# Patient Record
Sex: MALE | Race: WHITE | Employment: PART TIME | URBAN - METROPOLITAN AREA
[De-identification: names, ages, dates, MRNs, and addresses within clinical notes are randomized per-mention and may not be internally consistent; named-entity substitution may affect disease eponyms.]

---

## 2018-05-31 ENCOUNTER — HOSPITAL ENCOUNTER (OUTPATIENT)
Dept: ULTRASOUND IMAGING | Facility: HOSPITAL | Age: 63
Discharge: HOME | End: 2018-05-31
Attending: INTERNAL MEDICINE
Payer: COMMERCIAL

## 2018-05-31 DIAGNOSIS — L97.522 CHRONIC ULCER OF LEFT FOOT WITH FAT LAYER EXPOSED (HCC): ICD-10-CM

## 2018-05-31 DIAGNOSIS — E13.51 PERIPHERAL VASCULAR DISEASE DUE TO SECONDARY DIABETES (HCC): ICD-10-CM

## 2018-05-31 PROCEDURE — 93923 UPR/LXTR ART STDY 3+ LVLS: CPT | Performed by: INTERNAL MEDICINE

## 2018-11-02 ENCOUNTER — OFFICE VISIT (OUTPATIENT)
Dept: INTERNAL MEDICINE CLINIC | Facility: CLINIC | Age: 63
End: 2018-11-02
Payer: COMMERCIAL

## 2018-11-02 VITALS
WEIGHT: 217 LBS | BODY MASS INDEX: 28.76 KG/M2 | DIASTOLIC BLOOD PRESSURE: 72 MMHG | HEIGHT: 73 IN | HEART RATE: 69 BPM | OXYGEN SATURATION: 98 % | SYSTOLIC BLOOD PRESSURE: 120 MMHG | TEMPERATURE: 99 F

## 2018-11-02 DIAGNOSIS — E11.42 UNCONTROLLED TYPE 2 DIABETES MELLITUS WITH PERIPHERAL NEUROPATHY (HCC): ICD-10-CM

## 2018-11-02 DIAGNOSIS — Z00.00 WELLNESS EXAMINATION: Primary | ICD-10-CM

## 2018-11-02 DIAGNOSIS — E11.65 UNCONTROLLED TYPE 2 DIABETES MELLITUS WITH PERIPHERAL NEUROPATHY (HCC): ICD-10-CM

## 2018-11-02 PROCEDURE — 99386 PREV VISIT NEW AGE 40-64: CPT | Performed by: INTERNAL MEDICINE

## 2018-11-02 NOTE — PROGRESS NOTES
HPI:    Patient ID: Anthony Vergara is a 63year old male. Pt here for getting established as a new patient - has uncontrolled DM - most recent A1c was 11 does not take statin due to side effects. Labs were done 3 weeks .  Sees endocrinology for DM m round, and reactive to light. No scleral icterus. Neck: Neck supple. No JVD present. Cardiovascular: Normal rate, regular rhythm and normal heart sounds. Exam reveals no gallop. No murmur heard.   Pulmonary/Chest: Effort normal and breath sounds nor

## 2018-12-26 ENCOUNTER — LAB ENCOUNTER (OUTPATIENT)
Dept: LAB | Facility: HOSPITAL | Age: 63
End: 2018-12-26
Attending: INTERNAL MEDICINE
Payer: COMMERCIAL

## 2018-12-26 DIAGNOSIS — E11.39 DIABETIC OCULOPATHY (HCC): Primary | ICD-10-CM

## 2018-12-26 DIAGNOSIS — Z00.00 WELLNESS EXAMINATION: ICD-10-CM

## 2018-12-26 PROCEDURE — 36415 COLL VENOUS BLD VENIPUNCTURE: CPT

## 2018-12-26 PROCEDURE — 83036 HEMOGLOBIN GLYCOSYLATED A1C: CPT

## 2018-12-26 PROCEDURE — 80053 COMPREHEN METABOLIC PANEL: CPT

## 2019-01-02 ENCOUNTER — OFFICE VISIT (OUTPATIENT)
Dept: INTERNAL MEDICINE CLINIC | Facility: CLINIC | Age: 64
End: 2019-01-02
Payer: COMMERCIAL

## 2019-01-02 VITALS
HEART RATE: 64 BPM | SYSTOLIC BLOOD PRESSURE: 120 MMHG | WEIGHT: 220 LBS | BODY MASS INDEX: 29.16 KG/M2 | OXYGEN SATURATION: 98 % | DIASTOLIC BLOOD PRESSURE: 70 MMHG | HEIGHT: 73 IN

## 2019-01-02 DIAGNOSIS — E11.65 UNCONTROLLED TYPE 2 DIABETES MELLITUS WITH NEUROPATHY CAUSING ERECTILE DYSFUNCTION (HCC): Primary | ICD-10-CM

## 2019-01-02 DIAGNOSIS — Z12.11 COLON CANCER SCREENING: ICD-10-CM

## 2019-01-02 DIAGNOSIS — N52.1 UNCONTROLLED TYPE 2 DIABETES MELLITUS WITH NEUROPATHY CAUSING ERECTILE DYSFUNCTION (HCC): Primary | ICD-10-CM

## 2019-01-02 DIAGNOSIS — E11.40 UNCONTROLLED TYPE 2 DIABETES MELLITUS WITH NEUROPATHY CAUSING ERECTILE DYSFUNCTION (HCC): Primary | ICD-10-CM

## 2019-01-02 PROCEDURE — 99214 OFFICE O/P EST MOD 30 MIN: CPT | Performed by: INTERNAL MEDICINE

## 2019-01-02 NOTE — PROGRESS NOTES
HPI:    Patient ID: Ryan Camara is a 66year old male. Pt here for fu on recent labs for diabetes - had  A1c of 10.7 and fasting sugar of 376 currently - will increase his Levemir from 36 units to 50 units  And 10-14 humalog at meals.   Admits to exhibits no distension. There is no hepatosplenomegaly. Musculoskeletal: He exhibits no tenderness. Neurological: He is alert and oriented to person, place, and time.    mirofilamnet sensation slightly impaired in both feet normal pedal pulses normal na

## 2019-01-07 ENCOUNTER — OFFICE VISIT (OUTPATIENT)
Dept: OPHTHALMOLOGY | Facility: CLINIC | Age: 64
End: 2019-01-07
Payer: COMMERCIAL

## 2019-01-07 DIAGNOSIS — H01.00B BLEPHARITIS OF UPPER AND LOWER EYELIDS OF BOTH EYES, UNSPECIFIED TYPE: ICD-10-CM

## 2019-01-07 DIAGNOSIS — H52.13 MYOPIA OF BOTH EYES WITH ASTIGMATISM AND PRESBYOPIA: ICD-10-CM

## 2019-01-07 DIAGNOSIS — H01.00A BLEPHARITIS OF UPPER AND LOWER EYELIDS OF BOTH EYES, UNSPECIFIED TYPE: ICD-10-CM

## 2019-01-07 DIAGNOSIS — H25.13 AGE-RELATED NUCLEAR CATARACT OF BOTH EYES: ICD-10-CM

## 2019-01-07 DIAGNOSIS — E11.3299 BDR (BACKGROUND DIABETIC RETINOPATHY) (HCC): ICD-10-CM

## 2019-01-07 DIAGNOSIS — H52.4 MYOPIA OF BOTH EYES WITH ASTIGMATISM AND PRESBYOPIA: ICD-10-CM

## 2019-01-07 DIAGNOSIS — H52.203 MYOPIA OF BOTH EYES WITH ASTIGMATISM AND PRESBYOPIA: ICD-10-CM

## 2019-01-07 DIAGNOSIS — H50.05 ALTERNATING ESOTROPIA: Primary | ICD-10-CM

## 2019-01-07 DIAGNOSIS — H40.003 GLAUCOMA SUSPECT OF BOTH EYES: ICD-10-CM

## 2019-01-07 PROCEDURE — 92015 DETERMINE REFRACTIVE STATE: CPT | Performed by: OPHTHALMOLOGY

## 2019-01-07 PROCEDURE — 92004 COMPRE OPH EXAM NEW PT 1/>: CPT | Performed by: OPHTHALMOLOGY

## 2019-01-07 NOTE — ASSESSMENT & PLAN NOTE
Continue to follow up with Dr. Endy Bennett as directed. Patient declines dilated exam today due to he has to drive his son to school today. Advised keeping blood sugar under control.

## 2019-01-07 NOTE — PROGRESS NOTES
Nataly Ross is a 66year old male. HPI:     HPI     EP/ 66 yr old here for a complete exam. LDE was in 2009 with Dr. Samira Bocanegra.  Pt states that he has been seeing Dr. Kit Beraux since 2009 and recently has been seeing him every 2 months for injections O Disp:  Rfl:    Accu-Chek Soft Touch Lancets Does not apply Misc  Disp:  Rfl:    LEVEMIR FLEXPEN 100 UNIT/ML Subcutaneous Solution Pen-injector  Disp:  Rfl:    Glucose Blood (ACCU-CHEK ADVANTAGE TEST) In Vitro Strip  Disp:  Rfl:    Insulin Pen Needle (EXEL Sphere Cylinder [place] Dist [place] Add [place]    Right -6.50 +1.00 075 20/30 +2.75 20/20    Left -6.00 +1.00 075 20/30 +2.75 20/20          Manifest Refraction #2       Sphere Cylinder [place] Dist [place] Add [place]    Right -6.50 +1.50 065 20/30- +2.50 20/30-

## 2019-01-07 NOTE — ASSESSMENT & PLAN NOTE
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    Will have patient back in 1 month for diabet

## 2019-01-07 NOTE — PATIENT INSTRUCTIONS
Blepharitis of upper and lower eyelids of both eyes  Patient instructed to use lid hygiene twice daily. Apply baby shampoo to warm washcloth and scrub eyelids gently with eyes closed, then rinse thoroughly.         Alternating esotropia  Longstanding; no t

## 2019-02-04 ENCOUNTER — OFFICE VISIT (OUTPATIENT)
Dept: OPHTHALMOLOGY | Facility: CLINIC | Age: 64
End: 2019-02-04
Payer: COMMERCIAL

## 2019-02-04 DIAGNOSIS — H52.203 MYOPIA OF BOTH EYES WITH ASTIGMATISM AND PRESBYOPIA: ICD-10-CM

## 2019-02-04 DIAGNOSIS — H01.00B BLEPHARITIS OF UPPER AND LOWER EYELIDS OF BOTH EYES, UNSPECIFIED TYPE: ICD-10-CM

## 2019-02-04 DIAGNOSIS — E11.3299 BDR (BACKGROUND DIABETIC RETINOPATHY) (HCC): ICD-10-CM

## 2019-02-04 DIAGNOSIS — H52.13 MYOPIA OF BOTH EYES WITH ASTIGMATISM AND PRESBYOPIA: ICD-10-CM

## 2019-02-04 DIAGNOSIS — H01.00A BLEPHARITIS OF UPPER AND LOWER EYELIDS OF BOTH EYES, UNSPECIFIED TYPE: ICD-10-CM

## 2019-02-04 DIAGNOSIS — H40.003 GLAUCOMA SUSPECT OF BOTH EYES: ICD-10-CM

## 2019-02-04 DIAGNOSIS — H40.1121 PRIMARY OPEN ANGLE GLAUCOMA (POAG) OF LEFT EYE, MILD STAGE: Primary | ICD-10-CM

## 2019-02-04 DIAGNOSIS — H50.05 ALTERNATING ESOTROPIA: ICD-10-CM

## 2019-02-04 DIAGNOSIS — H25.13 AGE-RELATED NUCLEAR CATARACT OF BOTH EYES: ICD-10-CM

## 2019-02-04 DIAGNOSIS — H52.4 MYOPIA OF BOTH EYES WITH ASTIGMATISM AND PRESBYOPIA: ICD-10-CM

## 2019-02-04 PROCEDURE — 92014 COMPRE OPH EXAM EST PT 1/>: CPT | Performed by: OPHTHALMOLOGY

## 2019-02-04 PROCEDURE — 92133 CPTRZD OPH DX IMG PST SGM ON: CPT | Performed by: OPHTHALMOLOGY

## 2019-02-04 PROCEDURE — 76514 ECHO EXAM OF EYE THICKNESS: CPT | Performed by: OPHTHALMOLOGY

## 2019-02-04 RX ORDER — LATANOPROST 50 UG/ML
1 SOLUTION/ DROPS OPHTHALMIC NIGHTLY
Qty: 3 BOTTLE | Refills: 3 | Status: SHIPPED | OUTPATIENT
Start: 2019-02-04 | End: 2020-02-07

## 2019-02-04 NOTE — ASSESSMENT & PLAN NOTE
Discussed diagnosis of glaucoma in the left eye with patient. START Latanoprost in the left eye only at bedtime. Will have patient back in 2 months for a visual field and repeat IOP.     Discussed potential side effects of Latanoprost drops such as colo

## 2019-02-04 NOTE — PATIENT INSTRUCTIONS
Primary open angle glaucoma (POAG) of left eye, mild stage  Discussed diagnosis of glaucoma in the left eye with patient. START Latanoprost in the left eye only at bedtime. Will have patient back in 2 months for a visual field and repeat IOP.     Discus They should continue to be monitored every trimester and for 1 year after delivery, depending on the severity of the retinopathy.   Your medical history  Your eye healthcare provider may ask about the following:  · Your diabetes type, history, treatments (s photographs. You may feel brief nausea during the procedure. For a few hours after the test, your skin, eyes, and urine may appear yellow.  Talk with your healthcare provider for more information about this test.   Date Last Reviewed: 6/1/2016 © 2000-2018 (at least every 1 to 2 years).     To learn more  The resources below can help you learn more:  · American Diabetes Association   [phone]  www. diabetes. org  · Chip Mcdonough  164.588.8636  www. lighthouse. org  · [address]  [address]- and how often you need these exams. You can also help control your diabetes through exercise, diet, and medicine, as instructed by your healthcare provider. These same steps may also help control diabetic retinopathy.   Date Last Reviewed: 6/1/2016 © 2000- retina (the inside lining of the back of the eye). · Grid treatment treats swelling in different areas of the macula (a spot in the middle of the retina responsible for clear vision). · Focal treatment seals up small areas of leakage in the retina.   Prep include the following:   · Eye irritation  · Bleeding in the retina  · Retinal detachment  · Corneal abrasion  · Watery eyes  · Dilated pupils  · Mild headache  · Double or blurry vision  · Decreased vision  · Seeing spots  · Problems with glare  · Loss of suddenly becomes completely blocked. Eye pressure builds rapidly. You may notice blurred vision and rainbow halos around lights. You may also have headaches, nausea, vomiting, and severe pain. If not treated right away, blindness can happen quickly.   Date

## 2019-02-05 NOTE — PROGRESS NOTES
Radha Han is a 66year old male. HPI:     HPI     EP/ 67 yo M here for DM EE, OCT and pachymetry. He was never dilated here before. Patient has Type 2 DM for 12 years; Last A1C was 10.7 in December of 2018.   Patient got new glasses made from [facility]      Medications:    Current Outpatient Medications:  latanoprost 0.005 % Ophthalmic Solution Place 1 drop into the left eye nightly.  Instill 1 drop by ophthalmic route every night into left eye Disp: 3 Bottle Rfl: 3   Neomycin-Polymyxin-HC (CORTISPORIN Cylinder Axis    Right -7.00 +2.00 062    Left -6.00 +0.00 000    Type:  Distance only          Wearing Rx #2       Sphere Cylinder Axis    Right -5.50 +1.00 083    Left -6.00 +1.25 079    Type:  Reading only                 ASSESSMENT/PLAN:     Diagnoses instructions:  Return in about 2 months (around 4/4/2019) for Visual field, IOP check. 2/4/2019  Scribed by: Jo Ann Lyman.  Paty Robison MD

## 2019-04-01 ENCOUNTER — OFFICE VISIT (OUTPATIENT)
Dept: OPHTHALMOLOGY | Facility: CLINIC | Age: 64
End: 2019-04-01
Payer: COMMERCIAL

## 2019-04-01 DIAGNOSIS — H52.13 MYOPIA OF BOTH EYES WITH ASTIGMATISM AND PRESBYOPIA: ICD-10-CM

## 2019-04-01 DIAGNOSIS — E11.3299 BDR (BACKGROUND DIABETIC RETINOPATHY) (HCC): ICD-10-CM

## 2019-04-01 DIAGNOSIS — H01.00A BLEPHARITIS OF UPPER AND LOWER EYELIDS OF BOTH EYES, UNSPECIFIED TYPE: ICD-10-CM

## 2019-04-01 DIAGNOSIS — H52.203 MYOPIA OF BOTH EYES WITH ASTIGMATISM AND PRESBYOPIA: ICD-10-CM

## 2019-04-01 DIAGNOSIS — H40.1121 PRIMARY OPEN ANGLE GLAUCOMA (POAG) OF LEFT EYE, MILD STAGE: Primary | ICD-10-CM

## 2019-04-01 DIAGNOSIS — H52.4 MYOPIA OF BOTH EYES WITH ASTIGMATISM AND PRESBYOPIA: ICD-10-CM

## 2019-04-01 DIAGNOSIS — H01.00B BLEPHARITIS OF UPPER AND LOWER EYELIDS OF BOTH EYES, UNSPECIFIED TYPE: ICD-10-CM

## 2019-04-01 DIAGNOSIS — H50.05 ALTERNATING ESOTROPIA: ICD-10-CM

## 2019-04-01 DIAGNOSIS — H25.13 AGE-RELATED NUCLEAR CATARACT OF BOTH EYES: ICD-10-CM

## 2019-04-01 PROBLEM — E11.311 DIABETIC MACULAR EDEMA OF LEFT EYE: Status: ACTIVE | Noted: 2018-01-01

## 2019-04-01 PROBLEM — E11.311 DIABETIC MACULAR EDEMA OF LEFT EYE (HCC): Status: ACTIVE | Noted: 2018-01-01

## 2019-04-01 PROBLEM — H35.81 DIABETIC MACULAR EDEMA OF LEFT EYE: Status: ACTIVE | Noted: 2018-01-01

## 2019-04-01 PROBLEM — H35.81: Status: ACTIVE | Noted: 2018-01-01

## 2019-04-01 PROBLEM — H35.81 DIABETIC MACULAR EDEMA OF LEFT EYE (HCC): Status: ACTIVE | Noted: 2018-01-01

## 2019-04-01 PROBLEM — E11.311: Status: ACTIVE | Noted: 2018-01-01

## 2019-04-01 PROCEDURE — 92012 INTRM OPH EXAM EST PATIENT: CPT | Performed by: OPHTHALMOLOGY

## 2019-04-01 PROCEDURE — 92083 EXTENDED VISUAL FIELD XM: CPT | Performed by: OPHTHALMOLOGY

## 2019-04-01 NOTE — PATIENT INSTRUCTIONS
Blepharitis of upper and lower eyelids of both eyes  Patient instructed to use lid hygiene twice daily. Apply baby shampoo to warm washcloth and scrub eyelids gently with eyes closed, then rinse thoroughly.         Primary open angle glaucoma (POAG) of lef

## 2019-04-01 NOTE — PROGRESS NOTES
Natasha Garcia is a 66year old male. HPI:     HPI     EP/ 66year old here for a VF and IOP check. LDE was 2/04/19 with a hx of POAG OS Dr. Rafaela King  started Latanoprost OS QHS on 2/04/19.   He also has a hx of Glaucoma suspect OD, myopia with astigm latanoprost 0.005 % Ophthalmic Solution Place 1 drop into the left eye nightly.  Instill 1 drop by ophthalmic route every night into left eye Disp: 3 Bottle Rfl: 3   Insulin Lispro, Human, (HUMALOG) 100 UNIT/ML Subcutaneous Solution Inject  into the skin 3 Vessels few dot heme, no vladislav few dot heme, no vladislav    Periphery Normal Normal            Refraction     Wearing Rx       Sphere Cylinder Axis    Right -7.00 +2.00 062    Left -6.00 +0.00 000    Type:  Distance only          Wearing Rx #2       Sphere Cylin

## 2019-04-01 NOTE — ASSESSMENT & PLAN NOTE
Follow with Dr. Trent Albert. Last seen 3/18/19, no injection that visit for macular edema. Diabetic macular edema OU resolved following treatment in both eyes which involved focal laser and Ey injections.

## 2019-04-01 NOTE — ASSESSMENT & PLAN NOTE
IOP good 13/13 adjusted 11/9. Continue Latanoprost in  Left eye at night. No glaucoma in right eye. Visual field shows changes from focal laser- constricted field. Recheck OCT next time.

## 2019-04-03 ENCOUNTER — OFFICE VISIT (OUTPATIENT)
Dept: INTERNAL MEDICINE CLINIC | Facility: CLINIC | Age: 64
End: 2019-04-03
Payer: COMMERCIAL

## 2019-04-03 VITALS
SYSTOLIC BLOOD PRESSURE: 144 MMHG | HEART RATE: 67 BPM | HEIGHT: 73 IN | WEIGHT: 221 LBS | DIASTOLIC BLOOD PRESSURE: 72 MMHG | OXYGEN SATURATION: 99 % | BODY MASS INDEX: 29.29 KG/M2

## 2019-04-03 DIAGNOSIS — Z12.11 COLON CANCER SCREENING: ICD-10-CM

## 2019-04-03 DIAGNOSIS — E78.2 MIXED HYPERLIPIDEMIA: ICD-10-CM

## 2019-04-03 DIAGNOSIS — E11.65 TYPE 2 DIABETES MELLITUS, UNCONTROLLED, WITH NEUROPATHY (HCC): Primary | ICD-10-CM

## 2019-04-03 DIAGNOSIS — E11.40 TYPE 2 DIABETES MELLITUS, UNCONTROLLED, WITH NEUROPATHY (HCC): Primary | ICD-10-CM

## 2019-04-03 PROCEDURE — 83036 HEMOGLOBIN GLYCOSYLATED A1C: CPT | Performed by: INTERNAL MEDICINE

## 2019-04-03 PROCEDURE — 99214 OFFICE O/P EST MOD 30 MIN: CPT | Performed by: INTERNAL MEDICINE

## 2019-04-03 PROCEDURE — 80053 COMPREHEN METABOLIC PANEL: CPT | Performed by: INTERNAL MEDICINE

## 2019-04-03 PROCEDURE — 85025 COMPLETE CBC W/AUTO DIFF WBC: CPT | Performed by: INTERNAL MEDICINE

## 2019-04-03 PROCEDURE — 80061 LIPID PANEL: CPT | Performed by: INTERNAL MEDICINE

## 2019-04-03 NOTE — PROGRESS NOTES
Pt presented to clinic today for blood draw. Per physician able to draw orders. Orders  documented within chart. Pt tolerated lab draw well.  verified.   Orders drawn include: A1c, CBC, CMP, Lipid  Site of draw: left arm

## 2019-04-03 NOTE — PROGRESS NOTES
HPI:    Patient ID: Florin Solis is a 66year old male. Pt here for fu on Dm hyperlipidemia and diabetic retininopathy. Has had better control of glycemia recently. Has been a bit constipated. Is overdue for colon cancer screening.   Average ris Soft. He exhibits no distension. There is no tenderness. Musculoskeletal: He exhibits no tenderness. Neurological: He is alert and oriented to person, place, and time.    Diminished microfilament sensation in the soles of both feet with some diminished

## 2019-07-01 ENCOUNTER — OFFICE VISIT (OUTPATIENT)
Dept: OPHTHALMOLOGY | Facility: CLINIC | Age: 64
End: 2019-07-01
Payer: COMMERCIAL

## 2019-07-01 DIAGNOSIS — H52.203 MYOPIA OF BOTH EYES WITH ASTIGMATISM AND PRESBYOPIA: ICD-10-CM

## 2019-07-01 DIAGNOSIS — H35.81: ICD-10-CM

## 2019-07-01 DIAGNOSIS — E11.311: ICD-10-CM

## 2019-07-01 DIAGNOSIS — H52.4 MYOPIA OF BOTH EYES WITH ASTIGMATISM AND PRESBYOPIA: ICD-10-CM

## 2019-07-01 DIAGNOSIS — H52.13 MYOPIA OF BOTH EYES WITH ASTIGMATISM AND PRESBYOPIA: ICD-10-CM

## 2019-07-01 DIAGNOSIS — H40.1121 PRIMARY OPEN ANGLE GLAUCOMA (POAG) OF LEFT EYE, MILD STAGE: Primary | ICD-10-CM

## 2019-07-01 DIAGNOSIS — H01.00A BLEPHARITIS OF UPPER AND LOWER EYELIDS OF BOTH EYES, UNSPECIFIED TYPE: ICD-10-CM

## 2019-07-01 DIAGNOSIS — H01.00B BLEPHARITIS OF UPPER AND LOWER EYELIDS OF BOTH EYES, UNSPECIFIED TYPE: ICD-10-CM

## 2019-07-01 PROCEDURE — 92133 CPTRZD OPH DX IMG PST SGM ON: CPT | Performed by: OPHTHALMOLOGY

## 2019-07-01 PROCEDURE — 92012 INTRM OPH EXAM EST PATIENT: CPT | Performed by: OPHTHALMOLOGY

## 2019-07-01 NOTE — PROGRESS NOTES
Catherine Cisneros is a 66year old male. HPI:     HPI     EP/ 67 yo M here for OCT and IOP. LDE: 2/4/19   Last OCT: 2/4/19  Last VF: 4/1/19   Patient has COAG OS and is on Latanoprost qhs OS.   He has BDR OU, cataract OU, alternating ET, and blephariti No      Medications:    Current Outpatient Medications:  latanoprost 0.005 % Ophthalmic Solution Place 1 drop into the left eye nightly.  Instill 1 drop by ophthalmic route every night into left eye Disp: 3 Bottle Rfl: 3   Insulin Lispro, Human, (HUMALOG) 1 check.    7/1/2019  Scribed by: Jose Berry MD

## 2019-07-01 NOTE — PATIENT INSTRUCTIONS
Primary open angle glaucoma (POAG) of left eye, mild stage  IOP 15/15 adjusted 13/11  Continue Latanoprost at night left eye    Myopia of both eyes with astigmatism and presbyopia  Same glasses    Blepharitis of upper and lower eyelids of both eyes  Patien

## 2019-11-01 ENCOUNTER — OFFICE VISIT (OUTPATIENT)
Dept: OPHTHALMOLOGY | Facility: CLINIC | Age: 64
End: 2019-11-01
Payer: COMMERCIAL

## 2019-11-01 DIAGNOSIS — H40.1121 PRIMARY OPEN ANGLE GLAUCOMA (POAG) OF LEFT EYE, MILD STAGE: Primary | ICD-10-CM

## 2019-11-01 DIAGNOSIS — H52.13 MYOPIA OF BOTH EYES WITH ASTIGMATISM AND PRESBYOPIA: ICD-10-CM

## 2019-11-01 DIAGNOSIS — H50.05 ALTERNATING ESOTROPIA: ICD-10-CM

## 2019-11-01 DIAGNOSIS — H52.203 MYOPIA OF BOTH EYES WITH ASTIGMATISM AND PRESBYOPIA: ICD-10-CM

## 2019-11-01 DIAGNOSIS — E11.3299 BDR (BACKGROUND DIABETIC RETINOPATHY) (HCC): ICD-10-CM

## 2019-11-01 DIAGNOSIS — H52.4 MYOPIA OF BOTH EYES WITH ASTIGMATISM AND PRESBYOPIA: ICD-10-CM

## 2019-11-01 DIAGNOSIS — H01.00A BLEPHARITIS OF UPPER AND LOWER EYELIDS OF BOTH EYES, UNSPECIFIED TYPE: ICD-10-CM

## 2019-11-01 DIAGNOSIS — H25.13 AGE-RELATED NUCLEAR CATARACT OF BOTH EYES: ICD-10-CM

## 2019-11-01 DIAGNOSIS — H01.00B BLEPHARITIS OF UPPER AND LOWER EYELIDS OF BOTH EYES, UNSPECIFIED TYPE: ICD-10-CM

## 2019-11-01 PROCEDURE — 92015 DETERMINE REFRACTIVE STATE: CPT | Performed by: OPHTHALMOLOGY

## 2019-11-01 PROCEDURE — 92012 INTRM OPH EXAM EST PATIENT: CPT | Performed by: OPHTHALMOLOGY

## 2019-11-01 NOTE — PROGRESS NOTES
Alcon Ortega is a 66year old male. HPI:     HPI     EP/66year old here for an IOP check.   LDE was 2/04/19 he was last seen on 7/01/19 with a hx of COAG OS and is on Latanoprost qhs OS.  He has BDR OU, cataract OU, alternating ET, and blepharitis Place 1 drop into the left eye nightly. Instill 1 drop by ophthalmic route every night into left eye, Disp: 3 Bottle, Rfl: 3  Insulin Lispro, Human, (HUMALOG) 100 UNIT/ML Subcutaneous Solution, Inject  into the skin 3 (three) times daily before meals. , Dis Left -6.00 +0.00 000    Type:  Distance only          Manifest Refraction       Sphere Cylinder [place] Dist [place] Add [place]    Right -6.75 +1.50 060 20/30- +2.50 20/20-    Left -5.75 Sphere  20/30- +2.50 20/20-          Final Rx       Sphere Cylinder Axis Dis

## 2019-11-06 ENCOUNTER — OFFICE VISIT (OUTPATIENT)
Dept: INTERNAL MEDICINE CLINIC | Facility: CLINIC | Age: 64
End: 2019-11-06
Payer: COMMERCIAL

## 2019-11-06 VITALS
BODY MASS INDEX: 29.29 KG/M2 | OXYGEN SATURATION: 98 % | WEIGHT: 221 LBS | HEART RATE: 74 BPM | SYSTOLIC BLOOD PRESSURE: 124 MMHG | HEIGHT: 73 IN | DIASTOLIC BLOOD PRESSURE: 82 MMHG

## 2019-11-06 DIAGNOSIS — Z79.4 TYPE 2 DIABETES MELLITUS TREATED WITH INSULIN (HCC): ICD-10-CM

## 2019-11-06 DIAGNOSIS — E11.9 TYPE 2 DIABETES MELLITUS TREATED WITH INSULIN (HCC): ICD-10-CM

## 2019-11-06 DIAGNOSIS — E78.2 MIXED HYPERLIPIDEMIA: ICD-10-CM

## 2019-11-06 DIAGNOSIS — Z00.00 WELLNESS EXAMINATION: Primary | ICD-10-CM

## 2019-11-06 DIAGNOSIS — Z12.11 COLON CANCER SCREENING: ICD-10-CM

## 2019-11-06 PROCEDURE — 99396 PREV VISIT EST AGE 40-64: CPT | Performed by: INTERNAL MEDICINE

## 2019-11-06 PROCEDURE — 80053 COMPREHEN METABOLIC PANEL: CPT | Performed by: INTERNAL MEDICINE

## 2019-11-06 PROCEDURE — 80061 LIPID PANEL: CPT | Performed by: INTERNAL MEDICINE

## 2019-11-06 PROCEDURE — 84153 ASSAY OF PSA TOTAL: CPT | Performed by: INTERNAL MEDICINE

## 2019-11-06 PROCEDURE — 83036 HEMOGLOBIN GLYCOSYLATED A1C: CPT | Performed by: INTERNAL MEDICINE

## 2019-11-06 RX ORDER — INSULIN DEGLUDEC 200 U/ML
INJECTION, SOLUTION SUBCUTANEOUS
COMMUNITY
Start: 2019-07-11

## 2019-11-06 RX ORDER — INSULIN ASPART 100 [IU]/ML
INJECTION, SOLUTION INTRAVENOUS; SUBCUTANEOUS
COMMUNITY
Start: 2019-09-09

## 2019-11-06 RX ORDER — NAFTIFINE HCL 1 %
GEL (GRAM) TOPICAL
Refills: 1 | COMMUNITY
Start: 2019-09-16

## 2019-11-06 NOTE — PROGRESS NOTES
HPI:    Patient ID: Peter Rankin is a 66year old male. HPI Pt here for a general check up and fu on Dm type 2 and hyperlipidemia and slight glaucoma in the left eye. Is complaining of post nasal drip. Has had some constipation    Review of System trophic skin or nail changes pedal pulses intact   Skin: No rash noted. Psychiatric: He has a normal mood and affect.               ASSESSMENT/PLAN:   Wellness examination  (primary encounter diagnosis)  Type 2 diabetes mellitus treated with insulin (hcc)

## 2020-02-07 ENCOUNTER — OFFICE VISIT (OUTPATIENT)
Dept: OPHTHALMOLOGY | Facility: CLINIC | Age: 65
End: 2020-02-07
Payer: COMMERCIAL

## 2020-02-07 DIAGNOSIS — H50.05 ALTERNATING ESOTROPIA: ICD-10-CM

## 2020-02-07 DIAGNOSIS — H25.13 AGE-RELATED NUCLEAR CATARACT OF BOTH EYES: ICD-10-CM

## 2020-02-07 DIAGNOSIS — H52.13 MYOPIA OF BOTH EYES WITH ASTIGMATISM AND PRESBYOPIA: ICD-10-CM

## 2020-02-07 DIAGNOSIS — E11.311 DIABETIC MACULAR EDEMA OF BOTH EYES (HCC): ICD-10-CM

## 2020-02-07 DIAGNOSIS — E11.3299 BDR (BACKGROUND DIABETIC RETINOPATHY) (HCC): ICD-10-CM

## 2020-02-07 DIAGNOSIS — H40.1130 PRIMARY OPEN ANGLE GLAUCOMA OF BOTH EYES, UNSPECIFIED GLAUCOMA STAGE: Primary | ICD-10-CM

## 2020-02-07 DIAGNOSIS — H52.203 MYOPIA OF BOTH EYES WITH ASTIGMATISM AND PRESBYOPIA: ICD-10-CM

## 2020-02-07 DIAGNOSIS — H52.4 MYOPIA OF BOTH EYES WITH ASTIGMATISM AND PRESBYOPIA: ICD-10-CM

## 2020-02-07 PROBLEM — H40.10X0 OPEN-ANGLE GLAUCOMA OF LEFT EYE: Status: ACTIVE | Noted: 2019-02-04

## 2020-02-07 PROBLEM — H40.1131 PRIMARY OPEN ANGLE GLAUCOMA (POAG) OF BOTH EYES, MILD STAGE: Status: ACTIVE | Noted: 2019-02-04

## 2020-02-07 PROCEDURE — 92133 CPTRZD OPH DX IMG PST SGM ON: CPT | Performed by: OPHTHALMOLOGY

## 2020-02-07 PROCEDURE — 92015 DETERMINE REFRACTIVE STATE: CPT | Performed by: OPHTHALMOLOGY

## 2020-02-07 PROCEDURE — 92014 COMPRE OPH EXAM EST PT 1/>: CPT | Performed by: OPHTHALMOLOGY

## 2020-02-07 RX ORDER — LATANOPROST 50 UG/ML
1 SOLUTION/ DROPS OPHTHALMIC NIGHTLY
Qty: 3 BOTTLE | Refills: 3 | Status: SHIPPED | OUTPATIENT
Start: 2020-02-07 | End: 2020-02-12

## 2020-02-07 NOTE — PROGRESS NOTES
Fernando Hendrickson is a 60year old male. HPI:     HPI     EP/ 60 yr old here for an EE and OCT. LDE 2/4/19; last seen on 11/1/19 with Hx of BDR with macular edema; pt has been following up with Dr. Sangeetha Hinton as directed ( last seen on 12/13/19).  Pt also eyes nightly.  Instill 1 drop by ophthalmic route every night into left eye 3 Bottle 3   • NOVOLOG FLEXPEN 100 UNIT/ML Subcutaneous Solution Pen-injector      • TRESIBA FLEXTOUCH 200 UNIT/ML Subcutaneous Solution Pen-injector      • NAFTIN 1 % External Gel Sphere Cylinder [place] Dist [place] Add [place]    Right -7.25 +1.50 060 20/30- +3.00 20/20    Left -5.50 Sphere  20/30 +3.00 20/20          Final Rx       Sphere Cylinder [place] Dist VA Near [place]    Right -7.25 +1.50 060 20/30-     Left -5.50 Sphere  20/30     Type:

## 2020-02-07 NOTE — PATIENT INSTRUCTIONS
Myopia of both eyes with astigmatism and presbyopia  New glasses. Separate distance and reading.     Diabetic macular edema of both eyes ([address]  Under the care of Dr. Ariella Cunha    Primary open angle glaucoma (POAG) of both eyes  Latanoprost drops to Both eyes

## 2020-02-12 ENCOUNTER — TELEPHONE (OUTPATIENT)
Dept: OPHTHALMOLOGY | Facility: CLINIC | Age: 65
End: 2020-02-12

## 2020-02-12 RX ORDER — LATANOPROST 50 UG/ML
SOLUTION/ DROPS OPHTHALMIC
Qty: 3 BOTTLE | Refills: 3 | Status: SHIPPED | OUTPATIENT
Start: 2020-02-12 | End: 2020-10-09

## 2020-02-12 NOTE — TELEPHONE ENCOUNTER
Spoke with Zaida Zambrano and [address] [place] [address]. I told her the instructions were supposed to be use 1 drop in both eyes at bedtime.

## 2020-02-12 NOTE — TELEPHONE ENCOUNTER
Geovani Molina from [email or web address] mail order is calling needs to know 1 drop into left or 1 drop into both eye please explain please advise

## 2020-06-08 ENCOUNTER — OFFICE VISIT (OUTPATIENT)
Dept: OPHTHALMOLOGY | Facility: CLINIC | Age: 65
End: 2020-06-08
Payer: COMMERCIAL

## 2020-06-08 DIAGNOSIS — H52.4 MYOPIA OF BOTH EYES WITH ASTIGMATISM AND PRESBYOPIA: ICD-10-CM

## 2020-06-08 DIAGNOSIS — E11.311 DIABETIC MACULAR EDEMA OF BOTH EYES (HCC): ICD-10-CM

## 2020-06-08 DIAGNOSIS — H25.13 AGE-RELATED NUCLEAR CATARACT OF BOTH EYES: ICD-10-CM

## 2020-06-08 DIAGNOSIS — H52.13 MYOPIA OF BOTH EYES WITH ASTIGMATISM AND PRESBYOPIA: ICD-10-CM

## 2020-06-08 DIAGNOSIS — H50.05 ALTERNATING ESOTROPIA: ICD-10-CM

## 2020-06-08 DIAGNOSIS — H40.1130 PRIMARY OPEN ANGLE GLAUCOMA OF BOTH EYES, UNSPECIFIED GLAUCOMA STAGE: Primary | ICD-10-CM

## 2020-06-08 DIAGNOSIS — H52.203 MYOPIA OF BOTH EYES WITH ASTIGMATISM AND PRESBYOPIA: ICD-10-CM

## 2020-06-08 PROCEDURE — 92012 INTRM OPH EXAM EST PATIENT: CPT | Performed by: OPHTHALMOLOGY

## 2020-06-08 NOTE — PROGRESS NOTES
Edilia Decker is a 60year old male. HPI:     HPI     Pt is here for an IOP check.  LDE 2/7/2020 with history of POAG OU (taking Latanoprost OU QHS), myopia with astigmatism and presbyopia OU, alternating esotropia, cataracts OU and diabetic macular NOVOLOG FLEXPEN 100 UNIT/ML Subcutaneous Solution Pen-injector      • TRESIBA FLEXTOUCH 200 UNIT/ML Subcutaneous Solution Pen-injector      • NAFTIN 1 % External Gel SOC[place]RO AA BID  1   • Accu-Chek Soft Touch Lancets Does not apply Misc      • Glucose Blood (A 2/7/2020. Will repeat next visit. Continue Latanoprost at night both eyes. Myopia of both eyes with astigmatism and presbyopia  Same glasses. Diabetic macular edema of both eyes ([address]  Follow up with Dr. Manny Hammond.     Age-related nuclear cataract of

## 2020-06-08 NOTE — ASSESSMENT & PLAN NOTE
Latanoprost drops to Both eyes every night. IOP 1615 adjusted 14/11 today  OCT stable last time 2/7/2020. Will repeat next visit. Continue Latanoprost at night both eyes.

## 2020-06-08 NOTE — PATIENT INSTRUCTIONS
Primary open angle glaucoma (POAG) of both eyes  Latanoprost drops to Both eyes every night. IOP 1615 adjusted 14/11 today  OCT stable last time 2/7/2020. Will repeat next visit. Continue Latanoprost at night both eyes.     Myopia of both eyes with astigma

## 2020-10-09 ENCOUNTER — OFFICE VISIT (OUTPATIENT)
Dept: OPHTHALMOLOGY | Facility: CLINIC | Age: 65
End: 2020-10-09
Payer: COMMERCIAL

## 2020-10-09 DIAGNOSIS — E11.3299 BDR (BACKGROUND DIABETIC RETINOPATHY) (HCC): ICD-10-CM

## 2020-10-09 DIAGNOSIS — H01.00B BLEPHARITIS OF UPPER AND LOWER EYELIDS OF BOTH EYES, UNSPECIFIED TYPE: ICD-10-CM

## 2020-10-09 DIAGNOSIS — H50.05 ALTERNATING ESOTROPIA: ICD-10-CM

## 2020-10-09 DIAGNOSIS — H40.1131 PRIMARY OPEN ANGLE GLAUCOMA (POAG) OF BOTH EYES, MILD STAGE: Primary | ICD-10-CM

## 2020-10-09 DIAGNOSIS — H01.00A BLEPHARITIS OF UPPER AND LOWER EYELIDS OF BOTH EYES, UNSPECIFIED TYPE: ICD-10-CM

## 2020-10-09 PROCEDURE — 92133 CPTRZD OPH DX IMG PST SGM ON: CPT | Performed by: OPHTHALMOLOGY

## 2020-10-09 PROCEDURE — 92012 INTRM OPH EXAM EST PATIENT: CPT | Performed by: OPHTHALMOLOGY

## 2020-10-09 RX ORDER — LATANOPROST 50 UG/ML
SOLUTION/ DROPS OPHTHALMIC
Qty: 3 BOTTLE | Refills: 3 | Status: SHIPPED | OUTPATIENT
Start: 2020-10-09 | End: 2021-06-18

## 2020-10-09 NOTE — PROGRESS NOTES
Anthony Vergara is a 60year old male. HPI:     HPI     EP/60year old here for an IOP check and OCT. LDE was 2/07/20 he was last seen on 6/08/20 with a history of POAG OU he is taking Latanoprost OU QHS as directed.   He also has a history of myopia FLEXPEN 100 UNIT/ML Subcutaneous Solution Pen-injector      • TRESIBA FLEXTOUCH 200 UNIT/ML Subcutaneous Solution Pen-injector      • NAFTIN 1 % External Gel [place] AA BID  1   • Accu-Chek Soft Touch Lancets Does not apply Misc      • Glucose Blood (ACCU-CHEK daily. Apply baby shampoo to warm washcloth and scrub eyelids gently with eyes closed, then rinse thoroughly. Alternating esotropia  Longstanding, stable. Not interested in eye muscle surgery.     BDR (background diabetic retinopathy) ([facility] [address]  Follow

## 2020-10-09 NOTE — PATIENT INSTRUCTIONS
Primary open angle glaucoma (POAG) of both eyes  IOP 16/17 adj 14/13   OCT stable both eyes. Continue Latanoprost both eyes at night    Blepharitis of upper and lower eyelids of both eyes  Patient instructed to use lid hygiene daily.   Apply baby shampoo t

## 2021-02-15 ENCOUNTER — OFFICE VISIT (OUTPATIENT)
Dept: OPHTHALMOLOGY | Facility: CLINIC | Age: 66
End: 2021-02-15
Payer: COMMERCIAL

## 2021-02-15 DIAGNOSIS — H25.13 AGE-RELATED NUCLEAR CATARACT OF BOTH EYES: ICD-10-CM

## 2021-02-15 DIAGNOSIS — E11.3299 BDR (BACKGROUND DIABETIC RETINOPATHY) (HCC): ICD-10-CM

## 2021-02-15 DIAGNOSIS — H52.203 MYOPIA OF BOTH EYES WITH ASTIGMATISM AND PRESBYOPIA: ICD-10-CM

## 2021-02-15 DIAGNOSIS — H52.13 MYOPIA OF BOTH EYES WITH ASTIGMATISM AND PRESBYOPIA: ICD-10-CM

## 2021-02-15 DIAGNOSIS — H01.00B BLEPHARITIS OF UPPER AND LOWER EYELIDS OF BOTH EYES, UNSPECIFIED TYPE: ICD-10-CM

## 2021-02-15 DIAGNOSIS — H01.00A BLEPHARITIS OF UPPER AND LOWER EYELIDS OF BOTH EYES, UNSPECIFIED TYPE: ICD-10-CM

## 2021-02-15 DIAGNOSIS — H40.1131 PRIMARY OPEN ANGLE GLAUCOMA (POAG) OF BOTH EYES, MILD STAGE: Primary | ICD-10-CM

## 2021-02-15 DIAGNOSIS — H52.4 MYOPIA OF BOTH EYES WITH ASTIGMATISM AND PRESBYOPIA: ICD-10-CM

## 2021-02-15 PROCEDURE — 92015 DETERMINE REFRACTIVE STATE: CPT | Performed by: OPHTHALMOLOGY

## 2021-02-15 PROCEDURE — 92014 COMPRE OPH EXAM EST PT 1/>: CPT | Performed by: OPHTHALMOLOGY

## 2021-02-15 NOTE — PROGRESS NOTES
Natasha Garcia is a 63year old male.     HPI:     HPI     EP/ 63year old M here for a complete eye exam. LDE: 2/7/2020 (last seen on 10/9/2020) with a history of alternating esotropia, POAG (pt is taking Latanoprost in both eyes at bed time as directe Pen-injector      • TRESIBA FLEXTOUCH 200 UNIT/ML Subcutaneous Solution Pen-injector      • NAFTIN 1 % External Gel [place] AA BID  1   • Accu-Chek Soft Touch Lancets Does not apply Misc      • Glucose Blood (ACCU-CHEK ADVANTAGE TEST) In Vitro Strip      • Ins Right -4.25 +1.50 060    Left -2.50 Sphere     Type: Reading only                 ASSESSMENT/PLAN:     Diagnoses and Plan:     Myopia of both eyes with astigmatism and presbyopia  Same glasses    Primary open angle glaucoma (POAG) of both eyes  IOP 17/1

## 2021-02-15 NOTE — PATIENT INSTRUCTIONS
Myopia of both eyes with astigmatism and presbyopia  Same glasses    Primary open angle glaucoma (POAG) of both eyes  IOP 17/17 adj 15/13   OCT from October visit stable both eyes.   Continue Latanoprost both eyes at night    BDR (background diabetic retino

## 2021-02-15 NOTE — ASSESSMENT & PLAN NOTE
IOP 17/17 adj 15/13   OCT from October visit stable both eyes.   Continue Latanoprost both eyes at night

## 2021-04-17 NOTE — ASSESSMENT & PLAN NOTE
Follow up with Dr. Krystian bravo. Hospital Medicine History & Physical      PCP: ISADORA Myers - CNP    Date of Admission: 4/17/2021    Date of Service: Pt seen/examined on 4/17/2021     Chief Complaint:    Chief Complaint   Patient presents with    Abdominal Pain         History Of Present Illness: The patient is a 82 y.o. female with a PMH of Persistent Atrial Fibrillation, Chronic Diastolic CHF, DM type II, HTN, HLD, Hx of Colon Cancer who presented to the ED with complaint of abdominal pain that started around 2 this morning. Reports it is located in the middle of her abdomen. Did have a couple episodes of non-bloody emesis in the ED. Has had chronic diarrhea for about a year. States she has had a colonoscopy which was unrevealing (No colonoscopy notes noted in [facility] or Care Everywhere within the last year). Denies fevers, chills, melena, hematochezia, SOB or chest pain. She recently had her GB removed on 3/2021. She does not smoke cigarettes or drink alcohol. She does have a hx of transverse colon cancer s/p ileotransverse colectomy with ileal distal transverse anastomosis. Past Medical History:        Diagnosis Date    [facility]     Cancer ([facility]     skin cancer 2014; colon 2015    Clostridium difficile diarrhea 06/25/2015    per physicians notes; negative on 6/25/15    Clostridium difficile infection 12/09/2016    COVID-19 01/13/2021    Diabetes mellitus ([facility]     Hyperlipidemia     Hypertension        Past Surgical History:        Procedure Laterality Date    ABDOMEN SURGERY Right 10/15/2015    right colectomy, pain ball    ABDOMEN SURGERY  12/06/2016    trasverse colectomy    ANGIOPLASTY      right foot.     CHOLECYSTECTOMY  03/02/2021    : LAPAROSCOPIC CHOLECYSTECTOMY     CHOLECYSTECTOMY, LAPAROSCOPIC N/A 3/2/2021    LAPAROSCOPIC CHOLECYSTECTOMY performed by Asia Du MD at [address] COLONOSCOPY  2015    ascending colon mass, diverticulosis, hemorrhoids    COLONOSCOPY 11/08/2016    Transverse colon mass; Diverticulosis; Hemorrhoids    HERNIA REPAIR      x2    OTHER SURGICAL HISTORY Left 9/29/2014    excision of basal cell skin cancer Left face with full thickness skin graph from abdomen    SKIN GRAFT      UPPER GASTROINTESTINAL ENDOSCOPY      VASCULAR SURGERY         Medications Prior to Admission:    Prior to Admission medications    Medication Sig Start Date End Date Taking? Authorizing Provider   metoprolol tartrate (LOPRESSOR) 50 MG tablet Take 1 tablet by mouth 2 times daily 4/8/21  Yes Eva Stuart MD   potassium chloride (KLOR-CON M) 20 MEQ extended release tablet Take 1 tablet by mouth daily 3/25/21  Yes DESIRAE Torres   atorvastatin (LIPITOR) 10 MG tablet Take 1 tablet by mouth daily 2/26/21  Yes Eva Stuart MD   glyBURIDE (DIABETA) 5 MG tablet Take 7.5 mg by mouth daily (with breakfast). Yes Historical Provider, MD   furosemide (LASIX) 40 MG tablet Take 1 tablet by mouth daily 3/25/21   DESIRAE Torres   warfarin (COUMADIN) 2.5 MG tablet Take 3 tablets by mouth daily for 3 days, THEN 2 tablets daily for 1 day. Then see the coumadin clinic for blood check and further dosing. 3/25/21 3/29/21  DESIRAE Torres       Allergies:  Iohexol, Clindamycin/lincomycin, Metronidazole, Pantoprazole sodium, and Vancomycin    Social History:  The patient currently lives at home. TOBACCO:   reports that she has never smoked. She has never used smokeless tobacco.  ETOH:   reports no history of alcohol use.       Family History:   Positive as follows:        Problem Relation Age of Onset   [employer] Cancer Mother     Cancer Father        REVIEW OF SYSTEMS:     Constitutional: Negative for fever   HENT: Negative for sore throat   Eyes: Negative for redness   Respiratory: Negative  for dyspnea, cough   Cardiovascular: Negative for chest pain   Gastrointestinal: + abdominal pain, nausea, vomiting, diarrhea; no melena or hematochezia   Genitourinary: Negative for hematuria   Musculoskeletal: Negative for arthralgias   Skin: Negative for rash   Neurological: Negative for syncope   Hematological: Negative for adenopathy   Psychiatric/Behavorial: Negative for anxiety    PHYSICAL EXAM:    /67   Pulse 104   Temp 97.3 °F (36.3 °C) (Oral)   Resp 18   Ht 5' 5\" (1.651 m)   Wt 190 lb (86.2 kg)   SpO2 96%   BMI 31.62 kg/m²   Gen: No distress. Alert. Elderly  female, obese  Eyes: No sclera icterus. No conjunctival injection. Neck: Trachea midline. Resp: No accessory muscle use. No crackles. No wheezes. No rhonchi. On RA  CV: Irregularly irregular. + loud murmur. No rub. No edema. GI: Soft, diffuse abdominal pain - greatest in periumbilical region with voluntary guarding, Non-distended. Active bowel sounds. Skin: Warm and dry. No rash on exposed extremities. Neuro: Awake. Grossly non-focal, moves all extremities, follows commands, no dysarthria   Psych: Oriented x 3. No anxiety or agitation. I Hari Sorensen have reviewed the chart on [facility] and personally interviewed and examined patient, reviewed the data (labs and imaging) and after discussion with my PA formulated the plan. Agree with note with the following edits. HPI:     I reviewed the patient's Past Medical History, Past Surgical History, Medications, and Allergies. 82 y.o. female with a PMH of Persistent Atrial Fibrillation, Chronic Diastolic CHF, DM type II, HTN, HLD, Hx of Colon Cancer who presented to the ED with complaint of abdominal pain that started around 2 this morning. Reports it is located in the middle of her abdomen. Did have a couple episodes of non-bloody emesis in the ED. Has had chronic diarrhea for about a year. States she has had a colonoscopy which was unrevealing        General: elderly female, sick appearing   Awake, alert and oriented.  Appears to be not in any distress  Mucous Membranes:  Pink , anicteric  Neck: No JVD, no carotid bruit, no thyromegaly  Chest:  Clear to auscultation bilaterally, diminished in bases  Cardiovascular:  Irregular S1S2 heard, + ASM  Abdomen:  Soft, undistended, mild diffuse mid abd tenderness , no organomegaly, BS present  Extremities: No edema or cyanosis.  Distal pulses well felt  Neurological : grossly normal            CBC:   Recent Labs     04/17/21  0404   WBC 13.0*   HGB 14.4   HCT 45.3   MCV 89.3        BMP:   Recent Labs     04/17/21  0404      K 3.6      CO2 25   BUN 19   CREATININE 0.8     LIVER PROFILE:   Recent Labs     04/17/21  0404   AST 31   ALT 26   LIPASE 20.0   BILITOT 1.4*   ALKPHOS 149*     PT/INR:   Recent Labs     04/17/21 0404   PROTIME 26.4*   INR 2.33*     APTT:   Recent Labs     04/17/21  0404   APTT 32.1     UA:  Recent Labs     04/17/21  0502   COLORU Yellow   PHUR 5.0   WBCUA 0-2   RBCUA 0-2   BACTERIA 3+*   CLARITYU SL CLOUDY*   SPECGRAV >=1.030   LEUKOCYTESUR Negative   UROBILINOGEN 0.2   BILIRUBINUR Negative   BLOODU TRACE-LYSED*   GLUCOSEU 100*        CARDIAC ENZYMES  Recent Labs     04/17/21 0404   TROPONINI <0.01       U/A:    Lab Results   Component Value Date    COLORU Yellow 04/17/2021    WBCUA 0-2 04/17/2021    RBCUA 0-2 04/17/2021    BACTERIA 3+ 04/17/2021    CLARITYU SL CLOUDY 04/17/2021    SPECGRAV >=1.030 04/17/2021    LEUKOCYTESUR Negative 04/17/2021    BLOODU TRACE-LYSED 04/17/2021    GLUCOSEU 100 04/17/2021    AMORPHOUS 4+ 06/19/2015       ABG    Lab Results   Component Value Date    PBB0OQI 15.6 06/20/2015    BEART -9 06/20/2015    K4FACMJR 94 06/20/2015    PHART 7.346 06/20/2015    ABW8DYK 29 06/20/2015    PO2ART 72 06/20/2015    LVA0OSV 16 06/20/2015       CULTURES    SARS-COV-2 - Rapid: Not detected     C diff toxin: indeterminate    EKG:  I have reviewed the EKG with the following interpretation:   Atrial fibrillation with rapid ventricular response rate of 107  Low voltage QRS  Cannot rule out Anterior infarct , age undetermined  Abnormal ECG  No previous ECGs available    RADIOLOGY    CT ABDOMEN PELVIS WO CONTRAST Additional Contrast? None   Final Result   1. Interval worsening of now mid to distal jejunal surrounding mesenteric fat   stranding suggesting association with acute enteritis proximal to small bowel   ostomy site. Note: Evaluation of infectious or inflammatory process limited   in absence of intravenous iodinated contrast administration. 2. Mild omental fat herniation at small bowel ostomy. 3. Severe colonic diverticulosis without evidence of diverticulitis. 4. Splenic chronic granulomatous disease. 5. Multifocal small calcified uterine leiomyomata. 6. Interval cholecystectomy. Pertinent previous results reviewed     TTE 2/18/2021  Conclusions      Summary   Normal left ventricular systolic function with an estimated ejection   fraction of 55-60%. No regional wall motion abnormalities are seen. There is moderate concentric left ventricular hypertrophy. The left atrium is mildly dilated. Mild posterior mitral annular calcification is present. Mild mitral regurgitation. Moderate aortic stenosis and moderate aortic regurgitation is present. Normal systolic pulmonary artery pressure (SPAP) estimated at 31 mmHg (RA   pressure 3 mmHg).     ASSESSMENT/PLAN:    Acute Enteritis  - CT interval worsening of the mid to distal jejunal surrounding mesenteric fat stranding - proximal to small bowel ostomy site  - Admit to Med Surg  - NPO, start Cipro IV, IVF, PRN morphine for pain while NPO    Lactic Acidosis  - 2.5 > 2.1  - IVF  - trending down    Leukocytosis  - 13  - likely 2/2 above  - monitor CBC, mgmt as above    Chronic Diastolic CHF  - appears compensated  - last echo 2/2021: EF of 55 to 60%, mod LVH, moderate aortic stenosis  - hold Lasix, cont Lopressor    Chronic Atrial Fibrillation  - diagnosed in Feb 2021  - cont BB; AC on Coumadin - pharm to dose; INR 2.33    Aortic stenosis  - Noted on most recent echo--> moderate  - Following with cardiology    Type II DM  - uncontrolled with BG in 240-250s  - last Hgb A1c: 6.7 in 2/2021  - hold Diabeta, add low dose SSI  - POC Glucose, carb control diet    HTN  - controlled  - cont Lopressor    Mixed HLD  - cont Lipitor    PAD  - Status post fasciotomies and revascularization of the right lower extremity via anterior tib artery angioplasty  - Followed by vascular surgery  - on Coumadin, cont Lipitor    Obesity  - Body mass index is 31.62 kg/m². - Counseled on weight loss.      Hx of Colon Cancer  - s/p ileotransverse colectomy with ileal distal transverse anastomosis    DVT Prophylaxis: Coumadin - pharm to dose  Diet: Diet NPO Effective Now  Code Status: Full Code    Maynor Bazzi PA-C 2:09 PM 4/17/2021     Agree with above  Changes made to note    Doreen aKmara MD 4/17/2021 6:29 PM

## 2021-06-18 ENCOUNTER — OFFICE VISIT (OUTPATIENT)
Dept: OPHTHALMOLOGY | Facility: CLINIC | Age: 66
End: 2021-06-18
Payer: COMMERCIAL

## 2021-06-18 DIAGNOSIS — E11.3299 BDR (BACKGROUND DIABETIC RETINOPATHY) (HCC): ICD-10-CM

## 2021-06-18 DIAGNOSIS — H40.1131 PRIMARY OPEN ANGLE GLAUCOMA (POAG) OF BOTH EYES, MILD STAGE: Primary | ICD-10-CM

## 2021-06-18 DIAGNOSIS — H50.05 ALTERNATING ESOTROPIA: ICD-10-CM

## 2021-06-18 DIAGNOSIS — E11.311 DIABETIC MACULAR EDEMA OF BOTH EYES (HCC): ICD-10-CM

## 2021-06-18 PROCEDURE — 92133 CPTRZD OPH DX IMG PST SGM ON: CPT | Performed by: OPHTHALMOLOGY

## 2021-06-18 PROCEDURE — 92012 INTRM OPH EXAM EST PATIENT: CPT | Performed by: OPHTHALMOLOGY

## 2021-06-18 RX ORDER — LATANOPROST 50 UG/ML
SOLUTION/ DROPS OPHTHALMIC
Qty: 1 EACH | Refills: 3 | Status: SHIPPED | OUTPATIENT
Start: 2021-06-18 | End: 2021-10-22

## 2021-06-18 RX ORDER — BEMPEDOIC ACID AND EZETIMIBE 180; 10 MG/1; MG/1
1 TABLET, FILM COATED ORAL DAILY
COMMUNITY
Start: 2021-05-07

## 2021-06-18 NOTE — PATIENT INSTRUCTIONS
Primary open angle glaucoma (POAG) of both eyes  IOP 16/16 adj 14/12   OCT  stable both eyes. Continue Latanoprost both eyes at night    Diabetic macular edema of both eyes ([address]  Has appointment today with Dr. Jenny Vega for injection in each eye.     BDR (

## 2021-06-20 NOTE — PROGRESS NOTES
Chuy Victor is a 63year old male. HPI:     HPI     EP/63year old here for an IOP check and OCT. LDE was 2/15/21 with a history of POAG OU he is taking Latanoprost OU QHS as directed.   He also has a history of myopia with astigmatism and presby daily.     • latanoprost 0.005 % Ophthalmic Solution Instill 1 drop by ophthalmic route every night in both eyes 1 each 3   • NOVOLOG FLEXPEN 100 UNIT/ML Subcutaneous Solution Pen-injector      • TRESIBA FLEXTOUCH 200 UNIT/ML Subcutaneous Solution Pen-inje Cylinder Axis    Right -4.00 +1.50 060    Left -3.50 Sphere     Type: Computer single vision                 ASSESSMENT/PLAN:     Diagnoses and Plan:     Primary open angle glaucoma (POAG) of both eyes  IOP 16/16 adj 14/12   OCT  stable both eyes.   Continu

## 2021-08-24 ENCOUNTER — MED REC SCAN ONLY (OUTPATIENT)
Dept: OPHTHALMOLOGY | Facility: CLINIC | Age: 66
End: 2021-08-24

## 2021-09-30 ENCOUNTER — MED REC SCAN ONLY (OUTPATIENT)
Dept: OPHTHALMOLOGY | Facility: CLINIC | Age: 66
End: 2021-09-30

## 2021-10-22 ENCOUNTER — OFFICE VISIT (OUTPATIENT)
Dept: OPHTHALMOLOGY | Facility: CLINIC | Age: 66
End: 2021-10-22
Payer: COMMERCIAL

## 2021-10-22 DIAGNOSIS — H50.05 ALTERNATING ESOTROPIA: ICD-10-CM

## 2021-10-22 DIAGNOSIS — E11.311 DIABETIC MACULAR EDEMA OF BOTH EYES (HCC): ICD-10-CM

## 2021-10-22 DIAGNOSIS — H52.13 MYOPIA OF BOTH EYES WITH ASTIGMATISM AND PRESBYOPIA: ICD-10-CM

## 2021-10-22 DIAGNOSIS — H52.4 MYOPIA OF BOTH EYES WITH ASTIGMATISM AND PRESBYOPIA: ICD-10-CM

## 2021-10-22 DIAGNOSIS — H40.1131 PRIMARY OPEN ANGLE GLAUCOMA (POAG) OF BOTH EYES, MILD STAGE: Primary | ICD-10-CM

## 2021-10-22 DIAGNOSIS — H52.203 MYOPIA OF BOTH EYES WITH ASTIGMATISM AND PRESBYOPIA: ICD-10-CM

## 2021-10-22 PROCEDURE — 92012 INTRM OPH EXAM EST PATIENT: CPT | Performed by: OPHTHALMOLOGY

## 2021-10-22 PROCEDURE — 92133 CPTRZD OPH DX IMG PST SGM ON: CPT | Performed by: OPHTHALMOLOGY

## 2021-10-22 RX ORDER — LATANOPROST 50 UG/ML
SOLUTION/ DROPS OPHTHALMIC
Qty: 3 EACH | Refills: 4 | Status: SHIPPED | OUTPATIENT
Start: 2021-10-22

## 2021-10-22 NOTE — PROGRESS NOTES
Romayne Maize is a 63year old male. HPI:     HPI     EP/63year old here for an IOP check.   LDE was 2/15/21 he was last seen on 6/18/21 OCT done for POAG OU, diabetic macular edema OU, BDR OU and alternating esotropia (he is not interested in surg drop by ophthalmic route every night in both eyes 3 each 4   • NEXLIZET 180-10 MG Oral Tab Take 1 tablet by mouth daily.      • NOVOLOG FLEXPEN 100 UNIT/ML Subcutaneous Solution Pen-injector      • TRESIBA FLEXTOUCH 200 UNIT/ML Subcutaneous Solution Pen-inj Primary open angle glaucoma (POAG) of both eyes  IOP 16/16 adj 14/12   OCT  stable both eyes.   Continue Latanoprost both eyes at night    Myopia of both eyes with astigmatism and presbyopia  Same glasses    Diabetic macular edema of both eyes ([address]  Flavia Mora

## 2021-10-22 NOTE — PATIENT INSTRUCTIONS
Primary open angle glaucoma (POAG) of both eyes  IOP 16/16 adj 14/12   OCT  stable both eyes.   Continue Latanoprost both eyes at night    Myopia of both eyes with astigmatism and presbyopia  Same glasses    Diabetic macular edema of both eyes ([address]  Has ap

## 2022-01-17 PROCEDURE — 3046F HEMOGLOBIN A1C LEVEL >9.0%: CPT | Performed by: INTERNAL MEDICINE

## 2022-02-25 ENCOUNTER — OFFICE VISIT (OUTPATIENT)
Dept: INTERNAL MEDICINE CLINIC | Facility: CLINIC | Age: 67
End: 2022-02-25
Payer: COMMERCIAL

## 2022-02-25 ENCOUNTER — OFFICE VISIT (OUTPATIENT)
Dept: OPHTHALMOLOGY | Facility: CLINIC | Age: 67
End: 2022-02-25
Payer: COMMERCIAL

## 2022-02-25 VITALS
DIASTOLIC BLOOD PRESSURE: 60 MMHG | HEART RATE: 65 BPM | HEIGHT: 73 IN | BODY MASS INDEX: 29.69 KG/M2 | SYSTOLIC BLOOD PRESSURE: 110 MMHG | OXYGEN SATURATION: 98 % | WEIGHT: 224 LBS

## 2022-02-25 DIAGNOSIS — H01.02B SQUAMOUS BLEPHARITIS OF UPPER AND LOWER EYELIDS OF BOTH EYES: ICD-10-CM

## 2022-02-25 DIAGNOSIS — E11.311 DIABETIC MACULAR EDEMA OF BOTH EYES (HCC): ICD-10-CM

## 2022-02-25 DIAGNOSIS — H01.02A SQUAMOUS BLEPHARITIS OF UPPER AND LOWER EYELIDS OF BOTH EYES: ICD-10-CM

## 2022-02-25 DIAGNOSIS — Z00.00 WELLNESS EXAMINATION: Primary | ICD-10-CM

## 2022-02-25 DIAGNOSIS — Z12.5 PROSTATE CANCER SCREENING: ICD-10-CM

## 2022-02-25 DIAGNOSIS — H52.4 MYOPIA OF BOTH EYES WITH ASTIGMATISM AND PRESBYOPIA: ICD-10-CM

## 2022-02-25 DIAGNOSIS — E11.9 DIABETES MELLITUS TYPE 2 IN NONOBESE (HCC): ICD-10-CM

## 2022-02-25 DIAGNOSIS — Z12.11 COLON CANCER SCREENING: ICD-10-CM

## 2022-02-25 DIAGNOSIS — H40.1131 PRIMARY OPEN ANGLE GLAUCOMA (POAG) OF BOTH EYES, MILD STAGE: Primary | ICD-10-CM

## 2022-02-25 DIAGNOSIS — H52.13 MYOPIA OF BOTH EYES WITH ASTIGMATISM AND PRESBYOPIA: ICD-10-CM

## 2022-02-25 DIAGNOSIS — H52.203 MYOPIA OF BOTH EYES WITH ASTIGMATISM AND PRESBYOPIA: ICD-10-CM

## 2022-02-25 DIAGNOSIS — H50.05 ALTERNATING ESOTROPIA: ICD-10-CM

## 2022-02-25 PROBLEM — H40.10X0 OPEN-ANGLE GLAUCOMA OF LEFT EYE: Status: RESOLVED | Noted: 2019-02-04 | Resolved: 2022-02-25

## 2022-02-25 PROBLEM — H40.003 GLAUCOMA SUSPECT OF BOTH EYES: Status: RESOLVED | Noted: 2019-01-07 | Resolved: 2022-02-25

## 2022-02-25 LAB — COMPLEXED PSA SERPL-MCNC: 2.61 NG/ML (ref ?–4)

## 2022-02-25 PROCEDURE — 3074F SYST BP LT 130 MM HG: CPT | Performed by: INTERNAL MEDICINE

## 2022-02-25 PROCEDURE — 92133 CPTRZD OPH DX IMG PST SGM ON: CPT | Performed by: OPHTHALMOLOGY

## 2022-02-25 PROCEDURE — 84153 ASSAY OF PSA TOTAL: CPT | Performed by: INTERNAL MEDICINE

## 2022-02-25 PROCEDURE — 92014 COMPRE OPH EXAM EST PT 1/>: CPT | Performed by: OPHTHALMOLOGY

## 2022-02-25 PROCEDURE — 3078F DIAST BP <80 MM HG: CPT | Performed by: INTERNAL MEDICINE

## 2022-02-25 PROCEDURE — 3008F BODY MASS INDEX DOCD: CPT | Performed by: INTERNAL MEDICINE

## 2022-02-25 PROCEDURE — 99396 PREV VISIT EST AGE 40-64: CPT | Performed by: INTERNAL MEDICINE

## 2022-02-25 PROCEDURE — 92015 DETERMINE REFRACTIVE STATE: CPT | Performed by: OPHTHALMOLOGY

## 2022-02-25 NOTE — PATIENT INSTRUCTIONS
Primary open angle glaucoma (POAG) of both eyes  IOP good 13/15 adj 11/11   OCT some mild progressoion both eyes, but difficult exam  Recheck 4 months OCT and IOP  Continue Latanoprost both eyes at night    Diabetic macular edema of both eyes ([address]  Has upcomng appointment  with Dr. Celina Barrios for possible  injection in each eye. Blepharitis of upper and lower eyelids of both eyes  Patient instructed to use lid hygiene daily. Apply baby shampoo to warm washcloth and scrub eyelids gently with eyes closed, then rinse thoroughly. Alternating esotropia  Mostly RET. Longstanding, stable. Not interested in eye muscle surgery.

## 2022-02-25 NOTE — ASSESSMENT & PLAN NOTE
IOP good 13/15 adj 11/11   OCT some mild progressoion both eyes, but difficult exam  Recheck 4 months OCT and IOP  Continue Latanoprost both eyes at night

## 2022-03-22 NOTE — PATIENT INSTRUCTIONS
BDR (background diabetic retinopathy) ([facility] [address]  Macular edema under the care of Dr. Quang Gonzalez. Follow up with him as scheduled soon. Primary open angle glaucoma (POAG) of left eye, mild stage  Continue Latanoprost both eyes at night.  IOP good today 15/15 ad Winlevi Pregnancy And Lactation Text: This medication is considered safe during pregnancy and breastfeeding.

## 2022-05-26 NOTE — ASSESSMENT & PLAN NOTE
Longstanding; no treatment. Patient had strabismus surgery as a child. Cephalexin Counseling: I counseled the patient regarding use of cephalexin as an antibiotic for prophylactic and/or therapeutic purposes. Cephalexin (commonly prescribed under brand name Keflex) is a cephalosporin antibiotic which is active against numerous classes of bacteria, including most skin bacteria. Side effects may include nausea, diarrhea, gastrointestinal upset, rash, hives, yeast infections, and in rare cases, hepatitis, kidney disease, seizures, fever, confusion, neurologic symptoms, and others. Patients with severe allergies to penicillin medications are cautioned that there is about a 10% incidence of cross-reactivity with cephalosporins. When possible, patients with penicillin allergies should use alternatives to cephalosporins for antibiotic therapy.

## 2022-06-30 ENCOUNTER — HOSPITAL ENCOUNTER (OUTPATIENT)
Age: 67
Discharge: HOME | End: 2022-06-30
Payer: COMMERCIAL

## 2022-06-30 VITALS
DIASTOLIC BLOOD PRESSURE: 54 MMHG | OXYGEN SATURATION: 99 % | HEART RATE: 74 BPM | BODY MASS INDEX: 30.48 KG/M2 | TEMPERATURE: 99 F | RESPIRATION RATE: 18 BRPM | HEIGHT: 73 IN | SYSTOLIC BLOOD PRESSURE: 117 MMHG | WEIGHT: 230 LBS

## 2022-06-30 DIAGNOSIS — U07.1 COVID-19 VIRUS INFECTION: Primary | ICD-10-CM

## 2022-06-30 DIAGNOSIS — Z11.52 ENCOUNTER FOR SCREENING FOR COVID-19: ICD-10-CM

## 2022-06-30 LAB — SARS-COV-2 RNA RESP QL NAA+PROBE: DETECTED

## 2022-06-30 PROCEDURE — 99203 OFFICE O/P NEW LOW 30 MIN: CPT

## 2022-06-30 PROCEDURE — 99214 OFFICE O/P EST MOD 30 MIN: CPT

## 2022-06-30 RX ORDER — BEBTELOVIMAB 87.5 MG/ML
175 INJECTION, SOLUTION INTRAVENOUS ONCE
Status: COMPLETED | OUTPATIENT
Start: 2022-06-30 | End: 2022-06-30

## 2022-07-01 ENCOUNTER — TELEPHONE (OUTPATIENT)
Dept: CASE MANAGEMENT | Age: 67
End: 2022-07-01

## 2022-07-01 NOTE — TELEPHONE ENCOUNTER
Pt received MAB infusion at [address] [place] [address] on 6/30/22 for COVID-19. Please follow-up with pt for post-infusion assessment and home monitoring if needed. Thank you.

## 2022-08-30 DIAGNOSIS — H40.1131 PRIMARY OPEN ANGLE GLAUCOMA (POAG) OF BOTH EYES, MILD STAGE: ICD-10-CM

## 2022-08-30 RX ORDER — LATANOPROST 50 UG/ML
SOLUTION/ DROPS OPHTHALMIC
Qty: 7.5 ML | Refills: 3 | Status: SHIPPED | OUTPATIENT
Start: 2022-08-30

## 2022-09-29 ENCOUNTER — TELEPHONE (OUTPATIENT)
Dept: GASTROENTEROLOGY | Facility: CLINIC | Age: 67
End: 2022-09-29

## 2022-09-29 ENCOUNTER — OFFICE VISIT (OUTPATIENT)
Dept: GASTROENTEROLOGY | Facility: CLINIC | Age: 67
End: 2022-09-29

## 2022-09-29 VITALS
DIASTOLIC BLOOD PRESSURE: 62 MMHG | HEART RATE: 67 BPM | SYSTOLIC BLOOD PRESSURE: 114 MMHG | WEIGHT: 230.38 LBS | BODY MASS INDEX: 30.53 KG/M2 | HEIGHT: 73 IN

## 2022-09-29 DIAGNOSIS — K62.89 RECTAL IRRITATION: ICD-10-CM

## 2022-09-29 DIAGNOSIS — Z12.12 SCREENING FOR COLORECTAL CANCER: Primary | ICD-10-CM

## 2022-09-29 DIAGNOSIS — R19.8 RECTAL DISCHARGE: ICD-10-CM

## 2022-09-29 DIAGNOSIS — K59.00 CONSTIPATION, UNSPECIFIED CONSTIPATION TYPE: ICD-10-CM

## 2022-09-29 DIAGNOSIS — Z12.11 SCREENING FOR COLORECTAL CANCER: Primary | ICD-10-CM

## 2022-09-29 PROCEDURE — 3078F DIAST BP <80 MM HG: CPT | Performed by: INTERNAL MEDICINE

## 2022-09-29 PROCEDURE — 3008F BODY MASS INDEX DOCD: CPT | Performed by: INTERNAL MEDICINE

## 2022-09-29 PROCEDURE — 99243 OFF/OP CNSLTJ NEW/EST LOW 30: CPT | Performed by: INTERNAL MEDICINE

## 2022-09-29 PROCEDURE — 3074F SYST BP LT 130 MM HG: CPT | Performed by: INTERNAL MEDICINE

## 2022-09-29 NOTE — TELEPHONE ENCOUNTER
Scheduled for:  Colonoscopy [id number]    Provider Name:  Camryn Coffman  Date:  1/18/2023  Location:  [facility]  Sedation:  Mac   Time:  10:15 Am (pt is aware that [facility] will call the day before to confirm arrival time)     Prep: Golytely or Trilyte  Prep instructions were given to pt in the office, pt verbalized understanding. Meds/Allergies Reconciled?:  Physician reviewed     Diagnosis with codes:  Royce Fail -Z12.11,Z12.12   Was patient informed to call insurance with codes (Y/N):  Yes, I confirmed [employer] [place] insurance with the patient. The patient also verbally understands to call his  insurance to check for pre-cert, codes were given on prep instructions. Referral sent?:   Yes Referral was sent at the time of electronic surgical scheduling. [facility] or [id number] notified?:    Electronic case request was sent to [facility] via [employer]. Medication Orders: This patient verbally confirmed that he is not taking:   Iron, blood thinners, BP meds, and is  Diabetic. - we will need to contact patient Endocrinologist for his Insulin adjustment prior to procedure . Not latex allergy, Not PCN allergy and does not have a pacemaker Pt is aware to NOT take iron pills, herbal meds and diet supplements for 7 days before exam. Also to NOT take any form of alcohol, recreational drugs and any forms of ED meds 24 hours before exam.    Misc Orders:  Patient was informed that they will need a COVID 19 test prior to their procedure. Patient verbally understood & will await a phone call from [facility] to schedule.       Further instructions given by staff:

## 2022-09-29 NOTE — PATIENT INSTRUCTIONS
Colonoscopy  1. Schedule colonoscopy with monitored anesthesia care (MAC) at [facility] or [facility] [dx: colorectal cancer screening]    2.  bowel prep from pharmacy (split trilyte or golytely). As we discussed it is important to take the bowel preparation in two parts taking 2L of the liquid the night before the procedure and the second 2L the morning of the procedure starting approximately 6 hours prior to your scheduled procedure time. 3. Medication    Please call your endocrinologist to discuss adjustment of your insulin for the procedure  Otherwise, continue all medications for procedure    4. Read all bowel prep instructions carefully    5. AVOID seeds, nuts, popcorn, raw fruits and vegetables (cooked is okay) for 2-3 days before procedure    >>>Please note: if you were prescribed a bowel prep and it is too expensive or not covered by insurance, it is okay to substitute Trilyte or Golytely (or any similar generic prep). This can be done by notifying the pharmacy or calling our office. Constipation  Fiber supplementation - You should get 20 to 35 grams of fiber a day. Not all fibers are the same. I recommend starting with a psyllium fiber like metamucil. If not helping, try Citrucel and [place] [employer]uerger are good ones that can be purchased over the counter as well to try.

## 2022-11-14 NOTE — TELEPHONE ENCOUNTER
Feeling better. Afebrile. Less coughing and less chills. Had no questions for RN. No further home monitoring calls needed. Advanced care planning/counseling discussion

## 2023-02-27 PROCEDURE — 3046F HEMOGLOBIN A1C LEVEL >9.0%: CPT | Performed by: INTERNAL MEDICINE

## 2023-03-06 ENCOUNTER — OFFICE VISIT (OUTPATIENT)
Dept: INTERNAL MEDICINE CLINIC | Facility: CLINIC | Age: 68
End: 2023-03-06
Payer: COMMERCIAL

## 2023-03-06 VITALS
WEIGHT: 224 LBS | SYSTOLIC BLOOD PRESSURE: 136 MMHG | HEIGHT: 73 IN | DIASTOLIC BLOOD PRESSURE: 80 MMHG | BODY MASS INDEX: 29.69 KG/M2

## 2023-03-06 DIAGNOSIS — G47.39 OTHER SLEEP APNEA: ICD-10-CM

## 2023-03-06 DIAGNOSIS — Z00.00 WELLNESS EXAMINATION: Primary | ICD-10-CM

## 2023-03-06 DIAGNOSIS — E11.65 UNCONTROLLED TYPE 2 DIABETES MELLITUS WITH HYPERGLYCEMIA (HCC): ICD-10-CM

## 2023-03-06 LAB
ALBUMIN SERPL-MCNC: 3.5 G/DL (ref 3.4–5)
ALBUMIN/GLOB SERPL: 0.9 {RATIO} (ref 1–2)
ALP LIVER SERPL-CCNC: 89 U/L
ALT SERPL-CCNC: 24 U/L
ANION GAP SERPL CALC-SCNC: 3 MMOL/L (ref 0–18)
AST SERPL-CCNC: 21 U/L (ref 15–37)
BILIRUB SERPL-MCNC: 0.3 MG/DL (ref 0.1–2)
BUN BLD-MCNC: 21 MG/DL (ref 7–18)
BUN/CREAT SERPL: 18.1 (ref 10–20)
CALCIUM BLD-MCNC: 9.3 MG/DL (ref 8.5–10.1)
CHLORIDE SERPL-SCNC: 107 MMOL/L (ref 98–112)
CHOLEST SERPL-MCNC: 204 MG/DL (ref ?–200)
CO2 SERPL-SCNC: 30 MMOL/L (ref 21–32)
COMPLEXED PSA SERPL-MCNC: 2.94 NG/ML (ref ?–4)
CREAT BLD-MCNC: 1.16 MG/DL
CREAT UR-SCNC: 91.1 MG/DL
FASTING PATIENT LIPID ANSWER: YES
FASTING STATUS PATIENT QL REPORTED: YES
GFR SERPLBLD BASED ON 1.73 SQ M-ARVRAT: 70 ML/MIN/1.73M2 (ref 60–?)
GLOBULIN PLAS-MCNC: 3.7 G/DL (ref 2.8–4.4)
GLUCOSE BLD-MCNC: 224 MG/DL (ref 70–99)
HDLC SERPL-MCNC: 43 MG/DL (ref 40–59)
LDLC SERPL CALC-MCNC: 145 MG/DL (ref ?–100)
MICROALBUMIN UR-MCNC: 2.74 MG/DL
MICROALBUMIN/CREAT 24H UR-RTO: 30.1 UG/MG (ref ?–30)
NONHDLC SERPL-MCNC: 161 MG/DL (ref ?–130)
OSMOLALITY SERPL CALC.SUM OF ELEC: 300 MOSM/KG (ref 275–295)
POTASSIUM SERPL-SCNC: 4.6 MMOL/L (ref 3.5–5.1)
PROT SERPL-MCNC: 7.2 G/DL (ref 6.4–8.2)
SODIUM SERPL-SCNC: 140 MMOL/L (ref 136–145)
T4 FREE SERPL-MCNC: 0.9 NG/DL (ref 0.8–1.7)
TRIGL SERPL-MCNC: 89 MG/DL (ref 30–149)
TSI SER-ACNC: 4.03 MIU/ML (ref 0.36–3.74)
VLDLC SERPL CALC-MCNC: 17 MG/DL (ref 0–30)

## 2023-03-06 PROCEDURE — 80053 COMPREHEN METABOLIC PANEL: CPT | Performed by: INTERNAL MEDICINE

## 2023-03-06 PROCEDURE — 82570 ASSAY OF URINE CREATININE: CPT | Performed by: INTERNAL MEDICINE

## 2023-03-06 PROCEDURE — 3008F BODY MASS INDEX DOCD: CPT | Performed by: INTERNAL MEDICINE

## 2023-03-06 PROCEDURE — 84443 ASSAY THYROID STIM HORMONE: CPT | Performed by: INTERNAL MEDICINE

## 2023-03-06 PROCEDURE — 84153 ASSAY OF PSA TOTAL: CPT | Performed by: INTERNAL MEDICINE

## 2023-03-06 PROCEDURE — 82043 UR ALBUMIN QUANTITATIVE: CPT | Performed by: INTERNAL MEDICINE

## 2023-03-06 PROCEDURE — 80061 LIPID PANEL: CPT | Performed by: INTERNAL MEDICINE

## 2023-03-06 PROCEDURE — 3075F SYST BP GE 130 - 139MM HG: CPT | Performed by: INTERNAL MEDICINE

## 2023-03-06 PROCEDURE — 84439 ASSAY OF FREE THYROXINE: CPT | Performed by: INTERNAL MEDICINE

## 2023-03-06 PROCEDURE — 3079F DIAST BP 80-89 MM HG: CPT | Performed by: INTERNAL MEDICINE

## 2023-03-06 PROCEDURE — 99397 PER PM REEVAL EST PAT 65+ YR: CPT | Performed by: INTERNAL MEDICINE

## 2023-04-05 ENCOUNTER — LAB REQUISITION (OUTPATIENT)
Dept: SURGERY | Age: 68
End: 2023-04-05
Payer: COMMERCIAL

## 2023-04-05 ENCOUNTER — SURGERY CENTER DOCUMENTATION (OUTPATIENT)
Dept: SURGERY | Age: 68
End: 2023-04-05

## 2023-04-05 DIAGNOSIS — Z12.11 SPECIAL SCREENING FOR MALIGNANT NEOPLASMS, COLON: ICD-10-CM

## 2023-04-05 DIAGNOSIS — Z12.11 ENCOUNTER FOR COLORECTAL CANCER SCREENING: ICD-10-CM

## 2023-04-05 DIAGNOSIS — Z12.12 ENCOUNTER FOR COLORECTAL CANCER SCREENING: ICD-10-CM

## 2023-04-05 PROCEDURE — 45385 COLONOSCOPY W/LESION REMOVAL: CPT | Performed by: INTERNAL MEDICINE

## 2023-04-05 PROCEDURE — 88305 TISSUE EXAM BY PATHOLOGIST: CPT | Performed by: INTERNAL MEDICINE

## 2023-04-05 PROCEDURE — 45380 COLONOSCOPY AND BIOPSY: CPT | Performed by: INTERNAL MEDICINE

## 2023-04-10 ENCOUNTER — TELEPHONE (OUTPATIENT)
Dept: GASTROENTEROLOGY | Facility: CLINIC | Age: 68
End: 2023-04-10

## 2023-04-10 NOTE — TELEPHONE ENCOUNTER
Christy Givens MD  [facility] Clinical Staff  GI staff: please place recall for colonoscopy in 3 years      Letter sent on 4/10/2023  Health Maintenance updated   Patient Outreach updated

## 2023-08-22 DIAGNOSIS — H40.1131 PRIMARY OPEN ANGLE GLAUCOMA (POAG) OF BOTH EYES, MILD STAGE: ICD-10-CM

## 2023-08-24 RX ORDER — LATANOPROST 50 UG/ML
SOLUTION/ DROPS OPHTHALMIC
Qty: 7.5 ML | Refills: 3 | OUTPATIENT
Start: 2023-08-24

## 2023-08-24 NOTE — TELEPHONE ENCOUNTER
Pt does not need refills at this time.  Booked pt for 10/6/23 at 1:15pm due to being overdue for a yearly eye exam.

## 2023-09-22 ENCOUNTER — MED REC SCAN ONLY (OUTPATIENT)
Dept: INTERNAL MEDICINE CLINIC | Facility: CLINIC | Age: 68
End: 2023-09-22

## 2023-11-15 ENCOUNTER — MED REC SCAN ONLY (OUTPATIENT)
Dept: OPHTHALMOLOGY | Facility: CLINIC | Age: 68
End: 2023-11-15

## 2024-01-24 DIAGNOSIS — H40.1131 PRIMARY OPEN ANGLE GLAUCOMA (POAG) OF BOTH EYES, MILD STAGE: ICD-10-CM

## 2024-02-04 RX ORDER — LATANOPROST 50 UG/ML
SOLUTION/ DROPS OPHTHALMIC
Qty: 7.5 ML | Refills: 3 | Status: SHIPPED | OUTPATIENT
Start: 2024-02-04 | End: 2024-02-05

## 2024-02-05 ENCOUNTER — OFFICE VISIT (OUTPATIENT)
Dept: OPHTHALMOLOGY | Facility: CLINIC | Age: 69
End: 2024-02-05

## 2024-02-05 DIAGNOSIS — H01.02A SQUAMOUS BLEPHARITIS OF UPPER AND LOWER EYELIDS OF BOTH EYES: ICD-10-CM

## 2024-02-05 DIAGNOSIS — H52.4 MYOPIA OF BOTH EYES WITH ASTIGMATISM AND PRESBYOPIA: ICD-10-CM

## 2024-02-05 DIAGNOSIS — H40.1131 PRIMARY OPEN ANGLE GLAUCOMA (POAG) OF BOTH EYES, MILD STAGE: Primary | ICD-10-CM

## 2024-02-05 DIAGNOSIS — H01.02B SQUAMOUS BLEPHARITIS OF UPPER AND LOWER EYELIDS OF BOTH EYES: ICD-10-CM

## 2024-02-05 DIAGNOSIS — H52.203 MYOPIA OF BOTH EYES WITH ASTIGMATISM AND PRESBYOPIA: ICD-10-CM

## 2024-02-05 DIAGNOSIS — H25.13 AGE-RELATED NUCLEAR CATARACT OF BOTH EYES: ICD-10-CM

## 2024-02-05 DIAGNOSIS — H50.05 ALTERNATING ESOTROPIA: ICD-10-CM

## 2024-02-05 DIAGNOSIS — H52.13 MYOPIA OF BOTH EYES WITH ASTIGMATISM AND PRESBYOPIA: ICD-10-CM

## 2024-02-05 DIAGNOSIS — E11.311 DIABETIC MACULAR EDEMA OF BOTH EYES (HCC): ICD-10-CM

## 2024-02-05 RX ORDER — LATANOPROST 50 UG/ML
SOLUTION/ DROPS OPHTHALMIC
Qty: 7.5 ML | Refills: 3 | Status: SHIPPED | OUTPATIENT
Start: 2024-02-05

## 2024-02-07 NOTE — ASSESSMENT & PLAN NOTE
Under the care of Dr. Brito. Has had focal laser and multiple Eylea injections both eyes. Left macular edema resolved. Persistent Right macular edema as of last exam on 11/15/23 with Dr. Brito. Continue to follow with Dr. Brito as recommended.

## 2024-02-07 NOTE — ASSESSMENT & PLAN NOTE
IOP good 14/16 adj 12/12  off Latanoprost for about 5 days.   OCT some  progressoion both eyes.  Follow with Glaucoma specialist Dr. Manny Harry going forward. Make appointment soon. Contact info given.  Continue Latanoprost both eyes at night

## 2024-02-07 NOTE — PATIENT INSTRUCTIONS
Age-related nuclear cataract of both eyes  Moderate cataracts. Patient will be changing to Dr. Harry for glaucoma care as well as cataract surgery when needed.    Alternating esotropia  Mostly RET. Longstanding, stable. Not interested in eye muscle surgery.    Blepharitis of upper and lower eyelids of both eyes  Patient instructed to use lid hygiene daily.  Apply baby shampoo or Ocusoft to warm washcloth and scrub eyelids gently with eyes closed, then rinse thoroughly.        Diabetic macular edema of both eyes ([facility])  Under the care of Dr. Brito. Has had focal laser and multiple Eylea injections both eyes. Left macular edema resolved. Persistent Right macular edema as of last exam on 11/15/23 with Dr. Brito. Continue to follow with Dr. Brito as recommended.    Myopia of both eyes with astigmatism and presbyopia  New glasses. Distance only.    Primary open angle glaucoma (POAG) of both eyes  IOP good 14/16 adj 12/12  off Latanoprost for about 5 days.   OCT some  progressoion both eyes.  Follow with Glaucoma specialist Dr. Manny Harry going forward. Make appointment soon. Contact info given.  Continue Latanoprost both eyes at night

## 2024-02-07 NOTE — ASSESSMENT & PLAN NOTE
Moderate cataracts. Patient will be changing to Dr. Harry for glaucoma care as well as cataract surgery when needed.

## 2024-02-07 NOTE — ASSESSMENT & PLAN NOTE
Patient instructed to use lid hygiene daily.  Apply baby shampoo or Ocusoft to warm washcloth and scrub eyelids gently with eyes closed, then rinse thoroughly.

## 2024-02-07 NOTE — PROGRESS NOTES
Davin Hassan is a 68 year old male.    HPI:     HPI    EP/ 68 year old here for OCT and diabetic eye exam. Patient follows with [facility] for diabetic retinopathy on a regular basis, but has not seen Dr. De León since 2/25/22. Patient has renewed and continued Latanoprost for Glaucoma until recently, the last 5 days ran out of Latanoprost.  LDE:02/25/2022  Last OCT: 02/25/2022  Last VF: 4/1/19  Patient has POAG OU and is on Latanoprost at bedtime OU. (Pt ran out of eye drops and had an insurance change so he has not used his drops in the past 5 days)  He has an alternating esotropia.  He has BDR OU and diabetic macular edema OU and is under the care of Dr. Brito. He has a follow up scheduled with him on 02/21/24.  He is getting Eylea injections.  He has myopia, astigmatism and presbyopia.    Patient has no visual complaints.  He feels like vision is good with current glasses that are 2 years old.   Patient has had diabetes for 22 years. Using insulin alone.  Last HA1C was 8.2.  He sees Dr. Cas Gillette for endocrinologist.   Last edited by Belkis De León MD on 2/7/2024  3:09 PM.        Patient History:  Past Medical History:   Diagnosis Date    Alternating esotropia     Alternating esotropia 01/07/2019    BDR (background diabetic retinopathy) ([facility]) 2018    under the care of Dr. Brito- see progress note    Colon adenomas 04/05/2023    x4    Diabetic macular edema of left eye  ([facility]) 2018    S/P Eylea injection OS x 5 with Dr. Brito    Glaucoma suspect of both eyes 2018    Primary open angle glaucoma (POAG) of left eye, mild stage 02/04/2019 2/4/19 start Latanoprost qhs OS due to thinning of the optic nerve on OCT    Type II or unspecified type diabetes mellitus without mention of complication, not stated as uncontrolled        Surgical History: Davin Hassan has a past surgical history that includes Strabismus surgery (Bilateral, 1959); colonoscopy (03/2008); and colonoscopy  (04/05/2023).    Family History   Problem Relation Age of Onset    Diabetes Father     Glaucoma Father     Macular degeneration Father     Breast Cancer Mother     Heart Disorder Brother        Social History:   Social History     Socioeconomic History    Marital status:    Tobacco Use    Smoking status: Never    Smokeless tobacco: Never   Vaping Use    Vaping Use: Never used   Substance and Sexual Activity    Alcohol use: Yes     Alcohol/week: 1.0 standard drink of alcohol     Types: 1 Glasses of wine per week     Comment: rarely, beer & liquor, 1 drink     Drug use: No   Other Topics Concern    Caffeine Concern Yes     Comment: coffee, soda, 3 cups daily       Medications:  Current Outpatient Medications   Medication Sig Dispense Refill    latanoprost 0.005 % Ophthalmic Solution INSTILL 1 DROP INTO BOTH   EYES EVERY NIGHT 7.5 mL 3    NEXLIZET 180-10 MG Oral Tab Take 1 tablet by mouth daily.      NOVOLOG FLEXPEN 100 UNIT/ML Subcutaneous Solution Pen-injector Inject 100 Units into the skin 3 (three) times daily before meals. 10 to 12 units per scale      TRESIBA FLEXTOUCH 200 UNIT/ML Subcutaneous Solution Pen-injector Inject 200 Units into the skin daily. 36 to 44 units at night      NAFTIN 1 % External Gel SOCORRO AA BID  1    Accu-Chek Soft Touch Lancets Does not apply Misc       Glucose Blood In Vitro Strip       Insulin Pen Needle 31G X 6 MM Does not apply Misc       CYCLOSET 0.8 MG Oral Tab   1       Allergies:  No Known Allergies    ROS:       PHYSICAL EXAM:     Base Eye Exam       Visual Acuity (Snellen - Linear)         Right Left    Dist cc 20/70 20/40    Near sc 20/20 20/25              Tonometry (Applanation, 2:26 PM)         Right Left    Pressure 14 16              Pachymetry (2/4/2019)         Right Left    Thickness 578/-2 596/-4              Pupils         Pupils APD    Right PERRL None    Left PERRL None              Visual Fields (Counting fingers)         Left Right     Full Full               Dilation       Both eyes: 1.0% Mydriacyl and 2.5% Bienvenido Synephrine @ 1:57 PM                  Strabismus Exam       Distance Near Near +3DS N Bifocals    AET  AET' 30       RET'             Slit Lamp and Fundus Exam       External Exam         Right Left    External Normal Normal              Slit Lamp Exam         Right Left    Lids/Lashes Blepharitis Blepharitis    Conjunctiva/Sclera Normal Normal    Cornea Clear Clear    Anterior Chamber Deep and quiet Deep and quiet    Iris Normal Normal    Lens 2+ Nuclear sclerosis 2+ Nuclear sclerosis    Vitreous Clear Clear              Fundus Exam         Right Left    Disc Peripapillary atrophy Peripapillary atrophy    C/D Ratio 0.3 0.4    Macula focal laser focal laser no mac edema    Vessels Mild dot, blot heme trace dot, blot heme    Periphery Normal Normal                  Refraction       Wearing Rx         Sphere Cylinder Axis    Right -7.25 +1.50 125    Left -5.50 Sphere       Type: old-Distance only              Manifest Refraction         Sphere Cylinder Wayne Dist VA Add Near VA    Right -6.50 +1.00 125 20/70+ +2.50 20/60    Left -5.50 Sphere  20/40 +2.50 20/30              Final Rx         Sphere Cylinder Axis Dist VA    Right -6.50 +1.00 125 20/70+    Left -5.50 Sphere  20/40      Type: Distance only                     ASSESSMENT/PLAN:     Diagnoses and Plan:     Age-related nuclear cataract of both eyes  Moderate cataracts. Patient will be changing to Dr. Harry for glaucoma care as well as cataract surgery when needed.    Alternating esotropia  Mostly RET. Longstanding, stable. Not interested in eye muscle surgery.    Blepharitis of upper and lower eyelids of both eyes  Patient instructed to use lid hygiene daily.  Apply baby shampoo or Ocusoft to warm washcloth and scrub eyelids gently with eyes closed, then rinse thoroughly.        Diabetic macular edema of both eyes (HCC)  Under the care of Dr. Brito. Has had focal laser and multiple Eylea injections both  eyes. Left macular edema resolved. Persistent Right macular edema as of last exam on 11/15/23 with Dr. Brito. Continue to follow with Dr. Brito as recommended.    Myopia of both eyes with astigmatism and presbyopia  New glasses. Distance only.    Primary open angle glaucoma (POAG) of both eyes  IOP good 14/16 adj 12/12  off Latanoprost for about 5 days.   OCT some  progressoion both eyes.  Follow with Glaucoma specialist Dr. Manny Harry going forward. Make appointment soon. Contact info given.  Continue Latanoprost both eyes at night    Orders Placed This Encounter   Procedures    OCT, Optic Nerve - OU - Both Eyes       Meds This Visit:  Requested Prescriptions     Signed Prescriptions Disp Refills    latanoprost 0.005 % Ophthalmic Solution 7.5 mL 3     Sig: INSTILL 1 DROP INTO BOTH   EYES EVERY NIGHT        Follow up instructions:  No follow-ups on file.    2/7/2024  Scribed by: Belkis De León MD

## 2024-07-09 ENCOUNTER — LAB ENCOUNTER (OUTPATIENT)
Dept: LAB | Facility: HOSPITAL | Age: 69
End: 2024-07-09
Attending: INTERNAL MEDICINE
Payer: MEDICARE

## 2024-07-09 DIAGNOSIS — E66.09 EXOGENOUS OBESITY: ICD-10-CM

## 2024-07-09 DIAGNOSIS — I10 ESSENTIAL HYPERTENSION, MALIGNANT: ICD-10-CM

## 2024-07-09 DIAGNOSIS — E11.39 DIABETIC OCULOPATHY (HCC): Primary | ICD-10-CM

## 2024-07-09 DIAGNOSIS — E55.9 VITAMIN D DEFICIENCY DISEASE: ICD-10-CM

## 2024-07-09 DIAGNOSIS — E11.65 INADEQUATELY CONTROLLED DIABETES MELLITUS (HCC): ICD-10-CM

## 2024-07-09 DIAGNOSIS — E13.51: ICD-10-CM

## 2024-07-09 DIAGNOSIS — E78.2 MIXED HYPERLIPIDEMIA: ICD-10-CM

## 2024-07-09 LAB
ALBUMIN SERPL-MCNC: 4.1 G/DL (ref 3.2–4.8)
ALBUMIN/GLOB SERPL: 1.5 {RATIO} (ref 1–2)
ALP LIVER SERPL-CCNC: 76 U/L
ALT SERPL-CCNC: 12 U/L
ANION GAP SERPL CALC-SCNC: 4 MMOL/L (ref 0–18)
AST SERPL-CCNC: 24 U/L (ref ?–34)
BASOPHILS # BLD AUTO: 0.03 X10(3) UL (ref 0–0.2)
BASOPHILS NFR BLD AUTO: 0.4 %
BILIRUB SERPL-MCNC: 0.6 MG/DL (ref 0.2–1.1)
BUN BLD-MCNC: 25 MG/DL (ref 9–23)
BUN/CREAT SERPL: 19.5 (ref 10–20)
CALCIUM BLD-MCNC: 9.4 MG/DL (ref 8.7–10.4)
CHLORIDE SERPL-SCNC: 106 MMOL/L (ref 98–112)
CO2 SERPL-SCNC: 31 MMOL/L (ref 21–32)
CREAT BLD-MCNC: 1.28 MG/DL
CREAT UR-SCNC: 282.8 MG/DL
DEPRECATED HBV CORE AB SER IA-ACNC: 139.5 NG/ML
DEPRECATED RDW RBC AUTO: 39.8 FL (ref 35.1–46.3)
EGFRCR SERPLBLD CKD-EPI 2021: 62 ML/MIN/1.73M2 (ref 60–?)
EOSINOPHIL # BLD AUTO: 0.07 X10(3) UL (ref 0–0.7)
EOSINOPHIL NFR BLD AUTO: 0.9 %
ERYTHROCYTE [DISTWIDTH] IN BLOOD BY AUTOMATED COUNT: 11.8 % (ref 11–15)
EST. AVERAGE GLUCOSE BLD GHB EST-MCNC: 192 MG/DL (ref 68–126)
FASTING STATUS PATIENT QL REPORTED: NO
GLOBULIN PLAS-MCNC: 2.8 G/DL (ref 2–3.5)
GLUCOSE BLD-MCNC: 189 MG/DL (ref 70–99)
HBA1C MFR BLD: 8.3 % (ref ?–5.7)
HCT VFR BLD AUTO: 38.7 %
HGB BLD-MCNC: 13.6 G/DL
IMM GRANULOCYTES # BLD AUTO: 0.03 X10(3) UL (ref 0–1)
IMM GRANULOCYTES NFR BLD: 0.4 %
LYMPHOCYTES # BLD AUTO: 1.3 X10(3) UL (ref 1–4)
LYMPHOCYTES NFR BLD AUTO: 15.8 %
MCH RBC QN AUTO: 32.5 PG (ref 26–34)
MCHC RBC AUTO-ENTMCNC: 35.1 G/DL (ref 31–37)
MCV RBC AUTO: 92.4 FL
MICROALBUMIN UR-MCNC: 3.4 MG/DL
MICROALBUMIN/CREAT 24H UR-RTO: 12 UG/MG (ref ?–30)
MONOCYTES # BLD AUTO: 0.56 X10(3) UL (ref 0.1–1)
MONOCYTES NFR BLD AUTO: 6.8 %
NEUTROPHILS # BLD AUTO: 6.22 X10 (3) UL (ref 1.5–7.7)
NEUTROPHILS # BLD AUTO: 6.22 X10(3) UL (ref 1.5–7.7)
NEUTROPHILS NFR BLD AUTO: 75.7 %
OSMOLALITY SERPL CALC.SUM OF ELEC: 301 MOSM/KG (ref 275–295)
PLATELET # BLD AUTO: 198 10(3)UL (ref 150–450)
POTASSIUM SERPL-SCNC: 5 MMOL/L (ref 3.5–5.1)
PROT SERPL-MCNC: 6.9 G/DL (ref 5.7–8.2)
RBC # BLD AUTO: 4.19 X10(6)UL
SODIUM SERPL-SCNC: 141 MMOL/L (ref 136–145)
T3FREE SERPL-MCNC: 2.72 PG/ML (ref 2.4–4.2)
T4 FREE SERPL-MCNC: 1 NG/DL (ref 0.8–1.7)
THYROGLOB SERPL-MCNC: <15 U/ML (ref ?–60)
TSI SER-ACNC: 2.62 MIU/ML (ref 0.55–4.78)
VIT D+METAB SERPL-MCNC: 28.8 NG/ML (ref 30–100)
WBC # BLD AUTO: 8.2 X10(3) UL (ref 4–11)

## 2024-07-09 PROCEDURE — 82728 ASSAY OF FERRITIN: CPT

## 2024-07-09 PROCEDURE — 80053 COMPREHEN METABOLIC PANEL: CPT

## 2024-07-09 PROCEDURE — 84439 ASSAY OF FREE THYROXINE: CPT

## 2024-07-09 PROCEDURE — 82306 VITAMIN D 25 HYDROXY: CPT

## 2024-07-09 PROCEDURE — 84681 ASSAY OF C-PEPTIDE: CPT

## 2024-07-09 PROCEDURE — 36415 COLL VENOUS BLD VENIPUNCTURE: CPT

## 2024-07-09 PROCEDURE — 86800 THYROGLOBULIN ANTIBODY: CPT

## 2024-07-09 PROCEDURE — 84481 FREE ASSAY (FT-3): CPT

## 2024-07-09 PROCEDURE — 86341 ISLET CELL ANTIBODY: CPT

## 2024-07-09 PROCEDURE — 85025 COMPLETE CBC W/AUTO DIFF WBC: CPT

## 2024-07-09 PROCEDURE — 82043 UR ALBUMIN QUANTITATIVE: CPT

## 2024-07-09 PROCEDURE — 84443 ASSAY THYROID STIM HORMONE: CPT

## 2024-07-09 PROCEDURE — 83036 HEMOGLOBIN GLYCOSYLATED A1C: CPT

## 2024-07-09 PROCEDURE — 82570 ASSAY OF URINE CREATININE: CPT

## 2024-07-10 LAB — C-PEPTIDE: 3.4 NG/ML

## 2024-07-11 LAB — GAD-65: <5 U/ML

## 2024-09-12 ENCOUNTER — HOSPITAL ENCOUNTER (OUTPATIENT)
Dept: ULTRASOUND IMAGING | Facility: HOSPITAL | Age: 69
Discharge: HOME | End: 2024-09-12
Attending: INTERNAL MEDICINE
Payer: MEDICARE

## 2024-09-12 DIAGNOSIS — I73.9 PVD (PERIPHERAL VASCULAR DISEASE) (HCC): ICD-10-CM

## 2024-09-12 PROCEDURE — 93923 UPR/LXTR ART STDY 3+ LVLS: CPT | Performed by: INTERNAL MEDICINE

## 2024-10-18 ENCOUNTER — TELEPHONE (OUTPATIENT)
Dept: INTERNAL MEDICINE CLINIC | Facility: CLINIC | Age: 69
End: 2024-10-18

## 2024-10-23 ENCOUNTER — MED REC SCAN ONLY (OUTPATIENT)
Dept: INTERNAL MEDICINE CLINIC | Facility: CLINIC | Age: 69
End: 2024-10-23

## 2024-10-24 RX ORDER — SODIUM CHLORIDE 9 MG/ML
INJECTION, SOLUTION INTRAVENOUS
Status: CANCELLED | OUTPATIENT
Start: 2024-10-25 | End: 2024-10-25

## 2024-10-24 RX ORDER — ASPIRIN 81 MG/1
324 TABLET, CHEWABLE ORAL ONCE
Status: CANCELLED | OUTPATIENT
Start: 2024-10-24 | End: 2024-10-24

## 2024-10-24 RX ORDER — CHLORHEXIDINE GLUCONATE 40 MG/ML
SOLUTION TOPICAL
Status: CANCELLED | OUTPATIENT
Start: 2024-10-25 | End: 2024-10-25

## 2024-11-11 RX ORDER — ACETAMINOPHEN 500 MG
500 TABLET ORAL EVERY 6 HOURS PRN
Status: ON HOLD | COMMUNITY
End: 2024-12-01

## 2024-11-11 RX ORDER — INSULIN DEGLUDEC 100 U/ML
28 INJECTION, SOLUTION SUBCUTANEOUS NIGHTLY
Status: ON HOLD | COMMUNITY

## 2024-11-12 ENCOUNTER — LAB ENCOUNTER (OUTPATIENT)
Dept: LAB | Facility: HOSPITAL | Age: 69
End: 2024-11-12
Attending: INTERNAL MEDICINE
Payer: MEDICARE

## 2024-11-12 ENCOUNTER — LAB ENCOUNTER (OUTPATIENT)
Dept: LAB | Facility: HOSPITAL | Age: 69
DRG: 628 | End: 2024-11-12
Attending: INTERNAL MEDICINE
Payer: MEDICARE

## 2024-11-12 ENCOUNTER — HOSPITAL ENCOUNTER (OUTPATIENT)
Dept: GENERAL RADIOLOGY | Facility: HOSPITAL | Age: 69
Discharge: HOME | End: 2024-11-12
Attending: INTERNAL MEDICINE
Payer: MEDICARE

## 2024-11-12 ENCOUNTER — HOSPITAL ENCOUNTER (OUTPATIENT)
Dept: GENERAL RADIOLOGY | Facility: HOSPITAL | Age: 69
Discharge: HOME | DRG: 628 | End: 2024-11-12
Attending: INTERNAL MEDICINE
Payer: MEDICARE

## 2024-11-12 DIAGNOSIS — Z01.818 PRE-OP TESTING: ICD-10-CM

## 2024-11-12 LAB
ANION GAP SERPL CALC-SCNC: 3 MMOL/L (ref 0–18)
BUN BLD-MCNC: 24 MG/DL (ref 9–23)
BUN/CREAT SERPL: 20 (ref 10–20)
CALCIUM BLD-MCNC: 9.8 MG/DL (ref 8.7–10.4)
CHLORIDE SERPL-SCNC: 100 MMOL/L (ref 98–112)
CO2 SERPL-SCNC: 35 MMOL/L (ref 21–32)
CREAT BLD-MCNC: 1.2 MG/DL
DEPRECATED RDW RBC AUTO: 44.9 FL (ref 35.1–46.3)
EGFRCR SERPLBLD CKD-EPI 2021: 67 ML/MIN/1.73M2 (ref 60–?)
ERYTHROCYTE [DISTWIDTH] IN BLOOD BY AUTOMATED COUNT: 12 % (ref 11–15)
FASTING STATUS PATIENT QL REPORTED: NO
GLUCOSE BLD-MCNC: 379 MG/DL (ref 70–99)
HCT VFR BLD AUTO: 34.6 %
HGB BLD-MCNC: 11.1 G/DL
MCH RBC QN AUTO: 32.4 PG (ref 26–34)
MCHC RBC AUTO-ENTMCNC: 32.1 G/DL (ref 31–37)
MCV RBC AUTO: 100.9 FL
OSMOLALITY SERPL CALC.SUM OF ELEC: 306 MOSM/KG (ref 275–295)
PLATELET # BLD AUTO: 267 10(3)UL (ref 150–450)
POTASSIUM SERPL-SCNC: 4.4 MMOL/L (ref 3.5–5.1)
RBC # BLD AUTO: 3.43 X10(6)UL
SODIUM SERPL-SCNC: 138 MMOL/L (ref 136–145)
WBC # BLD AUTO: 11.2 X10(3) UL (ref 4–11)

## 2024-11-12 PROCEDURE — 85027 COMPLETE CBC AUTOMATED: CPT

## 2024-11-12 PROCEDURE — 71046 X-RAY EXAM CHEST 2 VIEWS: CPT | Performed by: INTERNAL MEDICINE

## 2024-11-12 PROCEDURE — 80048 BASIC METABOLIC PNL TOTAL CA: CPT

## 2024-11-12 PROCEDURE — 36415 COLL VENOUS BLD VENIPUNCTURE: CPT

## 2024-11-14 ENCOUNTER — HOSPITAL ENCOUNTER (INPATIENT)
Dept: INTERVENTIONAL RADIOLOGY/VASCULAR | Facility: HOSPITAL | Age: 69
LOS: 7 days | Discharge: HOME | DRG: 628 | End: 2024-11-21
Attending: INTERNAL MEDICINE | Admitting: INTERNAL MEDICINE
Payer: MEDICARE

## 2024-11-14 ENCOUNTER — HOSPITAL ENCOUNTER (INPATIENT)
Dept: INTERVENTIONAL RADIOLOGY/VASCULAR | Facility: HOSPITAL | Age: 69
LOS: 7 days | Discharge: HOME | End: 2024-11-21
Attending: INTERNAL MEDICINE | Admitting: INTERNAL MEDICINE
Payer: MEDICARE

## 2024-11-14 DIAGNOSIS — R94.39 ABNORMAL NUCLEAR STRESS TEST: ICD-10-CM

## 2024-11-14 DIAGNOSIS — Z01.818 PRE-OP TESTING: Primary | ICD-10-CM

## 2024-11-14 DIAGNOSIS — R93.1 ABNORMAL ECHOCARDIOGRAM: ICD-10-CM

## 2024-11-14 PROBLEM — I25.10 CAD (CORONARY ARTERY DISEASE): Status: ACTIVE | Noted: 2024-11-14

## 2024-11-14 PROBLEM — I25.10 CAD (CORONARY ARTERY DISEASE): Status: ACTIVE | Noted: 2024-01-01

## 2024-11-14 LAB
EST. AVERAGE GLUCOSE BLD GHB EST-MCNC: 209 MG/DL (ref 68–126)
GLUCOSE BLDC GLUCOMTR-MCNC: 266 MG/DL (ref 70–99)
GLUCOSE BLDC GLUCOMTR-MCNC: 283 MG/DL (ref 70–99)
GLUCOSE BLDC GLUCOMTR-MCNC: 312 MG/DL (ref 70–99)
GLUCOSE BLDC GLUCOMTR-MCNC: 324 MG/DL (ref 70–99)
HBA1C MFR BLD: 8.9 % (ref ?–5.7)

## 2024-11-14 PROCEDURE — B2151ZZ FLUOROSCOPY OF LEFT HEART USING LOW OSMOLAR CONTRAST: ICD-10-PCS | Performed by: INTERNAL MEDICINE

## 2024-11-14 PROCEDURE — 4A023N8 MEASUREMENT OF CARDIAC SAMPLING AND PRESSURE, BILATERAL, PERCUTANEOUS APPROACH: ICD-10-PCS | Performed by: INTERNAL MEDICINE

## 2024-11-14 PROCEDURE — 99223 1ST HOSP IP/OBS HIGH 75: CPT | Performed by: HOSPITALIST

## 2024-11-14 PROCEDURE — B2111ZZ FLUOROSCOPY OF MULTIPLE CORONARY ARTERIES USING LOW OSMOLAR CONTRAST: ICD-10-PCS | Performed by: INTERNAL MEDICINE

## 2024-11-14 RX ORDER — METOCLOPRAMIDE HYDROCHLORIDE 5 MG/ML
10 INJECTION INTRAMUSCULAR; INTRAVENOUS EVERY 8 HOURS PRN
Status: DISCONTINUED | OUTPATIENT
Start: 2024-11-14 | End: 2024-11-21

## 2024-11-14 RX ORDER — LATANOPROST 50 UG/ML
1 SOLUTION/ DROPS OPHTHALMIC NIGHTLY
Status: DISCONTINUED | OUTPATIENT
Start: 2024-11-14 | End: 2024-11-21

## 2024-11-14 RX ORDER — DEXTROSE MONOHYDRATE 25 G/50ML
50 INJECTION, SOLUTION INTRAVENOUS
Status: DISCONTINUED | OUTPATIENT
Start: 2024-11-14 | End: 2024-11-21

## 2024-11-14 RX ORDER — VERAPAMIL HYDROCHLORIDE 2.5 MG/ML
INJECTION, SOLUTION INTRAVENOUS
Status: COMPLETED
Start: 2024-11-14 | End: 2024-11-14

## 2024-11-14 RX ORDER — MIDAZOLAM HYDROCHLORIDE 1 MG/ML
INJECTION INTRAMUSCULAR; INTRAVENOUS
Status: COMPLETED
Start: 2024-11-14 | End: 2024-11-14

## 2024-11-14 RX ORDER — FUROSEMIDE 10 MG/ML
40 INJECTION INTRAMUSCULAR; INTRAVENOUS
Status: DISCONTINUED | OUTPATIENT
Start: 2024-11-14 | End: 2024-11-16

## 2024-11-14 RX ORDER — CHLORHEXIDINE GLUCONATE 40 MG/ML
SOLUTION TOPICAL
Status: COMPLETED | OUTPATIENT
Start: 2024-11-14 | End: 2024-11-14

## 2024-11-14 RX ORDER — ACETAMINOPHEN 500 MG
500 TABLET ORAL EVERY 4 HOURS PRN
Status: DISCONTINUED | OUTPATIENT
Start: 2024-11-14 | End: 2024-11-21

## 2024-11-14 RX ORDER — IOPAMIDOL 612 MG/ML
92 INJECTION, SOLUTION INTRAVASCULAR
Status: COMPLETED | OUTPATIENT
Start: 2024-11-14 | End: 2024-11-14

## 2024-11-14 RX ORDER — ATORVASTATIN CALCIUM 80 MG/1
80 TABLET, FILM COATED ORAL NIGHTLY
Qty: 30 TABLET | Refills: 5 | Status: SHIPPED | OUTPATIENT
Start: 2024-11-14 | End: 2024-11-21

## 2024-11-14 RX ORDER — VANCOMYCIN HYDROCHLORIDE
15 EVERY 24 HOURS
Status: DISCONTINUED | OUTPATIENT
Start: 2024-11-14 | End: 2024-11-18 | Stop reason: DRUGHIGH

## 2024-11-14 RX ORDER — METOPROLOL TARTRATE 25 MG/1
25 TABLET, FILM COATED ORAL 2 TIMES DAILY
Qty: 60 TABLET | Refills: 5 | Status: SHIPPED | OUTPATIENT
Start: 2024-11-14 | End: 2024-11-21

## 2024-11-14 RX ORDER — HEPARIN SODIUM 5000 [USP'U]/ML
5000 INJECTION, SOLUTION INTRAVENOUS; SUBCUTANEOUS EVERY 12 HOURS SCHEDULED
Status: DISCONTINUED | OUTPATIENT
Start: 2024-11-14 | End: 2024-11-21

## 2024-11-14 RX ORDER — ASPIRIN 81 MG/1
324 TABLET, CHEWABLE ORAL ONCE
Status: DISCONTINUED | OUTPATIENT
Start: 2024-11-14 | End: 2024-11-16

## 2024-11-14 RX ORDER — NICOTINE POLACRILEX 4 MG
15 LOZENGE BUCCAL
Status: DISCONTINUED | OUTPATIENT
Start: 2024-11-14 | End: 2024-11-21

## 2024-11-14 RX ORDER — ONDANSETRON 2 MG/ML
4 INJECTION INTRAMUSCULAR; INTRAVENOUS EVERY 6 HOURS PRN
Status: DISCONTINUED | OUTPATIENT
Start: 2024-11-14 | End: 2024-11-21

## 2024-11-14 RX ORDER — ASPIRIN 81 MG/1
81 TABLET ORAL DAILY
Qty: 30 TABLET | Refills: 5 | Status: ON HOLD | OUTPATIENT
Start: 2024-11-14

## 2024-11-14 RX ORDER — SODIUM CHLORIDE 9 MG/ML
3 INJECTION, SOLUTION INTRAVENOUS
Status: COMPLETED | OUTPATIENT
Start: 2024-11-14 | End: 2024-11-14

## 2024-11-14 RX ORDER — LIDOCAINE HYDROCHLORIDE 20 MG/ML
INJECTION, SOLUTION EPIDURAL; INFILTRATION; INTRACAUDAL; PERINEURAL
Status: COMPLETED
Start: 2024-11-14 | End: 2024-11-14

## 2024-11-14 RX ORDER — INSULIN DEGLUDEC 100 U/ML
28 INJECTION, SOLUTION SUBCUTANEOUS NIGHTLY
Status: DISCONTINUED | OUTPATIENT
Start: 2024-11-14 | End: 2024-11-17

## 2024-11-14 RX ORDER — NITROGLYCERIN 20 MG/100ML
INJECTION INTRAVENOUS
Status: COMPLETED
Start: 2024-11-14 | End: 2024-11-14

## 2024-11-14 RX ORDER — TEMAZEPAM 15 MG/1
15 CAPSULE ORAL NIGHTLY PRN
Status: DISCONTINUED | OUTPATIENT
Start: 2024-11-14 | End: 2024-11-21

## 2024-11-14 RX ORDER — HEPARIN SODIUM 1000 [USP'U]/ML
INJECTION, SOLUTION INTRAVENOUS; SUBCUTANEOUS
Status: COMPLETED
Start: 2024-11-14 | End: 2024-11-14

## 2024-11-14 RX ORDER — DOXYCYCLINE 100 MG/1
100 CAPSULE ORAL 2 TIMES DAILY
COMMUNITY
Start: 2024-11-13 | End: 2024-11-21

## 2024-11-14 RX ORDER — NICOTINE POLACRILEX 4 MG
30 LOZENGE BUCCAL
Status: DISCONTINUED | OUTPATIENT
Start: 2024-11-14 | End: 2024-11-21

## 2024-11-14 RX ORDER — ASPIRIN 81 MG/1
81 TABLET ORAL DAILY
Status: DISCONTINUED | OUTPATIENT
Start: 2024-11-15 | End: 2024-11-21

## 2024-11-14 RX ADMIN — LATANOPROST 1 DROP: 50 SOLUTION/ DROPS OPHTHALMIC at 21:45:00

## 2024-11-14 RX ADMIN — VANCOMYCIN HYDROCHLORIDE 1500 MG: at 15:38:00

## 2024-11-14 RX ADMIN — SODIUM CHLORIDE 3 ML/KG/HR: 9 INJECTION, SOLUTION INTRAVENOUS at 06:45:00

## 2024-11-14 RX ADMIN — IOPAMIDOL 92 ML: 612 INJECTION, SOLUTION INTRAVASCULAR at 08:43:00

## 2024-11-14 RX ADMIN — HEPARIN SODIUM 5000 UNITS: 5000 INJECTION, SOLUTION INTRAVENOUS; SUBCUTANEOUS at 21:45:00

## 2024-11-14 RX ADMIN — INSULIN DEGLUDEC 28 UNITS: 100 INJECTION, SOLUTION SUBCUTANEOUS at 21:45:00

## 2024-11-14 RX ADMIN — CHLORHEXIDINE GLUCONATE: 40 SOLUTION TOPICAL at 06:45:00

## 2024-11-14 RX ADMIN — FUROSEMIDE 40 MG: 10 INJECTION INTRAMUSCULAR; INTRAVENOUS at 17:30:00

## 2024-11-14 NOTE — H&P
[facility]  History and Physical    Davin Hassan Patient Status:  Outpatient    1955 MRN [id number]   Location [facility] INTERVENTIONAL SUITES Attending Jorge Barone MD   Hosp Day # 0 PCP Susana Magallon MD     Reason for Admission:  Elective for coronary angiogram    History of Present Illness:  Davin Hassan is a a(n) 68 year old male who presents with elective for coronary angiogram after having abnormal stress test.  His dyspnea on exertion.    History:  Past Medical History:    Alternating esotropia    Alternating esotropia    BDR (background diabetic retinopathy) ([facility])    under the care of Dr. Brito- see progress note    Colon adenomas    x4    Diabetic macular edema of left eye ([facility])    S/P Eylea injection OS x 5 with Dr. Brito    Glaucoma suspect of both eyes    Primary open angle glaucoma (POAG) of left eye, mild stage    19 start Latanoprost qhs OS due to thinning of the optic nerve on OCT    Type II or unspecified type diabetes mellitus without mention of complication, not stated as uncontrolled     Past Surgical History:   Procedure Laterality Date    Colonoscopy  2008    Colonoscopy  2023    Strabismus surgery Bilateral      Family History   Problem Relation Age of Onset    Diabetes Father     Glaucoma Father     Macular degeneration Father     Breast Cancer Mother     Heart Disorder Brother       reports that he has never smoked. He has never used smokeless tobacco. He reports current alcohol use of about 1.0 standard drink of alcohol per week. He reports that he does not use drugs.    Allergies:  Allergies[1]    Medications:    Current Facility-Administered Medications:     aspirin chewable tab 324 mg, 324 mg, Oral, Once    Review of Systems:  A comprehensive review of systems was negative if not otherwise mention in above HPI.    /74 (BP Location: Right arm)   Pulse 62   Temp (!) 96 °F (35.6 °C) (Temporal)    Resp 18   Ht 6' 1\" (1.854 m)   Wt 230 lb (104.3 kg)   SpO2 95%   BMI 30.34 kg/m²   Temp (24hrs), Av °F (35.6 °C), Min:96 °F (35.6 °C), Max:96 °F (35.6 °C)     No intake or output data in the 24 hours ending 24 0759  Wt Readings from Last 3 Encounters:   24 230 lb (104.3 kg)   23 224 lb (101.6 kg)   22 230 lb 6.4 oz (104.5 kg)       Physical Exam:   General: Alert and oriented x 3. No apparent distress. No respiratory or constitutional distress.  HEENT: Normocephalic, anicteric sclera, neck supple.  Neck: No JVD, carotids 2+, no bruits.  Cardiac: Regular rate and rhythm. S1, S2 normal. No murmur, pericardial rub, S3.  Lungs: Clear without wheezes, rales, rhonchi or dullness.  Normal excursions and effort.  Abdomen: Soft, non-tender. BS-present.  Extremities: Without clubbing, cyanosis or edema.  Peripheral pulses are 2+.  Neurologic: Alert and oriented, normal affect.  Skin: Warm and dry.     Laboratory Data:  Lab Results   Component Value Date    PGLU 312 2024       Imaging:  EKG, blood test and chest x-ray reviewed.  Stress test reviewed.  Impression:  Severely abnormal stress test  Severe LV dysfunction  Hypertension  Diabetes  Hyperlipidemia    Recommendations:  Patient was consented for left heart cardiac catheterization. The risks and benefits of the procedure were explained to the patient. 1-2% risk of coronary angiography, possible angioplasty, include, but are not limited to stroke, death, heart attack, coronary dissection requiring emergent CABG, hematoma, bleeding, allergic reaction to medications, vascular damage requiring surgical repair, kidney failure requiring dialysis and others. Patient wishes to proceed.      Thank you for allowing me to participate in the care of your patient.    Jorge Barone MD  2024  7:59 AM             [1] No Known Allergies

## 2024-11-14 NOTE — DISCHARGE INSTRUCTIONS
TRANSRADIAL ANGIOGRAM DISCHARGE INSTRUCTIONS AND HOME CARE    Activity    DO NOT drive after the procedure.  You may resume driving late the following day according to the nurse or physician's instructions  Plan on resting and relaxing tonight and tomorrow  Resume your normal activity after 48 hours, or as instructed by your physician  Avoid drinking alcohol for the next 24 hours  Avoid wrist flexion, extension, and fine motor activities (i.e. texting, typing, using computer mouse, etc.) for 24 hours.  The armboard will help remind you not to do these motions.   Do not lift or pull anything heavier than 5 to 8 pounds and avoid pushing up with the affected hand for 1 week    What is Normal?    A small lump at the procedure site associated with mild tenderness when touched  The procedure site may be bruised or discolored  There may be a small amount of drainage on the bandage    Special Instructions     Drink plenty of fluids during the next 24 hours to \"flush\" the contrast from your system  Keep the bandage clean and dry  After 24 hours, you remove the bandage and armboard.  Remove the dressing and armboard tomorrow anytime after 9AM.  Do not put ointment, powders, or creams to site.   You can shower after removing the bandage, and wash the procedure site gently with soap and water  DO NOT submerge the procedure site for 1 week (no bath tubs, pools or washing dishes)  If you choose to wear a bandage for a few days, make sure it remains clean and dry and that it is changed daily  For local swelling: apply ice  Bleeding can occur at the procedure site - both on the outside of the skin and/or beneath the surface of the skin        If bleeding occurs:   Elevate hand above heart and apply pressure to the procedure site with 2-3 fingers, as instructed by the nurse.  Hold pressure for 20 minutes and the bleeding should stop.  Notify your physician of the occurrence.  If the bleeding does not stop, call 911 and continue to  apply pressure    When to contact physician: Call Dr. Barone at [phone] right away if you experience     Increased swelling or a large lump, pain or bleeding at the site that is not relieved by applying ice or pressure  Signs of infection: Redness, warmth, drainage at the site, chills, or temperature of 100.5 or greater  Changes in sensation, numbness, or tingling of affected hand      Other    You may resume your present diet, unless otherwise specified by your physician.    You may resume all of your medications as prescribed, unless otherwise directed by your physician.  A list of your medications was provided to you at discharge.  Gradually resume your previous aerobic exercise schedule as directed by your physician.      If you have any question or concern, please call the on-call nurse at [phone]

## 2024-11-14 NOTE — H&P
[facility]    PATIENT'S NAME: OMA MEDINA   ATTENDING PHYSICIAN: Raza Ornelas MD   PATIENT ACCOUNT#:   [id number]    LOCATION:  [address] [place] [address]  MEDICAL RECORD #:   [id number]       YOB: 1955  ADMISSION DATE:       11/14/2024    HISTORY AND PHYSICAL EXAMINATION    CHIEF COMPLAINT:  Multivessel coronary artery disease, left lower extremity cellulitis, and fluid overload status.    HISTORY OF PRESENT ILLNESS:  Patient is a 68-year-old  male who was on a trip with his family to [place], last week.  He noted that he has orthopnea, dyspnea on exertion, and developed cellulitis in his left foot and distal left leg.  He was seen in a hospital over there and started on antibiotics and diuretics.  Upon coming back to the [place], he was evaluated by Cardiology and had a stress test which was abnormal.  Today, he had a cardiac angiogram done by his cardiologist, Dr. Barone, which showed reportedly multivessel coronary artery disease.  Patient will be admitted to the hospital for further cardiovascular surgery evaluation.     PAST MEDICAL HISTORY:  Insulin-dependent diabetes mellitus type 2, hyperlipidemia, peripheral diabetic neuropathy, glaucoma, and peripheral arterial disease.    PAST SURGICAL HISTORY:  None.    MEDICATIONS:  Please see medication reconciliation list.     ALLERGIES:  No known drug allergies.    FAMILY HISTORY:  Mother had breast cancer.  Father had diabetes mellitus type 2.    SOCIAL HISTORY:  No tobacco or drug use.  Social alcohol.  Lives with his family.  Independent in his basic activities of daily living.     REVIEW OF SYSTEMS:  Patient said he had erythema in his left leg and left foot since he was walking around in [place], and he was started on antibiotics with some improvement.  Also has been noticing dyspnea on exertion, orthopnea, and some chest pressure with physical activity in the last 2 to 3 weeks.  He denies any fever or chills.   He does have a callus that broke off on the fifth toe on the left side recently but now no drainage.  Other 12-point review of systems is negative.       PHYSICAL EXAMINATION:    GENERAL:  Alert and oriented to time, place and person.  No acute distress.   VITAL SIGNS:  Temperature 96.0, pulse 60, respiratory rate 22, blood pressure 106/57, pulse ox 97% on room air.  HEENT:  Atraumatic.  Oropharynx clear.  Dry mucous membranes.    NECK:  Supple.  No lymphadenopathy.  Trachea midline.  Full range of motion.   LUNGS:  Clear to auscultation bilaterally.  Normal respiratory effort.    HEART:  Regular rate and rhythm.  S1 and S2 auscultated.  No murmur.    ABDOMEN:  Soft, nondistended.  No tenderness.  Positive bowel sounds.   EXTREMITIES:  There is +1 edema, both legs.  Left foot and left leg erythema.  No open wounds.  Left fifth toe with callus formation on the dorsum and lateral aspect with no drainage.  NEUROLOGIC:  Decreased sensation to light touch in both feet.  Otherwise, no focal findings.    ASSESSMENT:    1.   Multivessel coronary artery disease, to be evaluated by Cardiovascular Surgery for coronary artery bypass graft surgery.  2.   Fluid overload status and possible heart failure.    3.   Left lower extremity cellulitis.  Rule out left fifth toe osteomyelitis.  4.   Insulin-dependent diabetes mellitus type 2.    PLAN:  Patient will be started on IV Lasix.  IV vancomycin.  Obtain MRI scan of the left foot.  Cardiology, cardiovascular surgery, and infectious disease consults.  Monitor Accu-Cheks.  Further recommendations to follow.     Dictated By Raza Ornelas MD  d: 11/14/2024 14:06:44  t: 11/14/2024 14:38:36  [facility] [id number]  /

## 2024-11-14 NOTE — IVS NOTE
Hand-Off     Procedure hand off report given to Pastora PENNINGTON.   Pt's vital signs are stable.   Right radial and right brachial procedural access sites are dry and intact with no signs or symptoms of bleeding or hematoma. Gauze + tegaderm with armboard in place to right radial site.  Right brachial site with gauze + tegaderm in place.   Dr. Barone spoke with patient/family post procedure.   Radha ENG from cardiothoracic surgery to see patient.   Patient transferred with family and belongings.    Bedrest and activity status reviewed.

## 2024-11-14 NOTE — PLAN OF CARE
Decided to admit the patient to the hospital because of cellulitis of the left leg and need for surgical revascularization.  IV antibiotics, MRI of the foot to rule out osteomyelitis, consultation with ID and CT surgery.  Diabetes control.  Depending on comorbid conditions, will determine appropriate timing of revascularization.

## 2024-11-14 NOTE — PROCEDURES
Cardiologist: Jorge Barone MD  Primary Proceduralist: Jorge Barone MD  Procedure Performed: LHC, RHC, COR, and LV  Date of Procedure: 11/14/2024     Pre procedure diagnosis: Abnormal stress test, LV dysfunction  Post procedure diagnosis: Severe triple-vessel disease    Summary of findings: Severe triple-vessel disease with mild to moderate LV dysfunction.  No significant valvular heart disease.  Normal PA pressures with normal pulmonary capillary wedge pressure.  Normal cardiac index.      Post procedure findings:    Hemodynamic Data    RA 3 mm of Hg  RV 38/14 mm of Hg  PA 31/5 mean 17 mm of Hg  PCW 12 mm of Hg    CO 8.2 L/min by Rolo/ CI 3.6 (L/min)/m2   dynes/ PVR 48 dynes    Coronary anatomy    1) Left Ventriculography at 30 degrees ISAACS: LVEF: 45% with mid anterior and apical hypokinesis  2) Hemodynamics: LVEDP: 14 mmHg, no gradient across aortic valve    3) Coronaries:  Left main is long and free of obstructive disease  LAD is diffusely diseased with subtotal occlusion in the midsegment at the origin of a large diagonal branch, supplies 2 diagonals.  First diagonal is subtotally occluded in its medium to large vessel  Ramus: Medium to large vessel with 80 to 90% stenosis in the proximal segment  Cx subtotally occluded and small to medium vessel, supplies 1 OM branches which are patent  RCA is dominant and occluded in the midsegment with right to right faint collaterals, supplies PDA and PL      Recommendations:  Surgical consultation for revascularization    Summary of Case: After written informed consent was obtained from the patient, patient was brought to the cardiac catheterization laboratory.  Patient was prepped and draped in the usual sterile fashion. Lidocaine 1% was used to infiltrate the right wrist for local anesthesia and a 6 Martiniquais introducer sheath was inserted into the right radial artery.  Right brachial vein accessed.  5 FR sheath placed.  Nebo advanced to RA and pressures recorded in  RA, RV, PA and wedge positions under fluoroscopic and hemodynamic guidance.        Selective coronary angiography performed with JR4 catheter for RCA and JL4 catheter for LCA.  Angiography performed in standard projections.        Specimen sent to: No specimen collected  Estimated blood loss: 10 cc  Closure: TR band for artery and manual for brachial      IV was maintained by RN and moderate conscious sedation of versed and fentanyl was given.  Patient was assessed and monitoring of oxygen, heart rate and blood pressure by nurse and myself during the exam 25 minutes.      Jorge Barone MD  11/14/24

## 2024-11-14 NOTE — CONSULTS
[facility]  part of [facility]  [facility]   Report of Consultation    Davin Hassan Patient Status:  Observation    1955 MRN [id number]   Location [facility] 3W/SW Attending Raza Ornelas MD   Hosp Day # 0 PCP Susana Magallon MD     Date of Admission:  2024  Date of Consult:  2024    Reason for Consultation:  Multivessel coronary artery disease    History of Present Illness:  Davin Hassan is a a(n) 68 year old male.  Presented today for elective angiogram in the setting of an abnormal stress test and found to have multivessel coronary artery disease.  We are asked evaluate for revascularization options.  Patient was recently on vacation in [place] when he was admitted to a hospital there due to a lower extremity wound and cellulitis.  He is current undergoing treatment for this with antibiotic.  Given ongoing infection, patient was admitted after his angiogram for expedited workup and evaluation of his foot infection.  He denies any chest pain, back pain, nausea vomiting, shortness of breath or dyspnea on exertion.    History:  Past Medical History:    Alternating esotropia    Alternating esotropia    BDR (background diabetic retinopathy) ([facility])    under the care of Dr. Brito- see progress note    Colon adenomas    x4    Diabetic macular edema of left eye ([facility])    S/P Eylea injection OS x 5 with Dr. Brito    Glaucoma suspect of both eyes    Primary open angle glaucoma (POAG) of left eye, mild stage    19 start Latanoprost qhs OS due to thinning of the optic nerve on OCT    Type II or unspecified type diabetes mellitus without mention of complication, not stated as uncontrolled     Past Surgical History:   Procedure Laterality Date    Colonoscopy  2008    Colonoscopy  2023    Strabismus surgery Bilateral      Family History   Problem Relation Age of Onset    Diabetes Father     Glaucoma Father      Macular degeneration Father     Breast Cancer Mother     Heart Disorder Brother       reports that he has never smoked. He has never used smokeless tobacco. He reports current alcohol use of about 1.0 standard drink of alcohol per week. He reports that he does not use drugs.    Allergies:  Allergies[1]    Medications:    Current Facility-Administered Medications:     aspirin chewable tab 324 mg, 324 mg, Oral, Once    insulin degludec (Tresiba) 100 units/mL flextouch 28 Units, 28 Units, Subcutaneous, Nightly    latanoprost (Xalatan) 0.005 % ophthalmic solution 1 drop, 1 drop, Both Eyes, Nightly    glucose (Dex4) 15 GM/59ML oral liquid 15 g, 15 g, Oral, Q15 Min PRN **OR** glucose (Glutose) 40% oral gel 15 g, 15 g, Oral, Q15 Min PRN **OR** glucose-vitamin C (Dex-4) chewable tab 4 tablet, 4 tablet, Oral, Q15 Min PRN **OR** dextrose 50% injection 50 mL, 50 mL, Intravenous, Q15 Min PRN **OR** glucose (Dex4) 15 GM/59ML oral liquid 30 g, 30 g, Oral, Q15 Min PRN **OR** glucose (Glutose) 40% oral gel 30 g, 30 g, Oral, Q15 Min PRN **OR** glucose-vitamin C (Dex-4) chewable tab 8 tablet, 8 tablet, Oral, Q15 Min PRN    insulin aspart (NovoLOG) 100 Units/mL FlexPen 1-7 Units, 1-7 Units, Subcutaneous, TID CC    vancomycin (Vancocin) 1.5 g in sodium chloride 0.9% 250mL IVPB premix, 15 mg/kg, Intravenous, Q24H    heparin (Porcine) 5000 UNIT/ML injection 5,000 Units, 5,000 Units, Subcutaneous, 2 times per day    acetaminophen (Tylenol Extra Strength) tab 500 mg, 500 mg, Oral, Q4H PRN    ondansetron (Zofran) 4 MG/2ML injection 4 mg, 4 mg, Intravenous, Q6H PRN    metoclopramide (Reglan) 5 mg/mL injection 10 mg, 10 mg, Intravenous, Q8H PRN    temazepam (Restoril) cap 15 mg, 15 mg, Oral, Nightly PRN    furosemide (Lasix) 10 mg/mL injection 40 mg, 40 mg, Intravenous, BID (Diuretic)    [START ON 11/15/2024] aspirin DR tab 81 mg, 81 mg, Oral, Daily    Review of Systems:  Pertinent items are noted in HPI.    Physical Exam:  Blood pressure  118/59, pulse 62, temperature 98.3 °F (36.8 °C), temperature source Oral, resp. rate 22, height 6' 1\" (1.854 m), weight 229 lb (103.9 kg), SpO2 94%.    GENERAL: No acute distress.  VITAL SIGNS: See above.  HEENT: Trachea midline.  No jugular venous distention.  No carotid bruit.  CHEST: Chest wall is nontender.  HEART: Regular rate and rhythm without murmurs.  LUNGS: Clear to auscultation bilaterally.  ABDOMEN: Soft, nontender nondistended.  VASCULAR: Palpable radial artery pulses 2+ bilateral.  SKIN: No rash, no excessive bruising, petechiae, or purpura.  NEUROLOGIC: Cranial nerves II-XII intact without motor/sensory deficit.      Laboratory Data:  Recent Labs   Lab 11/12/24  1301   RBC 3.43*   HGB 11.1*   HCT 34.6*   .9*   MCH 32.4   MCHC 32.1   RDW 12.0   WBC 11.2*   .0       Recent Labs   Lab 11/12/24  1301   *   BUN 24*   CREATSERUM 1.20   EGFRCR 67   CA 9.8      K 4.4      CO2 35.0*       Cardiologist: Jogre Barone MD  Primary Proceduralist: Jorge Barone MD  Procedure Performed: LHC, RHC, COR, and LV  Date of Procedure: 11/14/2024      Pre procedure diagnosis: Abnormal stress test, LV dysfunction  Post procedure diagnosis: Severe triple-vessel disease     Summary of findings: Severe triple-vessel disease with mild to moderate LV dysfunction.  No significant valvular heart disease.  Normal PA pressures with normal pulmonary capillary wedge pressure.  Normal cardiac index.        Post procedure findings:     Hemodynamic Data     RA 3 mm of Hg  RV 38/14 mm of Hg  PA 31/5 mean 17 mm of Hg  PCW 12 mm of Hg     CO 8.2 L/min by Rolo/ CI 3.6 (L/min)/m2   dynes/ PVR 48 dynes     Coronary anatomy     1) Left Ventriculography at 30 degrees ISAACS: LVEF: 45% with mid anterior and apical hypokinesis  2) Hemodynamics: LVEDP: 14 mmHg, no gradient across aortic valve     3) Coronaries:  Left main is long and free of obstructive disease  LAD is diffusely diseased with subtotal occlusion  in the midsegment at the origin of a large diagonal branch, supplies 2 diagonals.  First diagonal is subtotally occluded in its medium to large vessel  Ramus: Medium to large vessel with 80 to 90% stenosis in the proximal segment  Cx subtotally occluded and small to medium vessel, supplies 1 OM branches which are patent  RCA is dominant and occluded in the midsegment with right to right faint collaterals, supplies PDA and PL          Impression and Plan:  Patient Active Problem List   Diagnosis    Alternating esotropia    Blepharitis of upper and lower eyelids of both eyes    Myopia of both eyes with astigmatism and presbyopia    Age-related nuclear cataract of both eyes    BDR (background diabetic retinopathy) (HCC)    Diabetic macular edema of both eyes (HCC)    Primary open angle glaucoma (POAG) of both eyes    CAD (coronary artery disease)       68-year-old male with multivessel coronary artery disease, left leg cellulitis.    I believe patient will ultimately benefit from surgical vascularization however he is not a candidate for operative intervention in the setting of active cellulitis and possible osteomyelitis.  He will be evaluated by infectious disease, potentially vascular surgery and potentially podiatry.  Will follow closely to see how his hospital course goes regarding his cellulitis and we will plan for surgery appropriately.  Discussed with patient, in agreement with plan.  Will follow him while he is in the hospital, will obtain workup for surgery or to his surgery date which is yet to be determined.  Questions answered to his satisfaction.    Francois Flores MD  Cardiothoracic Surgery  [facility]     Time spent on counseling/coordination of care:  00623- 60 min    Total time spent with patient:  45 Minutes    Francois Flores MD  11/14/2024  5:24 PM       [1] No Known Allergies

## 2024-11-15 PROBLEM — Z01.818 PRE-OP TESTING: Status: ACTIVE | Noted: 2024-01-01

## 2024-11-15 PROBLEM — Z01.818 PRE-OP TESTING: Status: ACTIVE | Noted: 2024-11-15

## 2024-11-15 LAB
ANION GAP SERPL CALC-SCNC: 2 MMOL/L (ref 0–18)
BASOPHILS # BLD AUTO: 0.04 X10(3) UL (ref 0–0.2)
BASOPHILS NFR BLD AUTO: 0.4 %
BUN BLD-MCNC: 22 MG/DL (ref 9–23)
BUN/CREAT SERPL: 21.6 (ref 10–20)
CALCIUM BLD-MCNC: 9.9 MG/DL (ref 8.7–10.4)
CHLORIDE SERPL-SCNC: 101 MMOL/L (ref 98–112)
CO2 SERPL-SCNC: 38 MMOL/L (ref 21–32)
CREAT BLD-MCNC: 1.02 MG/DL
DEPRECATED RDW RBC AUTO: 44.1 FL (ref 35.1–46.3)
EGFRCR SERPLBLD CKD-EPI 2021: 81 ML/MIN/1.73M2 (ref 60–?)
EOSINOPHIL # BLD AUTO: 0.28 X10(3) UL (ref 0–0.7)
EOSINOPHIL NFR BLD AUTO: 3 %
ERYTHROCYTE [DISTWIDTH] IN BLOOD BY AUTOMATED COUNT: 12.2 % (ref 11–15)
GLUCOSE BLD-MCNC: 134 MG/DL (ref 70–99)
GLUCOSE BLDC GLUCOMTR-MCNC: 111 MG/DL (ref 70–99)
GLUCOSE BLDC GLUCOMTR-MCNC: 178 MG/DL (ref 70–99)
GLUCOSE BLDC GLUCOMTR-MCNC: 248 MG/DL (ref 70–99)
GLUCOSE BLDC GLUCOMTR-MCNC: 266 MG/DL (ref 70–99)
HCT VFR BLD AUTO: 33.2 %
HGB BLD-MCNC: 10.8 G/DL
IMM GRANULOCYTES # BLD AUTO: 0.07 X10(3) UL (ref 0–1)
IMM GRANULOCYTES NFR BLD: 0.7 %
LYMPHOCYTES # BLD AUTO: 1.35 X10(3) UL (ref 1–4)
LYMPHOCYTES NFR BLD AUTO: 14.2 %
MCH RBC QN AUTO: 32.1 PG (ref 26–34)
MCHC RBC AUTO-ENTMCNC: 32.5 G/DL (ref 31–37)
MCV RBC AUTO: 98.8 FL
MONOCYTES # BLD AUTO: 0.76 X10(3) UL (ref 0.1–1)
MONOCYTES NFR BLD AUTO: 8 %
NEUTROPHILS # BLD AUTO: 6.98 X10 (3) UL (ref 1.5–7.7)
NEUTROPHILS # BLD AUTO: 6.98 X10(3) UL (ref 1.5–7.7)
NEUTROPHILS NFR BLD AUTO: 73.7 %
OSMOLALITY SERPL CALC.SUM OF ELEC: 297 MOSM/KG (ref 275–295)
PLATELET # BLD AUTO: 261 10(3)UL (ref 150–450)
POTASSIUM SERPL-SCNC: 4.2 MMOL/L (ref 3.5–5.1)
RBC # BLD AUTO: 3.36 X10(6)UL
SODIUM SERPL-SCNC: 141 MMOL/L (ref 136–145)
WBC # BLD AUTO: 9.5 X10(3) UL (ref 4–11)

## 2024-11-15 PROCEDURE — 99233 SBSQ HOSP IP/OBS HIGH 50: CPT | Performed by: FAMILY MEDICINE

## 2024-11-15 RX ORDER — DOCUSATE SODIUM 100 MG/1
100 CAPSULE, LIQUID FILLED ORAL 2 TIMES DAILY
Status: DISCONTINUED | OUTPATIENT
Start: 2024-11-15 | End: 2024-11-21

## 2024-11-15 RX ORDER — SENNOSIDES 8.6 MG
8.6 TABLET ORAL 2 TIMES DAILY
Status: DISCONTINUED | OUTPATIENT
Start: 2024-11-15 | End: 2024-11-21

## 2024-11-15 RX ORDER — POLYETHYLENE GLYCOL 3350 17 G/17G
17 POWDER, FOR SOLUTION ORAL DAILY PRN
Status: DISCONTINUED | OUTPATIENT
Start: 2024-11-15 | End: 2024-11-21

## 2024-11-15 RX ORDER — BISACODYL 10 MG
10 SUPPOSITORY, RECTAL RECTAL
Status: DISCONTINUED | OUTPATIENT
Start: 2024-11-15 | End: 2024-11-21

## 2024-11-15 RX ADMIN — SENNOSIDES 8.6 MG: 8.6 MG TABLET ORAL at 21:19:00

## 2024-11-15 RX ADMIN — HEPARIN SODIUM 5000 UNITS: 5000 INJECTION, SOLUTION INTRAVENOUS; SUBCUTANEOUS at 08:54:00

## 2024-11-15 RX ADMIN — SENNOSIDES 8.6 MG: 8.6 MG TABLET ORAL at 14:22:00

## 2024-11-15 RX ADMIN — DOCUSATE SODIUM 100 MG: 100 CAPSULE, LIQUID FILLED ORAL at 21:19:00

## 2024-11-15 RX ADMIN — VANCOMYCIN HYDROCHLORIDE 1500 MG: at 14:22:00

## 2024-11-15 RX ADMIN — INSULIN DEGLUDEC 28 UNITS: 100 INJECTION, SOLUTION SUBCUTANEOUS at 21:20:00

## 2024-11-15 RX ADMIN — HEPARIN SODIUM 5000 UNITS: 5000 INJECTION, SOLUTION INTRAVENOUS; SUBCUTANEOUS at 21:20:00

## 2024-11-15 RX ADMIN — FUROSEMIDE 40 MG: 10 INJECTION INTRAMUSCULAR; INTRAVENOUS at 08:54:00

## 2024-11-15 RX ADMIN — LATANOPROST 1 DROP: 50 SOLUTION/ DROPS OPHTHALMIC at 21:20:00

## 2024-11-15 RX ADMIN — ASPIRIN 81 MG: 81 TABLET ORAL at 08:54:00

## 2024-11-15 RX ADMIN — FUROSEMIDE 40 MG: 10 INJECTION INTRAMUSCULAR; INTRAVENOUS at 17:37:00

## 2024-11-15 NOTE — PROGRESS NOTES
WOUND CARE NOTE    History:  Past Medical History:    Alternating esotropia    Alternating esotropia    BDR (background diabetic retinopathy) ([facility])    under the care of Dr. Brito- see progress note    Colon adenomas    x4    Diabetic macular edema of left eye ([facility])    S/P Eylea injection OS x 5 with Dr. Brito    Glaucoma suspect of both eyes    Primary open angle glaucoma (POAG) of left eye, mild stage    2/4/19 start Latanoprost qhs OS due to thinning of the optic nerve on OCT    Type II or unspecified type diabetes mellitus without mention of complication, not stated as uncontrolled     Past Surgical History:   Procedure Laterality Date    Colonoscopy  03/2008    Colonoscopy  04/05/2023    Strabismus surgery Bilateral 1959      Social History     Socioeconomic History    Marital status:    Tobacco Use    Smoking status: Never    Smokeless tobacco: Never   Vaping Use    Vaping status: Never Used   Substance and Sexual Activity    Alcohol use: Yes     Alcohol/week: 1.0 standard drink of alcohol     Types: 1 Glasses of wine per week     Comment: rarely, beer & liquor, 1 drink     Drug use: No   Other Topics Concern    Caffeine Concern Yes     Comment: coffee, soda, 3 cups daily     Social Drivers of Health     Food Insecurity: No Food Insecurity (11/14/2024)    Food Insecurity     Food Insecurity: Never true   Transportation Needs: No Transportation Needs (11/14/2024)    Transportation Needs     Lack of Transportation: No   Housing Stability: Low Risk  (11/14/2024)    Housing Stability     Housing Instability: No     BLE intact, feet are warm with dry scaly skin, pedal and post tibial pulses palpable 1+ edema left foot    PLAN   Recommendations:  CV, ID and Vascular are currently on consult  Elevate legs and feet in the bed and in the chair  Lotion to BLE intact dry skin   Heels elevated using pillows to offload heels  Glucose control to help promote wound healing    Wound(s)  Location: Left lateral  5th toe  Cleansing  Saline   Topical Betadine swab  Dressings 2x2 gauze and border foam  Frequency daily     Discharge Recommendations:  Per the pt. he follows with his podiatrist regularly      RN Consult new left 5th toe      ASSESSMENT   Yuri Score:  Yuri Scale Score: 18    Chart Reviewed: yes    Wound(s):  The pt. is resting in bed, he is agreeable for the left toe assessment, the pt. spoke of his recent trip and hospitalization abroad. See the wound assessment. Left 5th toe wound cleansed and dressed. Spoke with the nurse and MD.          Allergies: Patient has no known allergies.    Labs:   Lab Results   Component Value Date    WBC 9.5 11/15/2024    HGB 10.8 (L) 11/15/2024    HCT 33.2 (L) 11/15/2024    .0 11/15/2024    CREATSERUM 1.02 11/15/2024    BUN 22 11/15/2024     11/15/2024    K 4.2 11/15/2024     11/15/2024    CO2 38.0 (H) 11/15/2024     (H) 11/15/2024    CA 9.9 11/15/2024    ALB 4.1 07/09/2024    ALKPHO 76 07/09/2024    BILT 0.6 07/09/2024    TP 6.9 07/09/2024    AST 24 07/09/2024    ALT 12 07/09/2024     No results found for: \"PREALBUMIN\"      Time Spent 15 Minutes.    Kathy Santoro RN  [facility]  [facility]  [phone]     11/15/24 0951   Wound 11/14/24 Toe (Comment which one) Left;Lateral   Date First Assessed/Time First Assessed: 11/14/24 1551   Present on Original Admission: Yes  Primary Wound Type: Diabetic Ulcer  Location: (c) Toe (Comment which one)  Wound Location Orientation: Left;Lateral  Wound Description (Comments): Callus-fifth...   Wound Image     Site Assessment Dry;Fragile;White   Drainage Amount None   Treatments Cleansed;Saline;Cervical collar   Dressing 2x2s;Aquacel Foam   Dressing Changed New   Dressing Status Clean;Dry;Intact   Wound Depth (cm)   (calloused)   Non-staged Wound Description Full thickness   Jessica-wound Assessment Fragile;Edema;Dry;Pink;Red   State of Healing Non-healing   Wound Odor None   Shape   (round)    Rangel Scale Grade 1   Wound Follow Up   Follow up needed Yes

## 2024-11-15 NOTE — PROGRESS NOTES
Progress Note  Davin Hassan Patient Status:  Inpatient    1955 MRN [id number]   Location [facility] 3W/SW Attending Karen Paniagua MD   Hosp Day # 1 PCP Susana Magallon MD     Subjective:  No complaints  No CP, pressure or SOB    Objective:  /68 (BP Location: Left arm)   Pulse 78   Temp 97.9 °F (36.6 °C) (Oral)   Resp 18   Ht 6' 1\" (1.854 m)   Wt 221 lb 3.2 oz (100.3 kg)   SpO2 93%   BMI 29.18 kg/m²     Telemetry: Reviewed      Intake/Output:    Intake/Output Summary (Last 24 hours) at 11/15/2024 1046  Last data filed at 11/15/2024 1018  Gross per 24 hour   Intake 534 ml   Output 2100 ml   Net -1566 ml       Last 3 Weights   11/15/24 0502 221 lb 3.2 oz (100.3 kg)   24 1334 229 lb (103.9 kg)   24 1128 229 lb (103.9 kg)   24 1740 230 lb (104.3 kg)   10/24/24 1609 210 lb (95.3 kg)   23 0810 224 lb (101.6 kg)   22 1538 230 lb 6.4 oz (104.5 kg)       Labs:  Recent Labs   Lab 24  1301 11/15/24  0628   * 134*   BUN 24* 22   CREATSERUM 1.20 1.02   EGFRCR 67 81   CA 9.8 9.9    141   K 4.4 4.2    101   CO2 35.0* 38.0*     Recent Labs   Lab 24  1301 11/15/24  0628   RBC 3.43* 3.36*   HGB 11.1* 10.8*   HCT 34.6* 33.2*   .9* 98.8   MCH 32.4 32.1   MCHC 32.1 32.5   RDW 12.0 12.2   NEPRELIM  --  6.98   WBC 11.2* 9.5   .0 261.0         No results for input(s): \"TROP\", \"TROPHS\", \"CK\" in the last 168 hours.    Diagnostics:             Physical Exam:    Physical Exam  Vitals and nursing note reviewed.   Constitutional:       General: He is not in acute distress.     Appearance: Normal appearance. He is not ill-appearing or diaphoretic.   HENT:      Head: Normocephalic and atraumatic.   Eyes:      Extraocular Movements: Extraocular movements intact.   Cardiovascular:      Rate and Rhythm: Normal rate and regular rhythm.      Heart sounds: No murmur heard.     No friction rub. No gallop.   Pulmonary:      Effort:  Pulmonary effort is normal. No respiratory distress.      Breath sounds: Normal breath sounds.   Abdominal:      General: There is no distension.      Palpations: Abdomen is soft.   Musculoskeletal:         General: Swelling present. Normal range of motion.      Cervical back: Normal range of motion.   Skin:     General: Skin is warm and dry.      Capillary Refill: Capillary refill takes less than 2 seconds.      Coloration: Skin is not pale.   Neurological:      General: No focal deficit present.      Mental Status: He is alert and oriented to person, place, and time.   Psychiatric:         Mood and Affect: Mood normal.         Medications:   cefTRIAXone  2 g Intravenous Q24H    aspirin  324 mg Oral Once    insulin degludec  28 Units Subcutaneous Nightly    latanoprost  1 drop Both Eyes Nightly    insulin aspart  1-7 Units Subcutaneous TID CC    vancomycin  15 mg/kg Intravenous Q24H    heparin  5,000 Units Subcutaneous 2 times per day    furosemide  40 mg Intravenous BID (Diuretic)    aspirin  81 mg Oral Daily         Assessment/Plan:    MV CAD  Cellulitis  Ischemic cardiomyopathy with LVEF 45%  Diabetes  Hypertension  Hyperlipidemia    No complaints  Euvolemic on exam  Cont with IV lasix  CT surgery and ID on board. Will need surgical revascularization  Plan for MRI leg today    Cont to follow    Tahir Gonzalez MD  11/15/2024  10:46 AM

## 2024-11-15 NOTE — PROGRESS NOTES
Progress Note     Davin Hassan Patient Status:  Observation    1955 MRN [id number]   Location [facility] 3W/SW Attending Karen Paniagua MD   Hosp Day # 0 PCP Susana Magallon MD     Chief Complaint: patient presented with No chief complaint on file.      Subjective:   S: Patient denies any cp, no sob, no  n/v    Review of Systems:   10 point ROS completed and was negative, except for pertinent positive and negatives stated in subjective.    Objective:   Vital signs:  Temp:  [97.9 °F (36.6 °C)-98.3 °F (36.8 °C)] 97.9 °F (36.6 °C)  Pulse:  [51-78] 78  Resp:  [17-22] 18  BP: (106-157)/(57-80) 121/68  SpO2:  [93 %-97 %] 96 %    Wt Readings from Last 6 Encounters:   11/15/24 221 lb 3.2 oz (100.3 kg)   23 224 lb (101.6 kg)   22 230 lb 6.4 oz (104.5 kg)   22 230 lb (104.3 kg)   22 224 lb (101.6 kg)   19 221 lb (100.2 kg)         Physical Exam:    General: No acute distress. Alert ,         Respiratory: Clear to auscultation bilaterally. No wheezes. No rhonchi.  Cardiovascular: S1, S2. Regular rate and rhythm. No murmurs, rubs or gallops.   Abdomen: Soft, nontender, nondistended.  Positive bowel sounds. No rebound or guarding.  Neurologic: No focal neurological deficits.   Musculoskeletal: Moves all extremities.  Extremities: No edema.    Results:   Diagnostic Data:      Labs:    Labs Last 24 Hours:   BMP     CBC    Other     Na 141 Cl 101 BUN 22 Glu 134   Hb 10.8   PTT - Procal -   K 4.2 CO2 38.0 Cr 1.02   WBC 9.5 >< .0  INR - CRP -   Renal Lytes Endo    Hct 33.2   Trop - D dim -   eGFR - Ca 9.9 POC Gluc  111    LFT   pBNP - Lactic -   eGFR AA - PO4 - A1c 8.9   AST - APk - Prot -  LDL -     Mg - TSH -   ALT - T paige - Alb -        COVID-19 Lab Results    COVID-19  Lab Results   Component Value Date    COVID19 Detected (A) 2022       Pro-Calcitonin  No results for input(s): \"PCT\" in the last 168 hours.    Cardiac  No results for input(s): \"TROP\",  \"PBNP\" in the last 168 hours.    Creatinine Kinase  No results for input(s): \"CK\" in the last 168 hours.    Inflammatory Markers  No results for input(s): \"CRP\", \"MESERET\", \"LDH\", \"DDIMER\" in the last 168 hours.    Imaging: Imaging data reviewed in Epic.    Medications:    cefTRIAXone  2 g Intravenous Q24H    aspirin  324 mg Oral Once    insulin degludec  28 Units Subcutaneous Nightly    latanoprost  1 drop Both Eyes Nightly    insulin aspart  1-7 Units Subcutaneous TID CC    vancomycin  15 mg/kg Intravenous Q24H    heparin  5,000 Units Subcutaneous 2 times per day    furosemide  40 mg Intravenous BID (Diuretic)    aspirin  81 mg Oral Daily       Assessment & Plan:   ASSESSMENT / PLAN:     Problem List Items Addressed This Visit    None  Visit Diagnoses       Pre-op testing    -  Primary    Relevant Orders    Basic Metabolic Panel (8) (Completed)    CBC, Platelet; No Differential (Completed)    XR CHEST PA + LAT CHEST (Completed)    Abnormal nuclear stress test        Relevant Medications    aspirin chewable tab 324 mg    aspirin 81 MG Oral Tab EC    atorvastatin 80 MG Oral Tab    metoprolol tartrate 25 MG Oral Tab    heparin (Porcine) 5000 UNIT/ML injection 5,000 Units    furosemide (Lasix) 10 mg/mL injection 40 mg    aspirin DR tab 81 mg    Other Relevant Orders    CATH LEFT AND RIGHT CATH W/INTERVENTION (Completed)    Abnormal echocardiogram        Relevant Medications    aspirin chewable tab 324 mg    aspirin 81 MG Oral Tab EC    atorvastatin 80 MG Oral Tab    metoprolol tartrate 25 MG Oral Tab    heparin (Porcine) 5000 UNIT/ML injection 5,000 Units    furosemide (Lasix) 10 mg/mL injection 40 mg    aspirin DR tab 81 mg    Other Relevant Orders    CATH LEFT AND RIGHT CATH W/INTERVENTION (Completed)              69 y/o male with Pmhx of Insulin-dependent diabetes mellitus type 2, hyperlipidemia, peripheral diabetic neuropathy, glaucoma, and peripheral arterial disease.he was evaluated by Cardiology and had a stress  test which was abnormal.  Today, he had a cardiac angiogram done by his cardiologist, Dr. Barone, which showed reportedly multivessel coronary artery disease.  Patient will be admitted to the hospital for further cardiovascular surgery evaluation.       Multivessel CAD  CHF  -Cardiology and Cardiothoracic surgery consulted   -Lasix daily      LLE cellulitis with fifth toe ulceration  -ID following   -IV vanc and zosyn   -MRI pending    DM  SSI             Quality:  DVT Prophylaxis: heparin  CODE status: FULL   DISPO: pending clinical improvement.   Estimated date of discharge: To be determined  Discharge is dependent on: Improved clinical status  At this point Patient is expected to be discharge to: Home versus rehab      Plan of care discussed with Patient and RN.     Coordinated care with providers and counseling re: treatment plan and workup     Karen Paniagua MD    Supplementary Documentation:         **Certification      PHYSICIAN Certification of Need for Inpatient Hospitalization - Initial Certification    Patient will require inpatient services that will reasonably be expected to span two midnight's based on the clinical documentation in H+P.   Based on patients current state of illness, I anticipate that, after discharge, patient will require TBD.             I personally reviewed the available laboratories, imaging including operative report. I discussed/will discuss the case with patient and her nurse. I ordered laboratories studies. I adjusted medications including not applicable today. Medical decision making high, risk is high.     >55min spent, >50% spent counseling and coordinating care in the form of educating pt/family and d/w consultants and staff. Most of the time spent discussing the above plan.

## 2024-11-15 NOTE — PLAN OF CARE
Problem: Diabetes/Glucose Control  Goal: Glucose maintained within prescribed range  Description: INTERVENTIONS:  - Monitor Blood Glucose as ordered  - Assess for signs and symptoms of hyperglycemia and hypoglycemia  - Administer ordered medications to maintain glucose within target range  - Assess barriers to adequate nutritional intake and initiate nutrition consult as needed  - Instruct patient on self management of diabetes  Outcome: Progressing     Problem: Patient Centered Care  Goal: Patient preferences are identified and integrated in the patient's plan of care  Description: Interventions:  - What would you like us to know as we care for you? I want to get rid of infection in left leg.  - Provide timely, complete, and accurate information to patient/family  - Incorporate patient and family knowledge, values, beliefs, and cultural backgrounds into the planning and delivery of care  - Encourage patient/family to participate in care and decision-making at the level they choose  - Honor patient and family perspectives and choices  Outcome: Progressing     Problem: Patient/Family Goals  Goal: Patient/Family Long Term Goal  Description: Patient's Long Term Goal: to have CABG    Interventions:  - Cardiothoracic surgery on consult  -IV Furosemide  - See additional Care Plan goals for specific interventions  Outcome: Progressing  Goal: Patient/Family Short Term Goal  Description: Patient's Short Term Goal: to have infection in left leg gone.    Interventions:   - IV antibiotics  -ID on consult  - See additional Care Plan goals for specific interventions  Outcome: Progressing     Problem: CARDIOVASCULAR - ADULT  Goal: Maintains optimal cardiac output and hemodynamic stability  Description: INTERVENTIONS:  - Monitor vital signs, rhythm, and trends  - Monitor for bleeding, hypotension and signs of decreased cardiac output  - Evaluate effectiveness of vasoactive medications to optimize hemodynamic stability  - Monitor  arterial and/or venous puncture sites for bleeding and/or hematoma  - Assess quality of pulses, skin color and temperature  - Assess for signs of decreased coronary artery perfusion - ex. Angina  - Evaluate fluid balance, assess for edema, trend weights  Outcome: Progressing  Goal: Absence of cardiac arrhythmias or at baseline  Description: INTERVENTIONS:  - Continuous cardiac monitoring, monitor vital signs, obtain 12 lead EKG if indicated  - Evaluate effectiveness of antiarrhythmic and heart rate control medications as ordered  - Initiate emergency measures for life threatening arrhythmias  - Monitor electrolytes and administer replacement therapy as ordered  Outcome: Progressing

## 2024-11-15 NOTE — CM/SW NOTE
Per RN rounds this AM, pt candidate for CABG but needs MRI to r/out osteo of foot.    MRI pending.    Received call from Betsy w/ [facility] - pt may need IV Abx at [place]. Pt would be covered w/ them for In Office Infusions only and at 80% until pt's $3,700 OOP is met w/ insurance. At this time, pt has met $467 of that.    PLAN: TBD - pending MRI, clinical course/needs        SW/CM to remain available for support and/or discharge planning.         MS VaishaliW, LSW [id number]

## 2024-11-15 NOTE — CONSULTS
[facility]  [facility] ID CONSULT NOTE    Davin Hassan Patient Status:  Inpatient    1955 MRN [id number]   Location [facility] 3W/SW Attending Karen Paniagua MD   Hosp Day # 1 PCP Susana Magallon MD       Reason for Consultation:  LLE cellulitis, R/o fifth toe OM    ASSESSMENT:    Antibiotics: Vancomycin, ceftriaxone    # Acute LLE cellulitis with fifth toe ulceration, R/o OM   -On PO doxycycline and augmentin PTA  # Multivessel CAD   -CV surgery following  # PAD  # Diabetes mellitus    PLAN:  -  Continue on vancomycin. Start ceftriaxone. Awaiting MRI.  -  Follow fever curve, wbc.  -  Reviewed labs, micro, imaging reports, available old records.  -  Case d/w patient, RN.    History of Present Illness:  Davin Hassan is a 68 year old male with a history of diabetes mellitus, PAD, who was recently on a trip to [place] and was hospitalized in [place] for LLE cellulitis along with CHF given dyspnea on exertion, was given augmentin and diuretics and flew home. Developed a callus on his fifth toe which he states was draining. Saw podiatry this past Wednesday and started on doxycycline. Had planned LHC yesterday with findings of severe triple vessel disease and seen by CV surgery with concerns regarding L foot ulcer. Vancomycin started and plans for L foot MRI. ID consulted.    History:  Past Medical History:    Alternating esotropia    Alternating esotropia    BDR (background diabetic retinopathy) ([facility])    under the care of Dr. Brito- see progress note    Colon adenomas    x4    Diabetic macular edema of left eye ([facility])    S/P Eylea injection OS x 5 with Dr. Brito    Glaucoma suspect of both eyes    Primary open angle glaucoma (POAG) of left eye, mild stage    19 start Latanoprost qhs OS due to thinning of the optic nerve on OCT    Type II or unspecified type diabetes mellitus without mention of complication, not stated as  uncontrolled     Past Surgical History:   Procedure Laterality Date    Colonoscopy  03/2008    Colonoscopy  04/05/2023    Strabismus surgery Bilateral 1959     Family History   Problem Relation Age of Onset    Diabetes Father     Glaucoma Father     Macular degeneration Father     Breast Cancer Mother     Heart Disorder Brother       reports that he has never smoked. He has never used smokeless tobacco. He reports current alcohol use of about 1.0 standard drink of alcohol per week. He reports that he does not use drugs.    Allergies:  Allergies[1]    Medications:    Current Facility-Administered Medications:     cefTRIAXone (Rocephin) 2 g in sodium chloride 0.9% 100 mL IVPB-ADDV, 2 g, Intravenous, Q24H    aspirin chewable tab 324 mg, 324 mg, Oral, Once    insulin degludec (Tresiba) 100 units/mL flextouch 28 Units, 28 Units, Subcutaneous, Nightly    latanoprost (Xalatan) 0.005 % ophthalmic solution 1 drop, 1 drop, Both Eyes, Nightly    glucose (Dex4) 15 GM/59ML oral liquid 15 g, 15 g, Oral, Q15 Min PRN **OR** glucose (Glutose) 40% oral gel 15 g, 15 g, Oral, Q15 Min PRN **OR** glucose-vitamin C (Dex-4) chewable tab 4 tablet, 4 tablet, Oral, Q15 Min PRN **OR** dextrose 50% injection 50 mL, 50 mL, Intravenous, Q15 Min PRN **OR** glucose (Dex4) 15 GM/59ML oral liquid 30 g, 30 g, Oral, Q15 Min PRN **OR** glucose (Glutose) 40% oral gel 30 g, 30 g, Oral, Q15 Min PRN **OR** glucose-vitamin C (Dex-4) chewable tab 8 tablet, 8 tablet, Oral, Q15 Min PRN    insulin aspart (NovoLOG) 100 Units/mL FlexPen 1-7 Units, 1-7 Units, Subcutaneous, TID CC    vancomycin (Vancocin) 1.5 g in sodium chloride 0.9% 250mL IVPB premix, 15 mg/kg, Intravenous, Q24H    heparin (Porcine) 5000 UNIT/ML injection 5,000 Units, 5,000 Units, Subcutaneous, 2 times per day    acetaminophen (Tylenol Extra Strength) tab 500 mg, 500 mg, Oral, Q4H PRN    ondansetron (Zofran) 4 MG/2ML injection 4 mg, 4 mg, Intravenous, Q6H PRN    metoclopramide (Reglan) 5 mg/mL  injection 10 mg, 10 mg, Intravenous, Q8H PRN    temazepam (Restoril) cap 15 mg, 15 mg, Oral, Nightly PRN    furosemide (Lasix) 10 mg/mL injection 40 mg, 40 mg, Intravenous, BID (Diuretic)    aspirin DR tab 81 mg, 81 mg, Oral, Daily    Review of Systems:  CONSTITUTIONAL:  No weight loss, weakness or fatigue.  HEENT:  Eyes:  No visual loss, blurred vision, double vision or yellow sclerae. Ears, Nose, Throat:  No hearing loss, sneezing, congestion, runny nose or sore throat.  SKIN:  No rash or itching.  CARDIOVASCULAR:  No chest pain, chest pressure or chest discomfort  RESPIRATORY:  +LIRIANO  GASTROINTESTINAL:  No anorexia, nausea, vomiting or diarrhea. No abdominal pain or blood.  GENITOURINARY:  No Burning on urination.   NEUROLOGICAL:  No headache, dizziness, syncope, paralysis, ataxia, numbness or tingling in the extremities.  MUSCULOSKELETAL:  +LLE swelling  HEMATOLOGIC:  No anemia, bleeding or bruising.  ALLERGIES:  No history of asthma, hives, eczema or rhinitis.    Physical Exam:  Vital signs: Blood pressure 121/68, pulse 78, temperature 97.9 °F (36.6 °C), temperature source Oral, resp. rate 18, height 6' 1\" (1.854 m), weight 221 lb 3.2 oz (100.3 kg), SpO2 96%.    General: Awake, in bed  HEENT: Moist mucous membranes.   Neck: No lymphadenopathy.  Supple.  Cardiovascular: RRR  Respiratory: Clear to auscultation bilaterally.  No wheezes. No rhonchi.  Abdomen: Soft, nontender, nondistended.   Musculoskeletal: No edema noted  Integument: LLE with erythema, fifth toe with lateral ulceration, dry  Lines: PIV+    Laboratory Data:  Recent Labs   Lab 11/15/24  0628   RBC 3.36*   HGB 10.8*   HCT 33.2*   MCV 98.8   MCH 32.1   MCHC 32.5   RDW 12.2   NEPRELIM 6.98   WBC 9.5   .0     Recent Labs   Lab 11/12/24  1301 11/15/24  0628   * 134*   BUN 24* 22   CREATSERUM 1.20 1.02   CA 9.8 9.9    141   K 4.4 4.2    101   CO2 35.0* 38.0*       Microbiology: Reviewed in EMR    Radiology: Reviewed    Thank you  for allowing us to participate in the care of this patient. Please do not hesitate to call if you have any questions.   We will continue to follow with you and will make further recommendations based on his progress.    [facility]  [phone]  11/15/2024         [1] No Known Allergies

## 2024-11-15 NOTE — PLAN OF CARE
No complaints overnight. SBA w/ walker. RUE C/D/I/no hematoma.    Call light within reach, safety precautions in place  Problem: Diabetes/Glucose Control  Goal: Glucose maintained within prescribed range  Description: INTERVENTIONS:  - Monitor Blood Glucose as ordered  - Assess for signs and symptoms of hyperglycemia and hypoglycemia  - Administer ordered medications to maintain glucose within target range  - Assess barriers to adequate nutritional intake and initiate nutrition consult as needed  - Instruct patient on self management of diabetes  Outcome: Progressing     Problem: CARDIOVASCULAR - ADULT  Goal: Maintains optimal cardiac output and hemodynamic stability  Description: INTERVENTIONS:  - Monitor vital signs, rhythm, and trends  - Monitor for bleeding, hypotension and signs of decreased cardiac output  - Evaluate effectiveness of vasoactive medications to optimize hemodynamic stability  - Monitor arterial and/or venous puncture sites for bleeding and/or hematoma  - Assess quality of pulses, skin color and temperature  - Assess for signs of decreased coronary artery perfusion - ex. Angina  - Evaluate fluid balance, assess for edema, trend weights  Outcome: Progressing  Goal: Absence of cardiac arrhythmias or at baseline  Description: INTERVENTIONS:  - Continuous cardiac monitoring, monitor vital signs, obtain 12 lead EKG if indicated  - Evaluate effectiveness of antiarrhythmic and heart rate control medications as ordered  - Initiate emergency measures for life threatening arrhythmias  - Monitor electrolytes and administer replacement therapy as ordered  Outcome: Progressing

## 2024-11-16 LAB
GLUCOSE BLDC GLUCOMTR-MCNC: 202 MG/DL (ref 70–99)
GLUCOSE BLDC GLUCOMTR-MCNC: 210 MG/DL (ref 70–99)
GLUCOSE BLDC GLUCOMTR-MCNC: 309 MG/DL (ref 70–99)
GLUCOSE BLDC GLUCOMTR-MCNC: 72 MG/DL (ref 70–99)

## 2024-11-16 PROCEDURE — 99232 SBSQ HOSP IP/OBS MODERATE 35: CPT | Performed by: FAMILY MEDICINE

## 2024-11-16 RX ORDER — TORSEMIDE 20 MG/1
20 TABLET ORAL DAILY
Status: DISCONTINUED | OUTPATIENT
Start: 2024-11-16 | End: 2024-11-21

## 2024-11-16 RX ORDER — ROSUVASTATIN CALCIUM 20 MG/1
40 TABLET, COATED ORAL NIGHTLY
Status: DISCONTINUED | OUTPATIENT
Start: 2024-11-16 | End: 2024-11-21

## 2024-11-16 RX ADMIN — HEPARIN SODIUM 5000 UNITS: 5000 INJECTION, SOLUTION INTRAVENOUS; SUBCUTANEOUS at 22:15:00

## 2024-11-16 RX ADMIN — DOCUSATE SODIUM 100 MG: 100 CAPSULE, LIQUID FILLED ORAL at 08:37:00

## 2024-11-16 RX ADMIN — ROSUVASTATIN CALCIUM 40 MG: 20 TABLET, COATED ORAL at 22:14:00

## 2024-11-16 RX ADMIN — SENNOSIDES 8.6 MG: 8.6 MG TABLET ORAL at 22:13:00

## 2024-11-16 RX ADMIN — TORSEMIDE 20 MG: 20 TABLET ORAL at 16:46:00

## 2024-11-16 RX ADMIN — FUROSEMIDE 40 MG: 10 INJECTION INTRAMUSCULAR; INTRAVENOUS at 08:37:00

## 2024-11-16 RX ADMIN — HEPARIN SODIUM 5000 UNITS: 5000 INJECTION, SOLUTION INTRAVENOUS; SUBCUTANEOUS at 08:37:00

## 2024-11-16 RX ADMIN — INSULIN DEGLUDEC 28 UNITS: 100 INJECTION, SOLUTION SUBCUTANEOUS at 22:09:00

## 2024-11-16 RX ADMIN — ASPIRIN 81 MG: 81 TABLET ORAL at 08:37:00

## 2024-11-16 RX ADMIN — VANCOMYCIN HYDROCHLORIDE 1500 MG: at 13:31:00

## 2024-11-16 RX ADMIN — LATANOPROST 1 DROP: 50 SOLUTION/ DROPS OPHTHALMIC at 22:15:00

## 2024-11-16 RX ADMIN — SENNOSIDES 8.6 MG: 8.6 MG TABLET ORAL at 08:37:00

## 2024-11-16 NOTE — PROGRESS NOTES
Cardiology Progress Note    Davin Hassan Patient Status:  Inpatient    1955 MRN [id number]   Location [facility] 3W/SW Attending Karen Paniagua MD   Hosp Day # 2 PCP Susana Magallon MD     Interval Note:  Patient seen and examined  Feels well, no chest pain or shortness of breath since catheterization  Awaiting foot MRI to assess for osteomyelitis      --------------------------------------------------------------------------------------------------------------------------------  ROS 12 systems reviewed, pertinent findings above.  ROS    History:  Past Medical History:    Alternating esotropia    Alternating esotropia    BDR (background diabetic retinopathy) ([facility])    under the care of Dr. Brito- see progress note    Colon adenomas    x4    Diabetic macular edema of left eye ([facility])    S/P Eylea injection OS x 5 with Dr. Brito    Glaucoma suspect of both eyes    Primary open angle glaucoma (POAG) of left eye, mild stage    19 start Latanoprost qhs OS due to thinning of the optic nerve on OCT    Type II or unspecified type diabetes mellitus without mention of complication, not stated as uncontrolled     Past Surgical History:   Procedure Laterality Date    Colonoscopy  2008    Colonoscopy  2023    Strabismus surgery Bilateral      Family History   Problem Relation Age of Onset    Diabetes Father     Glaucoma Father     Macular degeneration Father     Breast Cancer Mother     Heart Disorder Brother       reports that he has never smoked. He has never used smokeless tobacco. He reports current alcohol use of about 1.0 standard drink of alcohol per week. He reports that he does not use drugs.    Objective:   Temp: 98.3 °F (36.8 °C)  Pulse: 59  Resp: 17  BP: 116/58    Intake/Output:     Intake/Output Summary (Last 24 hours) at 2024 1544  Last data filed at 2024 1438  Gross per 24 hour   Intake 637 ml   Output 3400 ml   Net -2763 ml       Physical  Exam:    General: aox3  HEENT: Normocephalic, anicteric sclera, neck supple.  Neck: No JVD, carotids 2+, no bruits.  Cardiac: Regular rate and rhythm. S1, S2 normal. No murmur, pericardial rub, S3.  Lungs: Clear without wheezes, rales, rhonchi or dullness.  Normal excursions and effort.  Abdomen: Soft, non-tender. BS-present.  Extremities: Without clubbing, cyanosis or edema.  Peripheral pulses are 2+.  Neurologic: Non-focal  Skin: Warm and dry.       Assessment   Severe multivessel coronary artery disease  Diabetes  Diabetic neuropathy  Left lower extremity cellulitis, toe ulceration  Peripheral arterial disease    Plan  - Foot MRI pending  - JAYJAY reviewed, decent pressure waveforms-hold off on peripheral angiogram for time being.  If concern for ischemic etiology then can consider TBI versus peripheral angiogram  -Patient eval by CT surgery-plan for CABG once cleared from infection standpoint  -Switch off IV diuretics and start torsemide 20 mg daily  Continue aspirin, start statin    Thank you for allowing me to take part in the care of Davin Hassan. Please call with any questions of concerns.      Level of care: L3    Jose Foote DO  [facility]   Interventional Cardiac and Vascular Services      Jose Foote DO  November 16, 2024  3:44 PM

## 2024-11-16 NOTE — PLAN OF CARE
Monitoring vital signs, stable at this time. No acute changes at this moment. Monitoring blood glucose levels. Fall precautions in place-- bed alarm on, bed locked in lowest position, call light within reach. Frequent rounding by nursing staff.      Problem: Diabetes/Glucose Control  Goal: Glucose maintained within prescribed range  Description: INTERVENTIONS:  - Monitor Blood Glucose as ordered  - Assess for signs and symptoms of hyperglycemia and hypoglycemia  - Administer ordered medications to maintain glucose within target range  - Assess barriers to adequate nutritional intake and initiate nutrition consult as needed  - Instruct patient on self management of diabetes  Outcome: Progressing     Problem: Patient Centered Care  Goal: Patient preferences are identified and integrated in the patient's plan of care  Description: Interventions:  - What would you like us to know as we care for you? Home with wife  - Provide timely, complete, and accurate information to patient/family  - Incorporate patient and family knowledge, values, beliefs, and cultural backgrounds into the planning and delivery of care  - Encourage patient/family to participate in care and decision-making at the level they choose  - Honor patient and family perspectives and choices  Outcome: Progressing     Problem: Patient/Family Goals  Goal: Patient/Family Long Term Goal  Description: Patient's Long Term Goal: To get dishcarged    Interventions:  - Monitor vital signs   - Monitor appropriate labs   - Monitor blood glucose levels   - Pain management   - Administer medications per order   - Follow MD orders   - Diagnostics per order   - Update/inform patient and family on plan of care   - Discharge planning   - See additional Care Plan goals for specific interventions  Outcome: Progressing  Goal: Patient/Family Short Term Goal  Description: Patient's Short Term Goal: To have their MRI done soon    Interventions:   - Monitor vital signs   - Monitor  appropriate labs   - Monitor blood glucose levels   - Pain management   - Administer medications per order   - Follow MD orders   - Diagnostics per order   - Update/inform patient and family on plan of care   - Discharge planning   - See additional Care Plan goals for specific interventions  Outcome: Progressing     Problem: CARDIOVASCULAR - ADULT  Goal: Maintains optimal cardiac output and hemodynamic stability  Description: INTERVENTIONS:  - Monitor vital signs, rhythm, and trends  - Monitor for bleeding, hypotension and signs of decreased cardiac output  - Evaluate effectiveness of vasoactive medications to optimize hemodynamic stability  - Monitor arterial and/or venous puncture sites for bleeding and/or hematoma  - Assess quality of pulses, skin color and temperature  - Assess for signs of decreased coronary artery perfusion - ex. Angina  - Evaluate fluid balance, assess for edema, trend weights  Outcome: Progressing  Goal: Absence of cardiac arrhythmias or at baseline  Description: INTERVENTIONS:  - Continuous cardiac monitoring, monitor vital signs, obtain 12 lead EKG if indicated  - Evaluate effectiveness of antiarrhythmic and heart rate control medications as ordered  - Initiate emergency measures for life threatening arrhythmias  - Monitor electrolytes and administer replacement therapy as ordered  Outcome: Progressing     Problem: SKIN/TISSUE INTEGRITY - ADULT  Goal: Incision(s), wounds(s) or drain site(s) healing without S/S of infection  Description: INTERVENTIONS:  - Assess and document risk factors for pressure ulcer development  - Assess and document skin integrity  - Assess and document dressing/incision, wound bed, drain sites and surrounding tissue  - Implement wound care per orders  - Initiate isolation precautions as appropriate  - Initiate Pressure Ulcer prevention bundle as indicated  Outcome: Progressing

## 2024-11-16 NOTE — PROGRESS NOTES
Progress Note     Davin Hassan Patient Status:  Observation    1955 MRN [id number]   Location [facility] 3W/SW Attending Karen Paniagua MD   Hosp Day # 2 PCP Susana Magallon MD     Chief Complaint: patient presented with No chief complaint on file.      Subjective:   S: Patient denies any cp, no sob, no  n/v    Review of Systems:   10 point ROS completed and was negative, except for pertinent positive and negatives stated in subjective.    Objective:   Vital signs:  Temp:  [97.8 °F (36.6 °C)-98.5 °F (36.9 °C)] 98.2 °F (36.8 °C)  Pulse:  [59-73] 63  Resp:  [16-18] 18  BP: (109-130)/(50-66) 115/52  SpO2:  [94 %-96 %] 95 %    Wt Readings from Last 6 Encounters:   24 213 lb 9.6 oz (96.9 kg)   23 224 lb (101.6 kg)   22 230 lb 6.4 oz (104.5 kg)   22 230 lb (104.3 kg)   22 224 lb (101.6 kg)   19 221 lb (100.2 kg)         Physical Exam:    General: No acute distress. Alert ,         Respiratory: Clear to auscultation bilaterally. No wheezes. No rhonchi.  Cardiovascular: S1, S2. Regular rate and rhythm. No murmurs, rubs or gallops.   Abdomen: Soft, nontender, nondistended.  Positive bowel sounds. No rebound or guarding.  Neurologic: No focal neurological deficits.   Musculoskeletal: Moves all extremities.  Extremities: No edema.    Results:   Diagnostic Data:      Labs:    Labs Last 24 Hours:   BMP     CBC    Other     Na - Cl - BUN - Glu -   Hb -   PTT - Procal -   K - CO2 - Cr -   WBC - >< PLT -  INR - CRP -   Renal Lytes Endo    Hct -   Trop - D dim -   eGFR - Ca - POC Gluc  202    LFT   pBNP - Lactic -   eGFR AA - PO4 - A1c -   AST - APk - Prot -  LDL -     Mg - TSH -   ALT - T paige - Alb -        COVID-19 Lab Results    COVID-19  Lab Results   Component Value Date    COVID19 Detected (A) 2022       Pro-Calcitonin  No results for input(s): \"PCT\" in the last 168 hours.    Cardiac  No results for input(s): \"TROP\", \"PBNP\" in the last 168  hours.    Creatinine Kinase  No results for input(s): \"CK\" in the last 168 hours.    Inflammatory Markers  No results for input(s): \"CRP\", \"MESERET\", \"LDH\", \"DDIMER\" in the last 168 hours.    Imaging: Imaging data reviewed in Epic.    Medications:    cefTRIAXone  2 g Intravenous Q24H    sennosides  8.6 mg Oral BID    docusate sodium  100 mg Oral BID    aspirin  324 mg Oral Once    insulin degludec  28 Units Subcutaneous Nightly    latanoprost  1 drop Both Eyes Nightly    insulin aspart  1-7 Units Subcutaneous TID CC    vancomycin  15 mg/kg Intravenous Q24H    heparin  5,000 Units Subcutaneous 2 times per day    furosemide  40 mg Intravenous BID (Diuretic)    aspirin  81 mg Oral Daily       Assessment & Plan:   ASSESSMENT / PLAN:     Problem List Items Addressed This Visit          Health Encounters    Pre-op testing - Primary    Relevant Orders    Basic Metabolic Panel (8) (Completed)    CBC, Platelet; No Differential (Completed)    XR CHEST PA + LAT CHEST (Completed)     Other Visit Diagnoses       Abnormal nuclear stress test        Relevant Medications    aspirin chewable tab 324 mg    heparin (Porcine) 1000 UNIT/ML injection (Completed)    fentaNYL (Sublimaze) 50 mcg/mL injection (Completed)    verapamil (Isoptin) 2.5 mg/mL injection (Completed)    lidocaine PF (Xylocaine-MPF) 2 % injection (Completed)    Nitroglycerin in D5W 200-5 MCG/ML-% injection (Completed)    aspirin 81 MG Oral Tab EC    atorvastatin 80 MG Oral Tab    metoprolol tartrate 25 MG Oral Tab    heparin (Porcine) 5000 UNIT/ML injection 5,000 Units    furosemide (Lasix) 10 mg/mL injection 40 mg    aspirin DR tab 81 mg    Other Relevant Orders    CATH LEFT AND RIGHT CATH W/INTERVENTION (Completed)    Abnormal echocardiogram        Relevant Medications    aspirin chewable tab 324 mg    heparin (Porcine) 1000 UNIT/ML injection (Completed)    verapamil (Isoptin) 2.5 mg/mL injection (Completed)    lidocaine PF (Xylocaine-MPF) 2 % injection (Completed)     Nitroglycerin in D5W 200-5 MCG/ML-% injection (Completed)    aspirin 81 MG Oral Tab EC    atorvastatin 80 MG Oral Tab    metoprolol tartrate 25 MG Oral Tab    heparin (Porcine) 5000 UNIT/ML injection 5,000 Units    furosemide (Lasix) 10 mg/mL injection 40 mg    aspirin DR tab 81 mg    magnesium hydroxide (Milk of Magnesia) 400 MG/5ML oral suspension 30 mL    Other Relevant Orders    CATH LEFT AND RIGHT CATH W/INTERVENTION (Completed)              69 y/o male with Pmhx of Insulin-dependent diabetes mellitus type 2, hyperlipidemia, peripheral diabetic neuropathy, glaucoma, and peripheral arterial disease.he was evaluated by Cardiology and had a stress test which was abnormal.  Today, he had a cardiac angiogram done by his cardiologist, Dr. Barone, which showed reportedly multivessel coronary artery disease.  Patient will be admitted to the hospital for further cardiovascular surgery evaluation.       Multivessel CAD  CHF  -Cardiology and Cardiothoracic surgery consulted   -Lasix daily      LLE cellulitis with fifth toe ulceration  -ID following   -IV vanc and zosyn   -MRI pending    DM  SSI     Quality:  DVT Prophylaxis: heparin  CODE status: FULL   DISPO: pending clinical improvement.   Estimated date of discharge: To be determined  Discharge is dependent on: Improved clinical status  At this point Patient is expected to be discharge to: Home versus rehab      Plan of care discussed with Patient and RN.     Coordinated care with providers and counseling re: treatment plan and workup     Karen Paniagua MD    Supplementary Documentation:         **Certification      PHYSICIAN Certification of Need for Inpatient Hospitalization - Initial Certification    Patient will require inpatient services that will reasonably be expected to span two midnight's based on the clinical documentation in H+P.   Based on patients current state of illness, I anticipate that, after discharge, patient will require TBD.             I  personally reviewed the available laboratories, imaging including operative report. I discussed/will discuss the case with patient and her nurse. I ordered laboratories studies. I adjusted medications including not applicable today. Medical decision making high, risk is high.     >55min spent, >50% spent counseling and coordinating care in the form of educating pt/family and d/w consultants and staff. Most of the time spent discussing the above plan.

## 2024-11-16 NOTE — PLAN OF CARE
Aox4. Room air. Self. MRI foot ordered.    Problem: SKIN/TISSUE INTEGRITY - ADULT  Goal: Incision(s), wounds(s) or drain site(s) healing without S/S of infection  Description: INTERVENTIONS:  - Assess and document risk factors for pressure ulcer development  - Assess and document skin integrity  - Assess and document dressing/incision, wound bed, drain sites and surrounding tissue  - Implement wound care per orders  - Initiate isolation precautions as appropriate  - Initiate Pressure Ulcer prevention bundle as indicated  Outcome: Progressing

## 2024-11-16 NOTE — PLAN OF CARE
Alert x4. Denies pain or discomfort.VSS. iv abx. Awaiting MRI  Problem: Diabetes/Glucose Control  Goal: Glucose maintained within prescribed range  Description: INTERVENTIONS:  - Monitor Blood Glucose as ordered  - Assess for signs and symptoms of hyperglycemia and hypoglycemia  - Administer ordered medications to maintain glucose within target range  - Assess barriers to adequate nutritional intake and initiate nutrition consult as needed  - Instruct patient on self management of diabetes  Outcome: Progressing     Problem: Patient Centered Care  Goal: Patient preferences are identified and integrated in the patient's plan of care  Description: Interventions:  - What would you like us to know as we care for you? I live with my wife  - Provide timely, complete, and accurate information to patient/family  - Incorporate patient and family knowledge, values, beliefs, and cultural backgrounds into the planning and delivery of care  - Encourage patient/family to participate in care and decision-making at the level they choose  - Honor patient and family perspectives and choices  Outcome: Progressing     Problem: Patient/Family Goals  Goal: Patient/Family Long Term Goal  Description: Patient's Long Term Goal: cardiac issues taken care of    Interventions:  - cabg  - See additional Care Plan goals for specific interventions  Outcome: Progressing  Goal: Patient/Family Short Term Goal  Description: Patient's Short Term Goal: go home    Interventions:   - MRI  - See additional Care Plan goals for specific interventions  Outcome: Progressing     Problem: CARDIOVASCULAR - ADULT  Goal: Maintains optimal cardiac output and hemodynamic stability  Description: INTERVENTIONS:  - Monitor vital signs, rhythm, and trends  - Monitor for bleeding, hypotension and signs of decreased cardiac output  - Evaluate effectiveness of vasoactive medications to optimize hemodynamic stability  - Monitor arterial and/or venous puncture sites for  bleeding and/or hematoma  - Assess quality of pulses, skin color and temperature  - Assess for signs of decreased coronary artery perfusion - ex. Angina  - Evaluate fluid balance, assess for edema, trend weights  Outcome: Progressing  Goal: Absence of cardiac arrhythmias or at baseline  Description: INTERVENTIONS:  - Continuous cardiac monitoring, monitor vital signs, obtain 12 lead EKG if indicated  - Evaluate effectiveness of antiarrhythmic and heart rate control medications as ordered  - Initiate emergency measures for life threatening arrhythmias  - Monitor electrolytes and administer replacement therapy as ordered  Outcome: Progressing

## 2024-11-17 ENCOUNTER — APPOINTMENT (OUTPATIENT)
Dept: MRI IMAGING | Facility: HOSPITAL | Age: 69
DRG: 628 | End: 2024-11-17
Attending: HOSPITALIST
Payer: MEDICARE

## 2024-11-17 ENCOUNTER — APPOINTMENT (OUTPATIENT)
Dept: MRI IMAGING | Facility: HOSPITAL | Age: 69
End: 2024-11-17
Attending: HOSPITALIST
Payer: MEDICARE

## 2024-11-17 LAB
ANION GAP SERPL CALC-SCNC: 2 MMOL/L (ref 0–18)
BUN BLD-MCNC: 23 MG/DL (ref 9–23)
BUN/CREAT SERPL: 24.2 (ref 10–20)
CALCIUM BLD-MCNC: 9.4 MG/DL (ref 8.7–10.4)
CHLORIDE SERPL-SCNC: 103 MMOL/L (ref 98–112)
CO2 SERPL-SCNC: 37 MMOL/L (ref 21–32)
CREAT BLD-MCNC: 0.95 MG/DL
EGFRCR SERPLBLD CKD-EPI 2021: 88 ML/MIN/1.73M2 (ref 60–?)
GLUCOSE BLD-MCNC: 65 MG/DL (ref 70–99)
GLUCOSE BLDC GLUCOMTR-MCNC: 114 MG/DL (ref 70–99)
GLUCOSE BLDC GLUCOMTR-MCNC: 141 MG/DL (ref 70–99)
GLUCOSE BLDC GLUCOMTR-MCNC: 227 MG/DL (ref 70–99)
GLUCOSE BLDC GLUCOMTR-MCNC: 293 MG/DL (ref 70–99)
GLUCOSE BLDC GLUCOMTR-MCNC: 66 MG/DL (ref 70–99)
OSMOLALITY SERPL CALC.SUM OF ELEC: 296 MOSM/KG (ref 275–295)
POTASSIUM SERPL-SCNC: 3.6 MMOL/L (ref 3.5–5.1)
SODIUM SERPL-SCNC: 142 MMOL/L (ref 136–145)
VANCOMYCIN PEAK SERPL-MCNC: 20 UG/ML (ref 30–50)

## 2024-11-17 PROCEDURE — 73718 MRI LOWER EXTREMITY W/O DYE: CPT | Performed by: HOSPITALIST

## 2024-11-17 PROCEDURE — 99232 SBSQ HOSP IP/OBS MODERATE 35: CPT | Performed by: FAMILY MEDICINE

## 2024-11-17 RX ORDER — INSULIN DEGLUDEC 100 U/ML
25 INJECTION, SOLUTION SUBCUTANEOUS NIGHTLY
Status: DISCONTINUED | OUTPATIENT
Start: 2024-11-17 | End: 2024-11-21

## 2024-11-17 RX ADMIN — SENNOSIDES 8.6 MG: 8.6 MG TABLET ORAL at 09:27:00

## 2024-11-17 RX ADMIN — ROSUVASTATIN CALCIUM 40 MG: 20 TABLET, COATED ORAL at 21:27:00

## 2024-11-17 RX ADMIN — HEPARIN SODIUM 5000 UNITS: 5000 INJECTION, SOLUTION INTRAVENOUS; SUBCUTANEOUS at 09:27:00

## 2024-11-17 RX ADMIN — TORSEMIDE 20 MG: 20 TABLET ORAL at 09:27:00

## 2024-11-17 RX ADMIN — ASPIRIN 81 MG: 81 TABLET ORAL at 09:27:00

## 2024-11-17 RX ADMIN — HEPARIN SODIUM 5000 UNITS: 5000 INJECTION, SOLUTION INTRAVENOUS; SUBCUTANEOUS at 21:28:00

## 2024-11-17 RX ADMIN — INSULIN DEGLUDEC 25 UNITS: 100 INJECTION, SOLUTION SUBCUTANEOUS at 21:28:00

## 2024-11-17 RX ADMIN — LATANOPROST 1 DROP: 50 SOLUTION/ DROPS OPHTHALMIC at 21:28:00

## 2024-11-17 RX ADMIN — DOCUSATE SODIUM 100 MG: 100 CAPSULE, LIQUID FILLED ORAL at 09:27:00

## 2024-11-17 RX ADMIN — VANCOMYCIN HYDROCHLORIDE 1500 MG: at 15:39:00

## 2024-11-17 NOTE — PLAN OF CARE
Patient alert and oriented x4. Denies chest pain/SOB. Vitals WNL. Reports occasional pain to LLE. Declined pain medication. Still waiting for MRI of L foot to r/o osteomyelitis.     Problem: Diabetes/Glucose Control  Goal: Glucose maintained within prescribed range  Description: INTERVENTIONS:  - Monitor Blood Glucose as ordered  - Assess for signs and symptoms of hyperglycemia and hypoglycemia  - Administer ordered medications to maintain glucose within target range  - Assess barriers to adequate nutritional intake and initiate nutrition consult as needed  - Instruct patient on self management of diabetes  Outcome: Progressing     Problem: Patient Centered Care  Goal: Patient preferences are identified and integrated in the patient's plan of care  Description: Interventions:  - What would you like us to know as we care for you?   - Provide timely, complete, and accurate information to patient/family  - Incorporate patient and family knowledge, values, beliefs, and cultural backgrounds into the planning and delivery of care  - Encourage patient/family to participate in care and decision-making at the level they choose  - Honor patient and family perspectives and choices  Outcome: Progressing     Problem: CARDIOVASCULAR - ADULT  Goal: Maintains optimal cardiac output and hemodynamic stability  Description: INTERVENTIONS:  - Monitor vital signs, rhythm, and trends  - Monitor for bleeding, hypotension and signs of decreased cardiac output  - Evaluate effectiveness of vasoactive medications to optimize hemodynamic stability  - Monitor arterial and/or venous puncture sites for bleeding and/or hematoma  - Assess quality of pulses, skin color and temperature  - Assess for signs of decreased coronary artery perfusion - ex. Angina  - Evaluate fluid balance, assess for edema, trend weights  Outcome: Progressing  Goal: Absence of cardiac arrhythmias or at baseline  Description: INTERVENTIONS:  - Continuous cardiac monitoring,  monitor vital signs, obtain 12 lead EKG if indicated  - Evaluate effectiveness of antiarrhythmic and heart rate control medications as ordered  - Initiate emergency measures for life threatening arrhythmias  - Monitor electrolytes and administer replacement therapy as ordered  Outcome: Progressing     Problem: SKIN/TISSUE INTEGRITY - ADULT  Goal: Incision(s), wounds(s) or drain site(s) healing without S/S of infection  Description: INTERVENTIONS:  - Assess and document risk factors for pressure ulcer development  - Assess and document skin integrity  - Assess and document dressing/incision, wound bed, drain sites and surrounding tissue  - Implement wound care per orders  - Initiate isolation precautions as appropriate  - Initiate Pressure Ulcer prevention bundle as indicated  Outcome: Progressing

## 2024-11-17 NOTE — PROGRESS NOTES
Progress Note     Davin Hassan Patient Status:  Observation    1955 MRN [id number]   Location [facility] 3W/SW Attending Karen Paniagua MD   Hosp Day # 3 PCP Susana Magallon MD     Chief Complaint: patient presented with No chief complaint on file.      Subjective:   S: Patient denies any cp, no sob, no  n/v    Review of Systems:   10 point ROS completed and was negative, except for pertinent positive and negatives stated in subjective.    Objective:   Vital signs:  Temp:  [98 °F (36.7 °C)-98.3 °F (36.8 °C)] 98.3 °F (36.8 °C)  Pulse:  [58-67] 67  Resp:  [16-18] 16  BP: (116-132)/(58-66) 130/66  SpO2:  [94 %-98 %] 96 %    Wt Readings from Last 6 Encounters:   24 209 lb 8 oz (95 kg)   23 224 lb (101.6 kg)   22 230 lb 6.4 oz (104.5 kg)   22 230 lb (104.3 kg)   22 224 lb (101.6 kg)   19 221 lb (100.2 kg)         Physical Exam:    General: No acute distress. Alert ,         Respiratory: Clear to auscultation bilaterally. No wheezes. No rhonchi.  Cardiovascular: S1, S2. Regular rate and rhythm. No murmurs, rubs or gallops.   Abdomen: Soft, nontender, nondistended.  Positive bowel sounds. No rebound or guarding.  Neurologic: No focal neurological deficits.   Musculoskeletal: Moves all extremities.  Extremities: No edema.    Results:   Diagnostic Data:      Labs:    Labs Last 24 Hours:   BMP     CBC    Other     Na 142 Cl 103 BUN 23 Glu 65   Hb -   PTT - Procal -   K 3.6 CO2 37.0 Cr 0.95   WBC - >< PLT -  INR - CRP -   Renal Lytes Endo    Hct -   Trop - D dim -   eGFR - Ca 9.4 POC Gluc  114    LFT   pBNP - Lactic -   eGFR AA - PO4 - A1c -   AST - APk - Prot -  LDL -     Mg - TSH -   ALT - T paige - Alb -        COVID-19 Lab Results    COVID-19  Lab Results   Component Value Date    COVID19 Detected (A) 2022       Pro-Calcitonin  No results for input(s): \"PCT\" in the last 168 hours.    Cardiac  No results for input(s): \"TROP\", \"PBNP\" in the last 168  hours.    Creatinine Kinase  No results for input(s): \"CK\" in the last 168 hours.    Inflammatory Markers  No results for input(s): \"CRP\", \"MESERET\", \"LDH\", \"DDIMER\" in the last 168 hours.    Imaging: Imaging data reviewed in Epic.    Medications:    torsemide  20 mg Oral Daily    rosuvastatin  40 mg Oral Nightly    insulin aspart  1-7 Units Subcutaneous TID CC and HS    cefTRIAXone  2 g Intravenous Q24H    sennosides  8.6 mg Oral BID    docusate sodium  100 mg Oral BID    insulin degludec  28 Units Subcutaneous Nightly    latanoprost  1 drop Both Eyes Nightly    vancomycin  15 mg/kg Intravenous Q24H    heparin  5,000 Units Subcutaneous 2 times per day    aspirin  81 mg Oral Daily     MRI FOOT   There is a fluid collection along the dorsal aspect of the 5th metatarsophalangeal joint measuring approximately 3 x 3 x 1 cm, with surrounding inflammation.  The collection may represent abscess , and the surrounding soft tissue inflammation suggests   cellulitis.  There are signal changes within the 5th metatarsal head as well as the 5th proximal and middle phalanxes, compatible with early osteomyelitis       Assessment & Plan:   ASSESSMENT / PLAN:     Problem List Items Addressed This Visit          Health Encounters    Pre-op testing - Primary    Relevant Orders    Basic Metabolic Panel (8) (Completed)    CBC, Platelet; No Differential (Completed)    XR CHEST PA + LAT CHEST (Completed)     Other Visit Diagnoses       Abnormal nuclear stress test        Relevant Medications    aspirin 81 MG Oral Tab EC    atorvastatin 80 MG Oral Tab    metoprolol tartrate 25 MG Oral Tab    heparin (Porcine) 5000 UNIT/ML injection 5,000 Units    aspirin DR tab 81 mg    torsemide (Demadex) tab 20 mg    rosuvastatin (Crestor) tab 40 mg    Other Relevant Orders    CATH LEFT AND RIGHT CATH W/INTERVENTION (Completed)    Abnormal echocardiogram        Relevant Medications    aspirin 81 MG Oral Tab EC    atorvastatin 80 MG Oral Tab    metoprolol  tartrate 25 MG Oral Tab    heparin (Porcine) 5000 UNIT/ML injection 5,000 Units    aspirin  tab 81 mg    magnesium hydroxide (Milk of Magnesia) 400 MG/5ML oral suspension 30 mL    torsemide (Demadex) tab 20 mg    rosuvastatin (Crestor) tab 40 mg    Other Relevant Orders    CATH LEFT AND RIGHT CATH W/INTERVENTION (Completed)              67 y/o male with Pmhx of Insulin-dependent diabetes mellitus type 2, hyperlipidemia, peripheral diabetic neuropathy, glaucoma, and peripheral arterial disease.he was evaluated by Cardiology and had a stress test which was abnormal.  Today, he had a cardiac angiogram done by his cardiologist, Dr. Barone, which showed reportedly multivessel coronary artery disease.  Patient will be admitted to the hospital for further cardiovascular surgery evaluation.       Multivessel CAD  CHF-Stable   -Cardiology and Cardiothoracic surgery consulted   -on PO Torsemide,   -plan CABG once infection is cleared       LLE cellulitis with fifth toe ulceration  -ID following   -IV vanc and zosyn   -MRI -Suspicious for osteo in 5th toe   -Podiatry consulted -Pt follows with Dr Bowen Jeffries -consulted however they are on staff, consulted Dr Dow (podiatry on call)    DM  SSI     Quality:  DVT Prophylaxis: heparin  CODE status: FULL   DISPO: pending clinical improvement.   Estimated date of discharge: To be determined  Discharge is dependent on: Improved clinical status  At this point Patient is expected to be discharge to: Home versus rehab      Plan of care discussed with Patient and RN.     Coordinated care with providers and counseling re: treatment plan and workup     Karen Paniagua MD    Supplementary Documentation:         **Certification      PHYSICIAN Certification of Need for Inpatient Hospitalization - Initial Certification    Patient will require inpatient services that will reasonably be expected to span two midnight's based on the clinical documentation in H+P.   Based on patients  current state of illness, I anticipate that, after discharge, patient will require TBD.             I personally reviewed the available laboratories, imaging including operative report. I discussed/will discuss the case with patient and her nurse. I ordered laboratories studies. I adjusted medications including not applicable today. Medical decision making high, risk is high.     >55min spent, >50% spent counseling and coordinating care in the form of educating pt/family and d/w consultants and staff. Most of the time spent discussing the above plan.

## 2024-11-17 NOTE — PROGRESS NOTES
Cardiology Progress Note    Davin Hassan Patient Status:  Inpatient    1955 MRN [id number]   Location [facility] 3W/SW Attending Karen Paniagua MD   Hosp Day # 3 PCP Susana Magallon MD     Interval Note:  Patient seen and examined  Feels well, no chest pain or shortness of breath  Foot MRI suggests early osteomyelitis      --------------------------------------------------------------------------------------------------------------------------------  ROS 12 systems reviewed, pertinent findings above.  ROS    History:  Past Medical History:    Alternating esotropia    Alternating esotropia    BDR (background diabetic retinopathy) ([facility])    under the care of Dr. Brito- see progress note    Colon adenomas    x4    Diabetic macular edema of left eye ([facility])    S/P Eylea injection OS x 5 with Dr. Brito    Glaucoma suspect of both eyes    Primary open angle glaucoma (POAG) of left eye, mild stage    19 start Latanoprost qhs OS due to thinning of the optic nerve on OCT    Type II or unspecified type diabetes mellitus without mention of complication, not stated as uncontrolled     Past Surgical History:   Procedure Laterality Date    Colonoscopy  2008    Colonoscopy  2023    Strabismus surgery Bilateral      Family History   Problem Relation Age of Onset    Diabetes Father     Glaucoma Father     Macular degeneration Father     Breast Cancer Mother     Heart Disorder Brother       reports that he has never smoked. He has never used smokeless tobacco. He reports current alcohol use of about 1.0 standard drink of alcohol per week. He reports that he does not use drugs.    Objective:   Temp: 98.3 °F (36.8 °C)  Pulse: 59  Resp: 18  BP: 119/58    Intake/Output:     Intake/Output Summary (Last 24 hours) at 2024 1613  Last data filed at 2024 1200  Gross per 24 hour   Intake 620 ml   Output 3175 ml   Net -2555 ml       Physical Exam:    General: aox3  HEENT:  Normocephalic, anicteric sclera, neck supple.  Neck: No JVD, carotids 2+, no bruits.  Cardiac: Regular rate and rhythm. S1, S2 normal. No murmur, pericardial rub, S3.  Lungs: Clear without wheezes, rales, rhonchi or dullness.  Normal excursions and effort.  Abdomen: Soft, non-tender. BS-present.  Extremities: Without clubbing, cyanosis or edema.  Peripheral pulses are 2+.  Neurologic: Non-focal  Skin: Warm and dry.       Assessment   Severe multivessel coronary artery disease  Left lower extremity cellulitis, toe ulceration with early 5th metatarsal osteomyelitis  Diabetes  Diabetic neuropathy  Peripheral arterial disease    Plan  - Foot MRI pending  - JAYJAY reviewed, decent pressure waveforms-hold off on peripheral angiogram for time being.  If concern for ischemic etiology then can consider TBI versus peripheral angiogram  -Patient eval by CT surgery-plan for CABG once cleared from infection standpoint  - continue asa statin, torsemide    Thank you for allowing me to take part in the care of Davin Hassan. Please call with any questions of concerns.      Level of care: L3    Jose Foote DO  [facility] [place]   Interventional Cardiac and Vascular Services      Jose Foote DO  November 17, 2024  4:14 PM

## 2024-11-18 ENCOUNTER — APPOINTMENT (OUTPATIENT)
Dept: CT IMAGING | Facility: HOSPITAL | Age: 69
DRG: 628 | End: 2024-11-18
Attending: INTERNAL MEDICINE
Payer: MEDICARE

## 2024-11-18 ENCOUNTER — MED REC SCAN ONLY (OUTPATIENT)
Dept: INTERNAL MEDICINE CLINIC | Facility: CLINIC | Age: 69
End: 2024-11-18

## 2024-11-18 ENCOUNTER — APPOINTMENT (OUTPATIENT)
Dept: CT IMAGING | Facility: HOSPITAL | Age: 69
End: 2024-11-18
Attending: INTERNAL MEDICINE
Payer: MEDICARE

## 2024-11-18 PROBLEM — M86.171 ACUTE OSTEOMYELITIS OF RIGHT FOOT (HCC): Status: ACTIVE | Noted: 2024-01-01

## 2024-11-18 PROBLEM — M86.171 ACUTE OSTEOMYELITIS OF RIGHT FOOT (HCC): Status: ACTIVE | Noted: 2024-11-18

## 2024-11-18 PROBLEM — M86.9 OSTEOMYELITIS OF FIFTH TOE OF LEFT FOOT (HCC): Status: ACTIVE | Noted: 2024-11-18

## 2024-11-18 PROBLEM — M86.9 OSTEOMYELITIS OF FIFTH TOE OF LEFT FOOT (HCC): Status: ACTIVE | Noted: 2024-01-01

## 2024-11-18 LAB
ANION GAP SERPL CALC-SCNC: 2 MMOL/L (ref 0–18)
BASOPHILS # BLD AUTO: 0.02 X10(3) UL (ref 0–0.2)
BASOPHILS NFR BLD AUTO: 0.3 %
BUN BLD-MCNC: 25 MG/DL (ref 9–23)
BUN/CREAT SERPL: 23.6 (ref 10–20)
CALCIUM BLD-MCNC: 9.8 MG/DL (ref 8.7–10.4)
CHLORIDE SERPL-SCNC: 103 MMOL/L (ref 98–112)
CO2 SERPL-SCNC: 37 MMOL/L (ref 21–32)
CREAT BLD-MCNC: 1.06 MG/DL
DEPRECATED RDW RBC AUTO: 44.3 FL (ref 35.1–46.3)
EGFRCR SERPLBLD CKD-EPI 2021: 77 ML/MIN/1.73M2 (ref 60–?)
EOSINOPHIL # BLD AUTO: 0.17 X10(3) UL (ref 0–0.7)
EOSINOPHIL NFR BLD AUTO: 2.2 %
ERYTHROCYTE [DISTWIDTH] IN BLOOD BY AUTOMATED COUNT: 12.1 % (ref 11–15)
GLUCOSE BLD-MCNC: 161 MG/DL (ref 70–99)
GLUCOSE BLDC GLUCOMTR-MCNC: 160 MG/DL (ref 70–99)
GLUCOSE BLDC GLUCOMTR-MCNC: 189 MG/DL (ref 70–99)
GLUCOSE BLDC GLUCOMTR-MCNC: 318 MG/DL (ref 70–99)
HCT VFR BLD AUTO: 37.1 %
HGB BLD-MCNC: 11.9 G/DL
IMM GRANULOCYTES # BLD AUTO: 0.05 X10(3) UL (ref 0–1)
IMM GRANULOCYTES NFR BLD: 0.6 %
LYMPHOCYTES # BLD AUTO: 1.23 X10(3) UL (ref 1–4)
LYMPHOCYTES NFR BLD AUTO: 15.9 %
MCH RBC QN AUTO: 31.9 PG (ref 26–34)
MCHC RBC AUTO-ENTMCNC: 32.1 G/DL (ref 31–37)
MCV RBC AUTO: 99.5 FL
MONOCYTES # BLD AUTO: 0.65 X10(3) UL (ref 0.1–1)
MONOCYTES NFR BLD AUTO: 8.4 %
NEUTROPHILS # BLD AUTO: 5.64 X10 (3) UL (ref 1.5–7.7)
NEUTROPHILS # BLD AUTO: 5.64 X10(3) UL (ref 1.5–7.7)
NEUTROPHILS NFR BLD AUTO: 72.6 %
OSMOLALITY SERPL CALC.SUM OF ELEC: 302 MOSM/KG (ref 275–295)
PLATELET # BLD AUTO: 262 10(3)UL (ref 150–450)
POTASSIUM SERPL-SCNC: 4.1 MMOL/L (ref 3.5–5.1)
RBC # BLD AUTO: 3.73 X10(6)UL
SODIUM SERPL-SCNC: 142 MMOL/L (ref 136–145)
VANCOMYCIN SERPL-MCNC: 15.4 UG/ML (ref ?–40)
WBC # BLD AUTO: 7.8 X10(3) UL (ref 4–11)

## 2024-11-18 PROCEDURE — 99233 SBSQ HOSP IP/OBS HIGH 50: CPT | Performed by: INTERNAL MEDICINE

## 2024-11-18 PROCEDURE — 99223 1ST HOSP IP/OBS HIGH 75: CPT | Performed by: PODIATRIST

## 2024-11-18 PROCEDURE — 75635 CT ANGIO ABDOMINAL ARTERIES: CPT | Performed by: INTERNAL MEDICINE

## 2024-11-18 RX ORDER — VANCOMYCIN HYDROCHLORIDE
1500
Status: DISCONTINUED | OUTPATIENT
Start: 2024-11-18 | End: 2024-11-20

## 2024-11-18 RX ADMIN — TORSEMIDE 20 MG: 20 TABLET ORAL at 09:46:00

## 2024-11-18 RX ADMIN — ROSUVASTATIN CALCIUM 40 MG: 20 TABLET, COATED ORAL at 21:47:00

## 2024-11-18 RX ADMIN — DOCUSATE SODIUM 100 MG: 100 CAPSULE, LIQUID FILLED ORAL at 21:47:00

## 2024-11-18 RX ADMIN — LATANOPROST 1 DROP: 50 SOLUTION/ DROPS OPHTHALMIC at 21:47:00

## 2024-11-18 RX ADMIN — HEPARIN SODIUM 5000 UNITS: 5000 INJECTION, SOLUTION INTRAVENOUS; SUBCUTANEOUS at 21:47:00

## 2024-11-18 RX ADMIN — SENNOSIDES 8.6 MG: 8.6 MG TABLET ORAL at 21:47:00

## 2024-11-18 RX ADMIN — VANCOMYCIN HYDROCHLORIDE 1500 MG: at 12:30:00

## 2024-11-18 RX ADMIN — ASPIRIN 81 MG: 81 TABLET ORAL at 09:46:00

## 2024-11-18 RX ADMIN — HEPARIN SODIUM 5000 UNITS: 5000 INJECTION, SOLUTION INTRAVENOUS; SUBCUTANEOUS at 09:46:00

## 2024-11-18 RX ADMIN — INSULIN DEGLUDEC 25 UNITS: 100 INJECTION, SOLUTION SUBCUTANEOUS at 21:47:00

## 2024-11-18 NOTE — PLAN OF CARE
Problem: Diabetes/Glucose Control  Goal: Glucose maintained within prescribed range  Description: INTERVENTIONS:  - Monitor Blood Glucose as ordered  - Assess for signs and symptoms of hyperglycemia and hypoglycemia  - Administer ordered medications to maintain glucose within target range  - Assess barriers to adequate nutritional intake and initiate nutrition consult as needed  - Instruct patient on self management of diabetes  Outcome: Progressing     Problem: CARDIOVASCULAR - ADULT  Goal: Maintains optimal cardiac output and hemodynamic stability  Description: INTERVENTIONS:  - Monitor vital signs, rhythm, and trends  - Monitor for bleeding, hypotension and signs of decreased cardiac output  - Evaluate effectiveness of vasoactive medications to optimize hemodynamic stability  - Monitor arterial and/or venous puncture sites for bleeding and/or hematoma  - Assess quality of pulses, skin color and temperature  - Assess for signs of decreased coronary artery perfusion - ex. Angina  - Evaluate fluid balance, assess for edema, trend weights  Outcome: Progressing  Goal: Absence of cardiac arrhythmias or at baseline  Description: INTERVENTIONS:  - Continuous cardiac monitoring, monitor vital signs, obtain 12 lead EKG if indicated  - Evaluate effectiveness of antiarrhythmic and heart rate control medications as ordered  - Initiate emergency measures for life threatening arrhythmias  - Monitor electrolytes and administer replacement therapy as ordered  Outcome: Progressing     Problem: SKIN/TISSUE INTEGRITY - ADULT  Goal: Incision(s), wounds(s) or drain site(s) healing without S/S of infection  Description: INTERVENTIONS:  - Assess and document risk factors for pressure ulcer development  - Assess and document skin integrity  - Assess and document dressing/incision, wound bed, drain sites and surrounding tissue  - Implement wound care per orders  - Initiate isolation precautions as appropriate  - Initiate Pressure Ulcer  prevention bundle as indicated  Outcome: Progressing     Pt comfortable, denies pain, denies SOB  Left toe painted with betadine and covered with dry dressing  IV abx continued  MRI resulted Osteomyelitis - podiatry consulted

## 2024-11-18 NOTE — PROGRESS NOTES
Progress Note  Davin Hassan Patient Status:  Inpatient    1955 MRN [id number]   Location [facility] 3W/SW Attending Sofia Boyce MD   Hosp Day # 4 PCP Susana Magallon MD     SUBJECTIVE:    No complaints.     VITALS:  /62 (BP Location: Right arm)   Pulse 62   Temp 98 °F (36.7 °C) (Oral)   Resp 18   Ht 6' 1\" (1.854 m)   Wt 208 lb 1.6 oz (94.4 kg)   SpO2 96%   BMI 27.46 kg/m²     INTAKE/OUTPUT:    Intake/Output Summary (Last 24 hours) at 2024 1500  Last data filed at 2024 1452  Gross per 24 hour   Intake 1080 ml   Output 3775 ml   Net -2695 ml     Last 3 Weights   24 0503 208 lb 1.6 oz (94.4 kg)   24 0317 209 lb 8 oz (95 kg)   24 0529 213 lb 9.6 oz (96.9 kg)   11/15/24 0502 221 lb 3.2 oz (100.3 kg)   24 1334 229 lb (103.9 kg)   24 1128 229 lb (103.9 kg)   24 1740 230 lb (104.3 kg)   10/24/24 1609 210 lb (95.3 kg)   23 0810 224 lb (101.6 kg)   22 1538 230 lb 6.4 oz (104.5 kg)     LABS:  Recent Labs   Lab 11/15/24  0628 24  0649 24  0501   * 65* 161*   BUN 22 23 25*   CREATSERUM 1.02 0.95 1.06   EGFRCR 81 88 77   CA 9.9 9.4 9.8    142 142   K 4.2 3.6 4.1    103 103   CO2 38.0* 37.0* 37.0*     Recent Labs   Lab 24  1301 11/15/24  0628 24  0501   RBC 3.43* 3.36* 3.73*   HGB 11.1* 10.8* 11.9*   HCT 34.6* 33.2* 37.1*   .9* 98.8 99.5   MCH 32.4 32.1 31.9   MCHC 32.1 32.5 32.1   RDW 12.0 12.2 12.1   NEPRELIM  --  6.98 5.64   WBC 11.2* 9.5 7.8   .0 261.0 262.0     No results for input(s): \"TROP\", \"CK\" in the last 168 hours.    DIAGNOSTICS:    TELEMETRY: SB/SR      ECHO 10/10/2024:  1.The left ventricle is normal in size. The left ventricular ejection fraction is 45%. Regional wall motion analysis shows hypokinesis of mid anterolateral segment and apical lateral segment. Wall motion score index is 2. The left ventricle diastolic function is impaired (Grade II) with an  elevated left atrial pressure.  Wall thickness is within normal limits.  2.The right ventricle is normal in size. Right ventricular systolic function is normal.  3.The left atrium is mildly enlarged based on the left atrium volume index of 40.2ml/m².  4.Mitral regurgitation is noted. Trace mitral regurgitation.  5.The study quality is below average.     ROS: Negative unless noted above     PHYSICAL EXAM:  General: Alert and oriented x 3. No apparent distress.  HEENT: Normocephalic, sclera are nonicteric. Hearing appropriate bilaterally.  Neck: No JVD or Carotid bruits. Trachea midline.   Cardiac: Regular rate and rhythm. S1, S2 auscultated. No murmurs, rubs, or gallops appreciated.   Lungs: Clear without wheezes, rales, rhonchi or dullness. Chest expansion symmetrical. Regular effort.  Abdomen: Soft, non-tender, +BS. No hepatosplenomegaly or appreciable masses.   Extremities: Without clubbing, cyanosis or edema. Peripheral pulses are 2+.  Neurologic: Motor and sensory nerves grossly intact.   Psych: Appropriate affect   Skin: Warm and dry. Rt wrist soft w/o hematoma or bruit or bruising     MEDICATIONS:   vancomycin  1,500 mg Intravenous Q18H    insulin degludec  25 Units Subcutaneous Nightly    torsemide  20 mg Oral Daily    rosuvastatin  40 mg Oral Nightly    insulin aspart  1-7 Units Subcutaneous TID CC and HS    cefTRIAXone  2 g Intravenous Q24H    sennosides  8.6 mg Oral BID    docusate sodium  100 mg Oral BID    latanoprost  1 drop Both Eyes Nightly    heparin  5,000 Units Subcutaneous 2 times per day    aspirin  81 mg Oral Daily     Assessment   Severe multivessel coronary artery disease    Left lower extremity cellulitis, toe ulceration with early 5th metatarsal osteomyelitis, evaluated by ID and podiatry, c/w with abscess with OM     LV Dysfunction   Diabetes  Diabetic neuropathy  Peripheral arterial disease     Plan  - Bone biopsy tomorrow with podiatry at 1800, ID also following   - JAYJAY reviewed, arennt  pressure waveforms-hold off on peripheral angiogram for time being. If concern for ischemic etiology then can consider TBI versus peripheral angiogram  -Patient eval by CT surgery-plan for CABG once cleared from infection standpoint  - Continue asa statin, torsemide. No BB with underlying bradycardia   - CV surgery timing TBD once infection is cleared   - Compensated on exam, eventual ACE/ARB/ARNI     Plan of care discussed with patient and RN.     Cori Rico, APRN  11/18/2024  3:00 PM  [phone] ([place])  [phone] ([place])

## 2024-11-18 NOTE — PAYOR COMM NOTE
--------------  CONTINUED STAY REVIEW    Payor: [employer] AD[employer] PPO  Subscriber #:  [id number]  Authorization Number: [id number]    Admit date: 11/14/24  Admit time: 11:27 AM     HISTORY AND PHYSICAL EXAMINATION     CHIEF COMPLAINT:  Multivessel coronary artery disease, left lower extremity cellulitis, and fluid overload status.     HISTORY OF PRESENT ILLNESS:  Patient is a 68-year-old  male who was on a trip with his family to [facility], [place], last week.  He noted that he has orthopnea, dyspnea on exertion, and developed cellulitis in his left foot and distal left leg.  He was seen in a hospital over there and started on antibiotics and diuretics.  Upon coming back to the [place], he was evaluated by Cardiology and had a stress test which was abnormal.  Today, he had a cardiac angiogram done by his cardiologist, Dr. Barone, which showed reportedly multivessel coronary artery disease.  Patient will be admitted to the hospital for further cardiovascular surgery evaluation.      PAST MEDICAL HISTORY:  Insulin-dependent diabetes mellitus type 2, hyperlipidemia, peripheral diabetic neuropathy, glaucoma, and peripheral arterial disease.     PAST SURGICAL HISTORY:  None.   REVIEW OF SYSTEMS:  Patient said he had erythema in his left leg and left foot since he was walking around in [place], and he was started on antibiotics with some improvement.  Also has been noticing dyspnea on exertion, orthopnea, and some chest pressure with physical activity in the last 2 to 3 weeks.  He denies any fever or chills.  He does have a callus that broke off on the fifth toe on the left side recently but now no drainage.          PHYSICAL EXAMINATION:    GENERAL:  Alert and oriented to time, place and person.    VITAL SIGNS:  Temperature 96.0, pulse 60, respiratory rate 22, blood pressure 106/57, pulse ox 97% on room air.  HEENT:  Atraumatic.  Oropharynx clear.  Dry mucous membranes.    NECK:  Supple.  No lymphadenopathy.   Trachea midline.  Full range of motion.   LUNGS:  Clear to auscultation bilaterally.  Normal respiratory effort.    HEART:  Regular rate and rhythm.  S1 and S2 auscultated.  No murmur.    ABDOMEN:  Soft, nondistended.  No tenderness.  Positive bowel sounds.   EXTREMITIES:  There is +1 edema, both legs.  Left foot and left leg erythema.  No open wounds.  Left fifth toe with callus formation on the dorsum and lateral aspect with no drainage.  NEUROLOGIC:  Decreased sensation to light touch in both feet.  Otherwise, no focal findings.     ASSESSMENT:    1.       Multivessel coronary artery disease, to be evaluated by Cardiovascular Surgery for coronary artery bypass graft surgery.  2.       Fluid overload status and possible heart failure.    3.       Left lower extremity cellulitis.  Rule out left fifth toe osteomyelitis.  4.       Insulin-dependent diabetes mellitus type 2.     PLAN:  Patient will be started on IV Lasix.  IV vancomycin.  Obtain MRI scan of the left foot.  Cardiology, cardiovascular surgery, and infectious disease consults.  Monitor Accu-Cheks.  Further recommendations to follow.              11/15/24        Temp:  [97.9 °F (36.6 °C)-98.3 °F (36.8 °C)] 97.9 °F (36.6 °C)  Pulse:  [51-78] 78  Resp:  [17-22] 18  BP: (106-157)/(57-80) 121/68  SpO2:  [93 %-97 %] 96 %     Physical Exam:    General: Alert         Respiratory: Clear to auscultation bilaterally. No wheezes. No rhonchi.  Cardiovascular: S1, S2. Regular rate and rhythm. No murmurs, rubs or gallops.   Abdomen: Soft, nontender, nondistended.  Positive bowel sounds. No rebound or guarding.  Neurologic: No focal neurological deficits.   Musculoskeletal: Moves all extremities.  Extremities: No edema.    Labs Last 24 Hours:    BMP         CBC     Na 141 Cl 101 BUN 22 Glu 134     Hb 10.8     K 4.2 CO2 38.0 Cr 1.02     WBC 9.5 >< .0    Lytes Endo       Hct 33.2      Ca 9.9 POC Gluc  111           PO4 - A1c 8.9               Medications:     cefTRIAXone  2 g Intravenous Q24H    aspirin  324 mg Oral Once    insulin degludec  28 Units Subcutaneous Nightly    latanoprost  1 drop Both Eyes Nightly    insulin aspart  1-7 Units Subcutaneous TID CC    vancomycin  15 mg/kg Intravenous Q24H    heparin  5,000 Units Subcutaneous 2 times per day    furosemide  40 mg Intravenous BID (Diuretic)    aspirin  81 mg Oral Daily       Assessment & Plan:   69 y/o male with Pmhx of Insulin-dependent diabetes mellitus type 2, hyperlipidemia, peripheral diabetic neuropathy, glaucoma, and peripheral arterial disease.he was evaluated by Cardiology and had a stress test which was abnormal.  Today, he had a cardiac angiogram done by his cardiologist, Dr. Barone, which showed reportedly multivessel coronary artery disease.  Patient will be admitted to the hospital for further cardiovascular surgery evaluation.       Multivessel CAD  CHF  -Cardiology and Cardiothoracic surgery consulted   -Lasix daily      LLE cellulitis with fifth toe ulceration  -ID following   -IV vanc and zosyn   -MRI pending     DM  SSI                     CARDIOLOGY  Assessment/Plan:     MV CAD  Cellulitis  Ischemic cardiomyopathy with LVEF 45%  Diabetes  Hypertension  Hyperlipidemia    Euvolemic on exam  Cont with IV lasix  CT surgery and ID on board. Will need surgical revascularization  Plan for MRI leg today         11/16/24        CARDIOLOGY  Interval Note:  Feels well, no chest pain or shortness of breath since catheterization  Awaiting foot MRI to assess for osteomyelitis     Temp: 98.3 °F (36.8 °C)  Pulse: 59  Resp: 17  BP: 116/58     General: aox3  HEENT: Normocephalic, anicteric sclera, neck supple.  Neck: No JVD, carotids 2+, no bruits.  Cardiac: Regular rate and rhythm. S1, S2 normal. No murmur, pericardial rub, S3.  Lungs: Clear without wheezes, rales, rhonchi or dullness.  Normal excursions and effort.  Abdomen: Soft, non-tender. BS-present.  Extremities: Without clubbing, cyanosis or edema.   Peripheral pulses are 2+.  Neurologic: Non-focal  Skin: Warm and dry.       Assessment   Severe multivessel coronary artery disease  Diabetes  Diabetic neuropathy  Left lower extremity cellulitis, toe ulceration  Peripheral arterial disease     Plan  - Foot MRI pending  - JAYJAY reviewed, decent pressure waveforms-hold off on peripheral angiogram for time being.  If concern for ischemic etiology then can consider TBI versus peripheral angiogram  -Patient eval by CT surgery-plan for CABG once cleared from infection standpoint  -Switch off IV diuretics and start torsemide 20 mg daily  Continue aspirin, start statin                    HOSPITALIST  Medications:    cefTRIAXone  2 g Intravenous Q24H    sennosides  8.6 mg Oral BID    docusate sodium  100 mg Oral BID    aspirin  324 mg Oral Once    insulin degludec  28 Units Subcutaneous Nightly    latanoprost  1 drop Both Eyes Nightly    insulin aspart  1-7 Units Subcutaneous TID CC    vancomycin  15 mg/kg Intravenous Q24H    heparin  5,000 Units Subcutaneous 2 times per day    furosemide  40 mg Intravenous BID (Diuretic)    aspirin  81 mg Oral Daily       Assessment & Plan:   67 y/o male with Pmhx of Insulin-dependent diabetes mellitus type 2, hyperlipidemia, peripheral diabetic neuropathy, glaucoma, and peripheral arterial disease.he was evaluated by Cardiology and had a stress test which was abnormal.  Today, he had a cardiac angiogram done by his cardiologist, Dr. Barone, which showed reportedly multivessel coronary artery disease.  Patient will be admitted to the hospital for further cardiovascular surgery evaluation.       Multivessel CAD  CHF  -Cardiology and Cardiothoracic surgery consulted   -Lasix daily        LLE cellulitis with fifth toe ulceration  -ID following   -IV vanc and zosyn   -MRI pending     DM  SSI      Quality:  DVT Prophylaxis: heparin      11/17/24     Temp:  [98 °F (36.7 °C)-98.3 °F (36.8 °C)] 98.3 °F (36.8 °C)  Pulse:  [58-67] 67  Resp:  [16-18]  16  BP: (116-132)/(58-66) 130/66  SpO2:  [94 %-98 %] 96 %     Physical Exam:    General:  Alert        Respiratory: Clear to auscultation bilaterally. No wheezes. No rhonchi.  Cardiovascular: S1, S2. Regular rate and rhythm. No murmurs, rubs or gallops.   Abdomen: Soft, nontender, nondistended.  Positive bowel sounds. No rebound or guarding.  Neurologic: No focal neurological deficits.   Musculoskeletal: Moves all extremities.  Extremities: No edema.     Labs Last 24 Hours:    BMP         Na 142 Cl 103 BUN 23 Glu 65     K 3.6 CO2 37.0 Cr 0.95        Lytes Endo        Ca 9.4 POC Gluc  114             Medications:    torsemide  20 mg Oral Daily    rosuvastatin  40 mg Oral Nightly    insulin aspart  1-7 Units Subcutaneous TID CC and HS    cefTRIAXone  2 g Intravenous Q24H    sennosides  8.6 mg Oral BID    docusate sodium  100 mg Oral BID    insulin degludec  28 Units Subcutaneous Nightly    latanoprost  1 drop Both Eyes Nightly    vancomycin  15 mg/kg Intravenous Q24H    heparin  5,000 Units Subcutaneous 2 times per day    aspirin  81 mg Oral Daily      MRI FOOT   There is a fluid collection along the dorsal aspect of the 5th metatarsophalangeal joint measuring approximately 3 x 3 x 1 cm, with surrounding inflammation.  The collection may represent abscess , and the surrounding soft tissue inflammation suggests   cellulitis.  There are signal changes within the 5th metatarsal head as well as the 5th proximal and middle phalanxes, compatible with early osteomyelitis       Assessment & Plan:   67 y/o male with Pmhx of Insulin-dependent diabetes mellitus type 2, hyperlipidemia, peripheral diabetic neuropathy, glaucoma, and peripheral arterial disease.he was evaluated by Cardiology and had a stress test which was abnormal.  Today, he had a cardiac angiogram done by his cardiologist, Dr. Barone, which showed reportedly multivessel coronary artery disease.  Patient will be admitted to the hospital for further cardiovascular  surgery evaluation.       Multivessel CAD  CHF-Stable   -Cardiology and Cardiothoracic surgery consulted   -on PO Torsemide,   -plan CABG once infection is cleared       LLE cellulitis with fifth toe ulceration  -ID following   -IV vanc and zosyn   -MRI -Suspicious for osteo in 5th toe   -Podiatry consulted -Pt follows with Dr Bowen Jeffries -consulted however they are on staff, consulted Dr Dow (podiatry on call)     DM  SSI      Quality:  DVT Prophylaxis: heparin               MEDICATIONS ADMINISTERED IN LAST 1 DAY:  aspirin  tab 81 mg       Date Action Dose Route User    11/18/2024 0946 Given 81 mg Oral Yessica Lopez RN          cefTRIAXone (Rocephin) 2 g in sodium chloride 0.9% 100 mL IVPB-ADDV       Date Action Dose Route User    11/18/2024 0946 New Bag 2 g Intravenous Yessica Lopez RN          heparin (Porcine) 5000 UNIT/ML injection 5,000 Units       Date Action Dose Route User    11/18/2024 0946 Given 5,000 Units Subcutaneous (Right Lower Abdomen) Yessica Lopez RN    11/17/2024 2128 Given 5,000 Units Subcutaneous (Right Upper Arm) Juliana Sadler RN          insulin aspart (NovoLOG) 100 Units/mL FlexPen 1-7 Units       Date Action Dose Route User    11/17/2024 1140 Given 3 Units Subcutaneous (Left Upper Arm) Evie Cole RN          insulin degludec (Tresiba) 100 units/mL flextouch 25 Units       Date Action Dose Route User    11/17/2024 2128 Given 25 Units Subcutaneous (Right Upper Arm) Juliana Sadler RN     latanoprost (Xalatan) 0.005 % ophthalmic solution 1 drop       Date Action Dose Route User    11/17/2024 2128 Given 1 drop Both Eyes Juliana Sadler RN          rosuvastatin (Crestor) tab 40 mg       Date Action Dose Route User    11/17/2024 2127 Given 40 mg Oral Juliana Sadler RN          torsemide (Demadex) tab 20 mg       Date Action Dose Route User    11/18/2024 0946 Given 20 mg Oral Yessica Lopez RN          vancomycin (Vancocin) 1.5 g in sodium chloride 0.9% 250mL  IVPB premix       Date Action Dose Route User    11/17/2024 1539 New Bag 1,500 mg Intravenous Evie Cole, RN            Vitals (last day)       Date/Time Temp Pulse Resp BP SpO2 Weight O2 Device O2 Flow Rate (L/min) Who    11/18/24 0944 98.3 °F (36.8 °C) 63 18 125/65 96 % -- None (Room air) -- AR    11/18/24 0503 98.4 °F (36.9 °C) 59 16 117/69 96 % 208 lb 1.6 oz (94.4 kg) None (Room air) -- ML    11/17/24 1540 98.3 °F (36.8 °C) 59 18 119/58 96 % -- None (Room air) -- LP    11/17/24 0900 98.3 °F (36.8 °C) 67 16 130/66 96 % -- None (Room air) -- LP    11/17/24 0358 98.1 °F (36.7 °C) 61 18 121/61 94 % -- None (Room air) -- SS

## 2024-11-18 NOTE — PLAN OF CARE
Aox4. Room air. Self. L foot dressing intact, IV antibiotics for early osteomyelitis.    Problem: SKIN/TISSUE INTEGRITY - ADULT  Goal: Incision(s), wounds(s) or drain site(s) healing without S/S of infection  Description: INTERVENTIONS:  - Assess and document risk factors for pressure ulcer development  - Assess and document skin integrity  - Assess and document dressing/incision, wound bed, drain sites and surrounding tissue  - Implement wound care per orders  - Initiate isolation precautions as appropriate  - Initiate Pressure Ulcer prevention bundle as indicated  Outcome: Progressing     Problem: Diabetes/Glucose Control  Goal: Glucose maintained within prescribed range  Description: INTERVENTIONS:  - Monitor Blood Glucose as ordered  - Assess for signs and symptoms of hyperglycemia and hypoglycemia  - Administer ordered medications to maintain glucose within target range  - Assess barriers to adequate nutritional intake and initiate nutrition consult as needed  - Instruct patient on self management of diabetes  Outcome: Progressing

## 2024-11-18 NOTE — PLAN OF CARE
Patient is alert and oriented. Patient denies any pain. Dressing was changed. Antibiotics were given as ordered.  Patient to have biopsy with podiatry tomorrow at 1800. Per MD Dow, patient ok to eat breakfast tomorrow but needs to be NPO by 0800. Safety measures are in place.     Problem: Diabetes/Glucose Control  Goal: Glucose maintained within prescribed range  Description: INTERVENTIONS:  - Monitor Blood Glucose as ordered  - Assess for signs and symptoms of hyperglycemia and hypoglycemia  - Administer ordered medications to maintain glucose within target range  - Assess barriers to adequate nutritional intake and initiate nutrition consult as needed  - Instruct patient on self management of diabetes  Outcome: Progressing     Problem: Patient Centered Care  Goal: Patient preferences are identified and integrated in the patient's plan of care  Description: Interventions:  - What would you like us to know as we care for you? I'm from home with my wife  - Provide timely, complete, and accurate information to patient/family  - Incorporate patient and family knowledge, values, beliefs, and cultural backgrounds into the planning and delivery of care  - Encourage patient/family to participate in care and decision-making at the level they choose  - Honor patient and family perspectives and choices  Outcome: Progressing     Problem: Patient/Family Goals  Goal: Patient/Family Long Term Goal  Description: Patient's Long Term Goal: discharge home    Interventions:  - Monitor vitals, labs, imaging  - Tele  - Cards, podiatry, ID on consult  - See additional Care Plan goals for specific interventions  Outcome: Progressing  Goal: Patient/Family Short Term Goal  Description: Patient's Short Term Goal: manage pain    Interventions:   - Frequent pain assessments  - PRN pain meds  - non-pharmacological interventions  - See additional Care Plan goals for specific interventions  Outcome: Progressing     Problem: CARDIOVASCULAR -  ADULT  Goal: Maintains optimal cardiac output and hemodynamic stability  Description: INTERVENTIONS:  - Monitor vital signs, rhythm, and trends  - Monitor for bleeding, hypotension and signs of decreased cardiac output  - Evaluate effectiveness of vasoactive medications to optimize hemodynamic stability  - Monitor arterial and/or venous puncture sites for bleeding and/or hematoma  - Assess quality of pulses, skin color and temperature  - Assess for signs of decreased coronary artery perfusion - ex. Angina  - Evaluate fluid balance, assess for edema, trend weights  Outcome: Progressing  Goal: Absence of cardiac arrhythmias or at baseline  Description: INTERVENTIONS:  - Continuous cardiac monitoring, monitor vital signs, obtain 12 lead EKG if indicated  - Evaluate effectiveness of antiarrhythmic and heart rate control medications as ordered  - Initiate emergency measures for life threatening arrhythmias  - Monitor electrolytes and administer replacement therapy as ordered  Outcome: Progressing     Problem: SKIN/TISSUE INTEGRITY - ADULT  Goal: Incision(s), wounds(s) or drain site(s) healing without S/S of infection  Description: INTERVENTIONS:  - Assess and document risk factors for pressure ulcer development  - Assess and document skin integrity  - Assess and document dressing/incision, wound bed, drain sites and surrounding tissue  - Implement wound care per orders  - Initiate isolation precautions as appropriate  - Initiate Pressure Ulcer prevention bundle as indicated  Outcome: Progressing     Problem: METABOLIC/FLUID AND ELECTROLYTES - ADULT  Goal: Glucose maintained within prescribed range  Description: INTERVENTIONS:  - Monitor Blood Glucose as ordered  - Assess for signs and symptoms of hyperglycemia and hypoglycemia  - Administer ordered medications to maintain glucose within target range  - Assess barriers to adequate nutritional intake and initiate nutrition consult as needed  - Instruct patient on self  management of diabetes  Outcome: Progressing  Goal: Electrolytes maintained within normal limits  Description: INTERVENTIONS:  - Monitor labs and rhythm and assess patient for signs and symptoms of electrolyte imbalances  - Administer electrolyte replacement as ordered  - Monitor response to electrolyte replacements, including rhythm and repeat lab results as appropriate  - Fluid restriction as ordered  - Instruct patient on fluid and nutrition restrictions as appropriate  Outcome: Progressing  Goal: Hemodynamic stability and optimal renal function maintained  Description: INTERVENTIONS:  - Monitor labs and assess for signs and symptoms of volume excess or deficit  - Monitor intake, output and patient weight  - Monitor urine specific gravity, serum osmolarity and serum sodium as indicated or ordered  - Monitor response to interventions for patient's volume status, including labs, urine output, blood pressure (other measures as available)  - Encourage oral intake as appropriate  - Instruct patient on fluid and nutrition restrictions as appropriate  Outcome: Progressing     Problem: RISK FOR INFECTION - ADULT  Goal: Absence of fever/infection during anticipated neutropenic period  Description: INTERVENTIONS  - Monitor WBC  - Administer growth factors as ordered  - Implement neutropenic guidelines  Outcome: Progressing

## 2024-11-18 NOTE — SPIRITUAL CARE NOTE
Spiritual Care Visit Note    Patient Name: Davin Hassan Date of Spiritual Care Visit: 24   : 1955 Primary Dx: <principal problem not specified>       Referred By: Referral From: Family    Spiritual Care Taxonomy:    Intended Effects: Aligning care plan with patient's values    Methods: Assist with spiritual/Presybeterian practices    Interventions: Connect someone with their corie community/clergy    Visit Type/Summary:     - Spiritual Care: Responded to a request via the on call phone Provided support for Patient's spiritual/Presybeterian requests. Coordinated  visit for Sacrament of the Sick.  remains available for follow up.    Spiritual Care support can be requested via an Epic consult. For urgent/immediate needs, please contact the On Call  at: [place]: ext [phone]    Chaplain Resident, Heather Metzger PhD

## 2024-11-18 NOTE — PROGRESS NOTES
Progress Note     Davin Hassan Patient Status:  Observation    1955 MRN [id number]   Location [facility] 3W/SW Attending Sofia Boyce MD   Hosp Day # 4 PCP Susana Magallon MD     Chief Complaint: patient presented with No chief complaint on file.      Subjective:   S: Patient denies any cp, no sob, no  n/v, foot feels same, L leg swollen, says it is chronic.     Review of Systems:   10 point ROS completed and was negative, except for pertinent positive and negatives stated in subjective.    Objective:   Vital signs:  Temp:  [98 °F (36.7 °C)-98.4 °F (36.9 °C)] 98 °F (36.7 °C)  Pulse:  [59-68] 62  Resp:  [16-18] 18  BP: (117-130)/(55-69) 130/62  SpO2:  [96 %] 96 %    Wt Readings from Last 6 Encounters:   24 208 lb 1.6 oz (94.4 kg)   23 224 lb (101.6 kg)   22 230 lb 6.4 oz (104.5 kg)   22 230 lb (104.3 kg)   22 224 lb (101.6 kg)   19 221 lb (100.2 kg)         Physical Exam:    General: No acute distress. Alert ,         Respiratory: Clear to auscultation bilaterally. No wheezes. No rhonchi.  Cardiovascular: S1, S2. Regular rate and rhythm. No murmurs, rubs or gallops.   Abdomen: Soft, nontender, nondistended.  Positive bowel sounds. No rebound or guarding.  Neurologic: No focal neurological deficits.   Musculoskeletal: Moves all extremities.  Extremities: No edema.    Results:   Diagnostic Data:      Labs:    Labs Last 24 Hours:   BMP     CBC    Other     Na 142 Cl 103 BUN 25 Glu 161   Hb 11.9   PTT - Procal -   K 4.1 CO2 37.0 Cr 1.06   WBC 7.8 >< .0  INR - CRP -   Renal Lytes Endo    Hct 37.1   Trop - D dim -   eGFR - Ca 9.8 POC Gluc  160    LFT   pBNP - Lactic -   eGFR AA - PO4 - A1c -   AST - APk - Prot -  LDL -     Mg - TSH -   ALT - T paige - Alb -        COVID-19 Lab Results    COVID-19  Lab Results   Component Value Date    COVID19 Detected (A) 2022       Pro-Calcitonin  No results for input(s): \"PCT\" in the last 168  hours.    Cardiac  No results for input(s): \"TROP\", \"PBNP\" in the last 168 hours.    Creatinine Kinase  No results for input(s): \"CK\" in the last 168 hours.    Inflammatory Markers  No results for input(s): \"CRP\", \"MESERET\", \"LDH\", \"DDIMER\" in the last 168 hours.    Imaging: Imaging data reviewed in Epic.    Medications:    vancomycin  1,500 mg Intravenous Q18H    insulin degludec  25 Units Subcutaneous Nightly    torsemide  20 mg Oral Daily    rosuvastatin  40 mg Oral Nightly    insulin aspart  1-7 Units Subcutaneous TID CC and HS    cefTRIAXone  2 g Intravenous Q24H    sennosides  8.6 mg Oral BID    docusate sodium  100 mg Oral BID    latanoprost  1 drop Both Eyes Nightly    heparin  5,000 Units Subcutaneous 2 times per day    aspirin  81 mg Oral Daily     MRI FOOT   There is a fluid collection along the dorsal aspect of the 5th metatarsophalangeal joint measuring approximately 3 x 3 x 1 cm, with surrounding inflammation.  The collection may represent abscess , and the surrounding soft tissue inflammation suggests   cellulitis.  There are signal changes within the 5th metatarsal head as well as the 5th proximal and middle phalanxes, compatible with early osteomyelitis       Assessment & Plan:   ASSESSMENT / PLAN:     Problem List Items Addressed This Visit          Health Encounters    Pre-op testing - Primary    Relevant Orders    Basic Metabolic Panel (8) (Completed)    CBC, Platelet; No Differential (Completed)    XR CHEST PA + LAT CHEST (Completed)     Other Visit Diagnoses       Abnormal nuclear stress test        Relevant Medications    heparin (Porcine) 1000 UNIT/ML injection (Completed)    fentaNYL (Sublimaze) 50 mcg/mL injection (Completed)    verapamil (Isoptin) 2.5 mg/mL injection (Completed)    lidocaine PF (Xylocaine-MPF) 2 % injection (Completed)    Nitroglycerin in D5W 200-5 MCG/ML-% injection (Completed)    aspirin 81 MG Oral Tab EC    atorvastatin 80 MG Oral Tab    metoprolol tartrate 25 MG Oral Tab     heparin (Porcine) 5000 UNIT/ML injection 5,000 Units    aspirin DR tab 81 mg    torsemide (Demadex) tab 20 mg    rosuvastatin (Crestor) tab 40 mg    Other Relevant Orders    CATH LEFT AND RIGHT CATH W/INTERVENTION (Completed)    Abnormal echocardiogram        Relevant Medications    heparin (Porcine) 1000 UNIT/ML injection (Completed)    verapamil (Isoptin) 2.5 mg/mL injection (Completed)    lidocaine PF (Xylocaine-MPF) 2 % injection (Completed)    Nitroglycerin in D5W 200-5 MCG/ML-% injection (Completed)    aspirin 81 MG Oral Tab EC    atorvastatin 80 MG Oral Tab    metoprolol tartrate 25 MG Oral Tab    heparin (Porcine) 5000 UNIT/ML injection 5,000 Units    aspirin DR tab 81 mg    magnesium hydroxide (Milk of Magnesia) 400 MG/5ML oral suspension 30 mL    torsemide (Demadex) tab 20 mg    rosuvastatin (Crestor) tab 40 mg    Other Relevant Orders    CATH LEFT AND RIGHT CATH W/INTERVENTION (Completed)              69 y/o male with Pmhx of Insulin-dependent diabetes mellitus type 2, hyperlipidemia, peripheral diabetic neuropathy, glaucoma, and peripheral arterial disease.he was evaluated by Cardiology and had a stress test which was abnormal.  Today, he had a cardiac angiogram done by his cardiologist, Dr. Barone, which showed reportedly multivessel coronary artery disease.  Patient will be admitted to the hospital for further cardiovascular surgery evaluation.       Multivessel CAD  CHF-Stable   -Cardiology and Cardiothoracic surgery consulted   -on PO Torsemide,   -plan CABG once infection is cleared         LLE cellulitis with fifth toe ulceration, OM , abscess  -ID following ->IV vanc and ceftriaxone  -MRI -osteo in 5th toe ->plan for bone bx  -Podiatry consulted -Pt follows with Dr Bowen Jeffries -consulted however they are on staff, consulted Dr Dow (podiatry on call)  -toine reviewed      DM  SSI     Quality:  DVT Prophylaxis: heparin  CODE status: FULL   DISPO: pending clinical improvement.   Estimated  date of discharge: To be determined  Discharge is dependent on: Improved clinical status  At this point Patient is expected to be discharge to: Home versus rehab      Plan of care discussed with Patient and RN.     Coordinated care with providers and counseling re: treatment plan and workup     Sofia Boyce MD    Supplementary Documentation:         **Certification      PHYSICIAN Certification of Need for Inpatient Hospitalization - Initial Certification    Patient will require inpatient services that will reasonably be expected to span two midnight's based on the clinical documentation in H+P.   Based on patients current state of illness, I anticipate that, after discharge, patient will require TBD.         MDM: High, acute illness/severe exacerbation of chronic illness posing threat to life.  IV medications requiring close inpatient monitoring

## 2024-11-18 NOTE — CONSULTS
Consult Note    Patient Name: Davin Hassan    YOB: 1955    Date of Admission: 11/14/2024    History of present Illness    Davin Hassan is a 68 year old male who was admitted for Abnormal nuclear stress test [R94.39]  Abnormal echocardiogram [R93.1]. Patient was Dr. Ornelas for evaluation of lower extremity.  Patient is a 68-year-old man with a past medical history of insulin-dependent diabetes type 2, hyperlipidemia, peripheral diabetic neuropathy, glaucoma, peripheral arterial disease who was recently on vacation in [place] when he noted some orthopnea, dyspnea on exertion and developed cellulitis of his left lower extremity left leg.  Patient was admitted in the hospital in [facility] and started on antibiotics and dry diuretics.  Upon return to [place] he was evaluated by cardiology and had a stress test which was abnormal.  Patient subsequently underwent a cardiac angiogram by his cardiologist which showed reportedly multilevel coronary artery disease.  Patient was subsequently admitted on 11/14/2024 for further evaluation.  Upon presentation patient still had persistent cellulitis of the left lower extremity and an ulceration on the dorsal aspect the left fifth digit.  An MRI was ordered which noted concern for fluid collection on the dorsal aspect of the fifth MPJ as well as possible osteomyelitis of the fifth metatarsal head and base of the fifth proximal phalanx.  On 11/12/2024 patient had a slight leukocytosis of 11.2 but by the time of presentation to the hospital on 11/14/2024 this had resolved.  Patient was afebrile.  Infectious disease was consulted who recommended vancomycin and Zosyn.  In regards to his multivessel CAD cardiology and cardiothoracic surgery consulted and plan for CABG once infection is cleared.  Arterial studies performed on the lower extremity on 9/12/2024 showed left lower extremity diminished pressures and dampened waveforms at the  popliteal and posterior tibial levels consistent with some atherosclerotic involvement of the segments.  Cardiology recently evaluated patient who reviewed ABIs and recommended holding off on any angio and may consider TBI versus peripheral angiogram if ischemia is concerned.    Patient evaluated at bedside this afternoon resting comfortably.    Prescriptions Prior to Admission[1]  Allergies[2]  Past Medical History:    Alternating esotropia    Alternating esotropia    BDR (background diabetic retinopathy) ([facility])    under the care of Dr. Brito- see progress note    Colon adenomas    x4    Diabetic macular edema of left eye ([facility])    S/P Eylea injection OS x 5 with Dr. Brito    Glaucoma suspect of both eyes    Primary open angle glaucoma (POAG) of left eye, mild stage    2/4/19 start Latanoprost qhs OS due to thinning of the optic nerve on OCT    Type II or unspecified type diabetes mellitus without mention of complication, not stated as uncontrolled     Past Surgical History:   Procedure Laterality Date    Colonoscopy  03/2008    Colonoscopy  04/05/2023    Strabismus surgery Bilateral 1959     Social History     Socioeconomic History    Marital status:      Spouse name: Not on file    Number of children: Not on file    Years of education: Not on file    Highest education level: Not on file   Occupational History    Not on file   Tobacco Use    Smoking status: Never    Smokeless tobacco: Never   Vaping Use    Vaping status: Never Used   Substance and Sexual Activity    Alcohol use: Yes     Alcohol/week: 1.0 standard drink of alcohol     Types: 1 Glasses of wine per week     Comment: rarely, beer & liquor, 1 drink     Drug use: No    Sexual activity: Not on file   Other Topics Concern     Service Not Asked    Blood Transfusions Not Asked    Caffeine Concern Yes     Comment: coffee, soda, 3 cups daily    Occupational Exposure Not Asked    Hobby Hazards Not Asked    Sleep Concern Not Asked    Stress  Concern Not Asked    Weight Concern Not Asked    Special Diet Not Asked    Back Care Not Asked    Exercise Not Asked    Bike Helmet Not Asked    Seat Belt Not Asked    Self-Exams Not Asked   Social History Narrative    Not on file     Social Drivers of Health     Financial Resource Strain: Not on file   Food Insecurity: No Food Insecurity (11/14/2024)    Food Insecurity     Food Insecurity: Never true   Transportation Needs: No Transportation Needs (11/14/2024)    Transportation Needs     Lack of Transportation: No     Car Seat: Not on file   Physical Activity: Not on file   Stress: Not on file   Social Connections: Not on file   Housing Stability: Low Risk  (11/14/2024)    Housing Stability     Housing Instability: No     Housing Instability Emergency: Not on file     Crib or Bassinette: Not on file     Family History   Problem Relation Age of Onset    Diabetes Father     Glaucoma Father     Macular degeneration Father     Breast Cancer Mother     Heart Disorder Brother        Review of Systems  Constitutional: negative for chills, fevers and sweats  Gastrointestinal: negative for abdominal pain, diarrhea, nausea and vomiting  Genitourinary:negative for dysuria and hematuria  Musculoskeletal:negative for arthralgias and muscle weakness  Neurological: negative for paresthesia and weakness  All others reviewed and negative.      Physical Exam  Temp:  [98.2 °F (36.8 °C)-98.4 °F (36.9 °C)] 98.4 °F (36.9 °C)  Pulse:  [59-68] 59  Resp:  [16-18] 16  BP: (117-122)/(55-69) 117/69  SpO2:  [96 %] 96 %    Constitution: Well-developed and well-nourished. Gait appears normal. No apparent distress. Alert and oriented to person, place, and time.  Integument: There are no varicosities.  There are no color changes.  No macerations, No Hyperkeratotic lesions.  Vascular examination: Dorsalis pedis and posterior tibial pulses are weakly palpable on the left strong on the right.  Neurological Sensorium: Grossly intact to sharp/dull.  Vibratory: Intact.  Musculoskeletal:   5/5 pedal muscle strength b/l     Left lower extremity:    Ulceration:  Location: Dorsal aspect of IPJ of left fifth digit  Measurements: 1.5 x 2  Wound periphery: No undermining, fluctuance periwound erythema  Wound base: Stable eschar  Drainage: None    Imaging  CATH LEFT AND RIGHT CATH W/INTERVENTION    Result Date: 11/14/2024  This exam has been completed. Please refer to Notes for the results to this procedure.      Labs  Lab Results   Component Value Date    WBC 7.8 11/18/2024    HGB 11.9 (L) 11/18/2024    HCT 37.1 (L) 11/18/2024    MCV 99.5 11/18/2024    .0 11/18/2024     No results found for: \"PTT\"  No results found for: \"PROTIME\"  Lab Results   Component Value Date     11/18/2024    K 4.1 11/18/2024    CO2 37.0 (H) 11/18/2024     11/18/2024    BUN 25 (H) 11/18/2024       Assessment    68 year old male with a past medical history of insulin-dependent diabetes type 2, hyperlipidemia, peripheral diabetic neuropathy, glaucoma, peripheral arterial disease presenting with concern for left fifth digit ulceration with possible underlying osteomyelitis.     Plan     Left fifth digit diabetic foot ulceration/concern for underlying osteomyelitis: Had a lengthy discussion with patient in regards to his clinical presentation and MRI findings.  Discussed an MRI is not conclusive for osteomyelitis but rather insensitive for concern with bone marrow edema.  Discussed that my primary concern at this time is his ability to heal any sort of treatment whether it be biopsy versus amputation versus IV antibiotics.  Discussed that for most good peripheral arterial flow is needed.  Will touch base with the cardiovascular team in regards to ordering TBI's versus peripheral angiogram.  Discussed bone biopsy versus amputation in great detail with patient.Patient consented for bone biopsy x 2 if the left fifth digit and 5th bone biopsy tomorrow 11/19/24 at 1800. Spoke with  nurse.   CAD/PAD: Currently being followed by cardiology.  Per review of last note, considering holding off on peripheral angiogram pending concern for ischemic etiology.  May consider TBI versus peripheral angiogram.  Antibiotics: Currently being managed by infectious disease on Vanco Zosyn.  No wound culture.  Patient currently afebrile with no leukocytosis.  Discharge planning: Pending biopsy results.        Katelynn Vasquez DPM, [email or web address], FACFAS  Diplomat, American Board of Foot and Ankle Surgery  Certified in Foot and Rearfoot/Ankle Reconstruction  Fellow of the American College of Foot and Ankle Surgeons  Fellowship Trained Foot and Ankle Surgeon   [facility]          [1]   Medications Prior to Admission   Medication Sig Dispense Refill Last Dose/Taking    doxycycline 100 MG Oral Cap Take 1 capsule (100 mg total) by mouth 2 (two) times daily.   11/13/2024    AMOXICILLIN OR Take 1 g by mouth in the morning and 1 g before bedtime. Day 4 of Day 7   2 pills morning and evening.   11/13/2024    acetaminophen 500 MG Oral Tab Take 1 tablet (500 mg total) by mouth every 6 (six) hours as needed for Pain.   Past Week    insulin degludec 100 units/mL Subcutaneous Solution Pen-injector Inject 28 Units into the skin nightly.   11/13/2024    latanoprost 0.005 % Ophthalmic Solution INSTILL 1 DROP INTO BOTH   EYES EVERY NIGHT 7.5 mL 3 11/13/2024    NOVOLOG FLEXPEN 100 UNIT/ML Subcutaneous Solution Pen-injector Inject 8 Units into the skin 3 (three) times daily before meals. Following a sliding scale at home.   Past Month    Accu-Chek Soft Touch Lancets Does not apply Misc        Glucose Blood In Vitro Strip        Insulin Pen Needle 31G X 6 MM Does not apply Misc       [2] No Known Allergies

## 2024-11-18 NOTE — PROGRESS NOTES
[facility] Pharmacy Dosing Service      Follow Up Pharmacokinetic Consult for Vancomycin Dosing     Davin Hassan is a 68 year old male who is receiving vancomycin therapy for cellulitis. Patient is on day 5 of vancomycin and is currently receiving 1500 mg IV every 24 hours. The current treatment and monitoring approach is steady state AUC strategy.        Weight and Temperature:    Wt Readings from Last 1 Encounters:   24 94.4 kg (208 lb 1.6 oz)         Temp Readings from Last 1 Encounters:   24 98.4 °F (36.9 °C) (Oral)      Labs:   Recent Labs   Lab 11/15/24  0628 24  0649 24  0501   CREATSERUM 1.02 0.95 1.06      Estimated Creatinine Clearance: 77.5 mL/min (based on SCr of 1.06 mg/dL).     Recent Labs   Lab 24  1301 11/15/24  0628 24  0501   WBC 11.2* 9.5 7.8        Vancomycin Levels:  Lab Results   Component Value Date/Time    VANCR 15.4 2024 05:01 AM    VANCP 20.0 (L) 2024 04:20 PM     =20 was drawn while vancomycin was still infusing (approximately 40 minutes into a 90 mg infusion)     Vanc random=15.4 ~13.5 post 1500mg dose  Corresponding 24 h-AUC: N/A     The Pharmacokinetic Target is:    Trough/random 10-15 mg/L    Renal Dosing Considerations:    None     Assessment/Plan:   Maintenance Regimen:  Change Vancomycin to 1500mg ivpb q18hr - next dose due at 1000 today    Monitorin) Plan for vancomycin trough to be obtained  prior to 4rth dose    2) Pharmacy will order SCr as clinically indicated to assess renal function.    3) Pharmacy will monitor for toxicity and efficacy, adjust vancomycin dose and/or frequency, and order vancomycin levels as appropriate per the Pharmacy and Therapeutics Committee approved protocol until discontinuation of the medication.       We appreciate the opportunity to assist in the care of this patient.     Joe Valdes, PharmD  2024  6:03 AM  [facility]  Pharmacy Extension: [phone]

## 2024-11-18 NOTE — CM/SW NOTE
Received Vmail from Betsy son/ [facility] inquiring about pt's plans/needs.    SW returned Betsy's call - provided update that ID plan is pending podiatry seeing pt and their plan.    PLAN: TBD - pending clinical course/plan from MD's        SW/CM to remain available for support and/or discharge planning.            JAMILA Tom, LSW [id number]

## 2024-11-18 NOTE — PROGRESS NOTES
INFECTIOUS DISEASE PROGRESS NOTE  [facility]  [facility] ID PROGRESS NOTE    Davin Hassan Patient Status:  Inpatient    1955 MRN [id number]   Location [facility] 3W/SW Attending Sofia Boyce MD   Hosp Day # 4 PCP Susana Magallon MD     Subjective:  ROS reviewed. Having more drainage from fifth toe.    ASSESSMENT:    Antibiotics: Vancomycin, ceftriaxone     # Acute LLE cellulitis with fifth toe ulceration, MRI c/w abscess with OM               -On PO doxycycline and augmentin PTA  # Multivessel CAD               -CV surgery following  # PAD  # Diabetes mellitus     PLAN:  -  Continue on vancomycin and ceftriaxone.  -  Podiatry saw with plans for bone biopsy.  -  FU CTA.  -  Follow fever curve, wbc.  -  Reviewed labs, micro, imaging reports, available old records.  -  Case d/w patient, RN.     History of Present Illness:  Davin Hassan is a 68 year old male with a history of diabetes mellitus, PAD, who was recently on a trip to [place] and was hospitalized in [place] for LLE cellulitis along with CHF given dyspnea on exertion, was given augmentin and diuretics and flew home. Developed a callus on his fifth toe which he states was draining. Saw podiatry this past Wednesday and started on doxycycline. Had planned C yesterday with findings of severe triple vessel disease and seen by CV surgery with concerns regarding L foot ulcer. Vancomycin started and plans for L foot MRI. ID consulted.    Physical Exam:  /65 (BP Location: Right arm)   Pulse 63   Temp 98.3 °F (36.8 °C) (Oral)   Resp 18   Ht 6' 1\" (1.854 m)   Wt 208 lb 1.6 oz (94.4 kg)   SpO2 96%   BMI 27.46 kg/m²     Gen:   Awake, in chair  HEENT:  EOMI, neck supple  CV/lungs:  RRR, CTAB  Abdom:  Soft, NT/ND, +BS  Skin/extrem:  Improved LLE erythema, L fifth toe with purulent drainage   Lines:  PIV+    Laboratory Data: Reviewed    Microbiology: Reviewed    Radiology:  Reviewed      [facility]  [phone]  11/18/2024

## 2024-11-19 ENCOUNTER — APPOINTMENT (OUTPATIENT)
Dept: GENERAL RADIOLOGY | Facility: HOSPITAL | Age: 69
DRG: 628 | End: 2024-11-19
Attending: PODIATRIST
Payer: MEDICARE

## 2024-11-19 ENCOUNTER — ANESTHESIA EVENT (OUTPATIENT)
Dept: SURGERY | Facility: HOSPITAL | Age: 69
End: 2024-11-19
Payer: MEDICARE

## 2024-11-19 ENCOUNTER — APPOINTMENT (OUTPATIENT)
Dept: GENERAL RADIOLOGY | Facility: HOSPITAL | Age: 69
End: 2024-11-19
Attending: PODIATRIST
Payer: MEDICARE

## 2024-11-19 ENCOUNTER — ANESTHESIA (OUTPATIENT)
Dept: SURGERY | Facility: HOSPITAL | Age: 69
End: 2024-11-19
Payer: MEDICARE

## 2024-11-19 ENCOUNTER — APPOINTMENT (OUTPATIENT)
Dept: ULTRASOUND IMAGING | Facility: HOSPITAL | Age: 69
DRG: 628 | End: 2024-11-19
Attending: INTERNAL MEDICINE
Payer: MEDICARE

## 2024-11-19 ENCOUNTER — APPOINTMENT (OUTPATIENT)
Dept: ULTRASOUND IMAGING | Facility: HOSPITAL | Age: 69
End: 2024-11-19
Attending: INTERNAL MEDICINE
Payer: MEDICARE

## 2024-11-19 LAB
ANION GAP SERPL CALC-SCNC: 5 MMOL/L (ref 0–18)
BASOPHILS # BLD AUTO: 0.05 X10(3) UL (ref 0–0.2)
BASOPHILS NFR BLD AUTO: 0.7 %
BUN BLD-MCNC: 24 MG/DL (ref 9–23)
BUN/CREAT SERPL: 25.8 (ref 10–20)
CALCIUM BLD-MCNC: 9.8 MG/DL (ref 8.7–10.4)
CHLORIDE SERPL-SCNC: 103 MMOL/L (ref 98–112)
CHOLEST SERPL-MCNC: 176 MG/DL (ref ?–200)
CO2 SERPL-SCNC: 33 MMOL/L (ref 21–32)
CREAT BLD-MCNC: 0.93 MG/DL
DEPRECATED RDW RBC AUTO: 43.8 FL (ref 35.1–46.3)
EGFRCR SERPLBLD CKD-EPI 2021: 91 ML/MIN/1.73M2 (ref 60–?)
EOSINOPHIL # BLD AUTO: 0.2 X10(3) UL (ref 0–0.7)
EOSINOPHIL NFR BLD AUTO: 2.6 %
ERYTHROCYTE [DISTWIDTH] IN BLOOD BY AUTOMATED COUNT: 12 % (ref 11–15)
GLUCOSE BLD-MCNC: 119 MG/DL (ref 70–99)
GLUCOSE BLDC GLUCOMTR-MCNC: 113 MG/DL (ref 70–99)
GLUCOSE BLDC GLUCOMTR-MCNC: 144 MG/DL (ref 70–99)
GLUCOSE BLDC GLUCOMTR-MCNC: 200 MG/DL (ref 70–99)
GLUCOSE BLDC GLUCOMTR-MCNC: 75 MG/DL (ref 70–99)
GLUCOSE BLDC GLUCOMTR-MCNC: 80 MG/DL (ref 70–99)
HCT VFR BLD AUTO: 37.9 %
HDLC SERPL-MCNC: 35 MG/DL (ref 40–59)
HGB BLD-MCNC: 12.9 G/DL
IMM GRANULOCYTES # BLD AUTO: 0.03 X10(3) UL (ref 0–1)
IMM GRANULOCYTES NFR BLD: 0.4 %
LDLC SERPL CALC-MCNC: 122 MG/DL (ref ?–100)
LYMPHOCYTES # BLD AUTO: 1.48 X10(3) UL (ref 1–4)
LYMPHOCYTES NFR BLD AUTO: 19.4 %
MCH RBC QN AUTO: 33.5 PG (ref 26–34)
MCHC RBC AUTO-ENTMCNC: 34 G/DL (ref 31–37)
MCV RBC AUTO: 98.4 FL
MONOCYTES # BLD AUTO: 0.57 X10(3) UL (ref 0.1–1)
MONOCYTES NFR BLD AUTO: 7.5 %
NEUTROPHILS # BLD AUTO: 5.28 X10 (3) UL (ref 1.5–7.7)
NEUTROPHILS # BLD AUTO: 5.28 X10(3) UL (ref 1.5–7.7)
NEUTROPHILS NFR BLD AUTO: 69.4 %
NONHDLC SERPL-MCNC: 141 MG/DL (ref ?–130)
OSMOLALITY SERPL CALC.SUM OF ELEC: 297 MOSM/KG (ref 275–295)
PLATELET # BLD AUTO: 249 10(3)UL (ref 150–450)
POTASSIUM SERPL-SCNC: 3.9 MMOL/L (ref 3.5–5.1)
RBC # BLD AUTO: 3.85 X10(6)UL
SODIUM SERPL-SCNC: 141 MMOL/L (ref 136–145)
TRIGL SERPL-MCNC: 104 MG/DL (ref 30–149)
VLDLC SERPL CALC-MCNC: 18 MG/DL (ref 0–30)
WBC # BLD AUTO: 7.6 X10(3) UL (ref 4–11)

## 2024-11-19 PROCEDURE — 0Y9N0ZZ DRAINAGE OF LEFT FOOT, OPEN APPROACH: ICD-10-PCS | Performed by: PODIATRIST

## 2024-11-19 PROCEDURE — 99233 SBSQ HOSP IP/OBS HIGH 50: CPT | Performed by: INTERNAL MEDICINE

## 2024-11-19 PROCEDURE — 0QBP0ZX EXCISION OF LEFT METATARSAL, OPEN APPROACH, DIAGNOSTIC: ICD-10-PCS | Performed by: PODIATRIST

## 2024-11-19 PROCEDURE — 93971 EXTREMITY STUDY: CPT | Performed by: INTERNAL MEDICINE

## 2024-11-19 PROCEDURE — 0QBR0ZX EXCISION OF LEFT TOE PHALANX, OPEN APPROACH, DIAGNOSTIC: ICD-10-PCS | Performed by: PODIATRIST

## 2024-11-19 PROCEDURE — 73630 X-RAY EXAM OF FOOT: CPT | Performed by: PODIATRIST

## 2024-11-19 PROCEDURE — 28002 TREATMENT OF FOOT INFECTION: CPT | Performed by: PODIATRIST

## 2024-11-19 PROCEDURE — 20220 BONE BIOPSY TROCAR/NDL SUPFC: CPT | Performed by: PODIATRIST

## 2024-11-19 RX ORDER — SODIUM CHLORIDE, SODIUM LACTATE, POTASSIUM CHLORIDE, CALCIUM CHLORIDE 600; 310; 30; 20 MG/100ML; MG/100ML; MG/100ML; MG/100ML
INJECTION, SOLUTION INTRAVENOUS CONTINUOUS PRN
Status: DISCONTINUED | OUTPATIENT
Start: 2024-11-19 | End: 2024-11-19 | Stop reason: SURG

## 2024-11-19 RX ORDER — HYDROMORPHONE HYDROCHLORIDE 1 MG/ML
0.4 INJECTION, SOLUTION INTRAMUSCULAR; INTRAVENOUS; SUBCUTANEOUS EVERY 5 MIN PRN
Status: DISCONTINUED | OUTPATIENT
Start: 2024-11-19 | End: 2024-11-19 | Stop reason: HOSPADM

## 2024-11-19 RX ORDER — METOCLOPRAMIDE HYDROCHLORIDE 5 MG/ML
10 INJECTION INTRAMUSCULAR; INTRAVENOUS EVERY 8 HOURS PRN
Status: DISCONTINUED | OUTPATIENT
Start: 2024-11-19 | End: 2024-11-19 | Stop reason: HOSPADM

## 2024-11-19 RX ORDER — ONDANSETRON 2 MG/ML
4 INJECTION INTRAMUSCULAR; INTRAVENOUS EVERY 6 HOURS PRN
Status: DISCONTINUED | OUTPATIENT
Start: 2024-11-19 | End: 2024-11-19 | Stop reason: HOSPADM

## 2024-11-19 RX ORDER — MORPHINE SULFATE 10 MG/ML
6 INJECTION, SOLUTION INTRAMUSCULAR; INTRAVENOUS EVERY 10 MIN PRN
Status: DISCONTINUED | OUTPATIENT
Start: 2024-11-19 | End: 2024-11-19 | Stop reason: HOSPADM

## 2024-11-19 RX ORDER — MIDAZOLAM HYDROCHLORIDE 2 MG/ML
SYRUP ORAL AS NEEDED
Status: DISCONTINUED | OUTPATIENT
Start: 2024-11-19 | End: 2024-11-19 | Stop reason: SURG

## 2024-11-19 RX ORDER — SODIUM CHLORIDE, SODIUM LACTATE, POTASSIUM CHLORIDE, CALCIUM CHLORIDE 600; 310; 30; 20 MG/100ML; MG/100ML; MG/100ML; MG/100ML
INJECTION, SOLUTION INTRAVENOUS CONTINUOUS
Status: DISCONTINUED | OUTPATIENT
Start: 2024-11-19 | End: 2024-11-19 | Stop reason: HOSPADM

## 2024-11-19 RX ORDER — DEXTROSE MONOHYDRATE 50 MG/ML
INJECTION, SOLUTION INTRAVENOUS CONTINUOUS
Status: DISCONTINUED | OUTPATIENT
Start: 2024-11-19 | End: 2024-11-21

## 2024-11-19 RX ORDER — HYDROMORPHONE HYDROCHLORIDE 1 MG/ML
0.6 INJECTION, SOLUTION INTRAMUSCULAR; INTRAVENOUS; SUBCUTANEOUS EVERY 5 MIN PRN
Status: DISCONTINUED | OUTPATIENT
Start: 2024-11-19 | End: 2024-11-19 | Stop reason: HOSPADM

## 2024-11-19 RX ORDER — HYDROMORPHONE HYDROCHLORIDE 1 MG/ML
0.2 INJECTION, SOLUTION INTRAMUSCULAR; INTRAVENOUS; SUBCUTANEOUS EVERY 5 MIN PRN
Status: DISCONTINUED | OUTPATIENT
Start: 2024-11-19 | End: 2024-11-19 | Stop reason: HOSPADM

## 2024-11-19 RX ORDER — LIDOCAINE HYDROCHLORIDE 10 MG/ML
INJECTION, SOLUTION EPIDURAL; INFILTRATION; INTRACAUDAL; PERINEURAL AS NEEDED
Status: DISCONTINUED | OUTPATIENT
Start: 2024-11-19 | End: 2024-11-19 | Stop reason: HOSPADM

## 2024-11-19 RX ORDER — LOSARTAN POTASSIUM 25 MG/1
25 TABLET ORAL DAILY
Status: DISCONTINUED | OUTPATIENT
Start: 2024-11-19 | End: 2024-11-21

## 2024-11-19 RX ORDER — NALOXONE HYDROCHLORIDE 0.4 MG/ML
0.08 INJECTION, SOLUTION INTRAMUSCULAR; INTRAVENOUS; SUBCUTANEOUS AS NEEDED
Status: DISCONTINUED | OUTPATIENT
Start: 2024-11-19 | End: 2024-11-19 | Stop reason: HOSPADM

## 2024-11-19 RX ORDER — INSULIN ASPART 100 [IU]/ML
INJECTION, SOLUTION INTRAVENOUS; SUBCUTANEOUS ONCE
Status: DISCONTINUED | OUTPATIENT
Start: 2024-11-19 | End: 2024-11-19 | Stop reason: HOSPADM

## 2024-11-19 RX ORDER — BUPIVACAINE HYDROCHLORIDE 5 MG/ML
INJECTION, SOLUTION EPIDURAL; INTRACAUDAL AS NEEDED
Status: DISCONTINUED | OUTPATIENT
Start: 2024-11-19 | End: 2024-11-19 | Stop reason: HOSPADM

## 2024-11-19 RX ORDER — MORPHINE SULFATE 4 MG/ML
4 INJECTION, SOLUTION INTRAMUSCULAR; INTRAVENOUS EVERY 10 MIN PRN
Status: DISCONTINUED | OUTPATIENT
Start: 2024-11-19 | End: 2024-11-19 | Stop reason: HOSPADM

## 2024-11-19 RX ORDER — MORPHINE SULFATE 4 MG/ML
2 INJECTION, SOLUTION INTRAMUSCULAR; INTRAVENOUS EVERY 10 MIN PRN
Status: DISCONTINUED | OUTPATIENT
Start: 2024-11-19 | End: 2024-11-19 | Stop reason: HOSPADM

## 2024-11-19 RX ORDER — LABETALOL HYDROCHLORIDE 5 MG/ML
5 INJECTION, SOLUTION INTRAVENOUS EVERY 5 MIN PRN
Status: DISCONTINUED | OUTPATIENT
Start: 2024-11-19 | End: 2024-11-19 | Stop reason: HOSPADM

## 2024-11-19 RX ADMIN — VANCOMYCIN HYDROCHLORIDE 1500 MG: at 05:06:00

## 2024-11-19 RX ADMIN — INSULIN DEGLUDEC 25 UNITS: 100 INJECTION, SOLUTION SUBCUTANEOUS at 21:45:00

## 2024-11-19 RX ADMIN — VANCOMYCIN HYDROCHLORIDE 1500 MG: at 21:56:00

## 2024-11-19 RX ADMIN — DOCUSATE SODIUM 100 MG: 100 CAPSULE, LIQUID FILLED ORAL at 21:45:00

## 2024-11-19 RX ADMIN — SODIUM CHLORIDE, SODIUM LACTATE, POTASSIUM CHLORIDE, CALCIUM CHLORIDE: 600; 310; 30; 20 INJECTION, SOLUTION INTRAVENOUS at 18:46:00

## 2024-11-19 RX ADMIN — DEXTROSE MONOHYDRATE: 50 INJECTION, SOLUTION INTRAVENOUS at 17:17:00

## 2024-11-19 RX ADMIN — DEXTROSE MONOHYDRATE: 50 INJECTION, SOLUTION INTRAVENOUS at 19:43:00

## 2024-11-19 RX ADMIN — SODIUM CHLORIDE, SODIUM LACTATE, POTASSIUM CHLORIDE, CALCIUM CHLORIDE: 600; 310; 30; 20 INJECTION, SOLUTION INTRAVENOUS at 19:22:00

## 2024-11-19 RX ADMIN — TORSEMIDE 20 MG: 20 TABLET ORAL at 09:09:00

## 2024-11-19 RX ADMIN — ROSUVASTATIN CALCIUM 40 MG: 20 TABLET, COATED ORAL at 21:45:00

## 2024-11-19 RX ADMIN — ASPIRIN 81 MG: 81 TABLET ORAL at 09:09:00

## 2024-11-19 RX ADMIN — LATANOPROST 1 DROP: 50 SOLUTION/ DROPS OPHTHALMIC at 21:45:00

## 2024-11-19 RX ADMIN — MIDAZOLAM HYDROCHLORIDE 2 MG: 2 SYRUP ORAL at 18:44:00

## 2024-11-19 RX ADMIN — LOSARTAN POTASSIUM 25 MG: 25 TABLET ORAL at 14:21:00

## 2024-11-19 RX ADMIN — SENNOSIDES 8.6 MG: 8.6 MG TABLET ORAL at 21:45:00

## 2024-11-19 NOTE — PLAN OF CARE
Problem: Diabetes/Glucose Control  Goal: Glucose maintained within prescribed range  Description: INTERVENTIONS:  - Monitor Blood Glucose as ordered  - Assess for signs and symptoms of hyperglycemia and hypoglycemia  - Administer ordered medications to maintain glucose within target range  - Assess barriers to adequate nutritional intake and initiate nutrition consult as needed  - Instruct patient on self management of diabetes  Outcome: Progressing     Problem: Patient Centered Care  Goal: Patient preferences are identified and integrated in the patient's plan of care  Description: Interventions:  - What would you like us to know as we care for you? I'm from home with my wife  - Provide timely, complete, and accurate information to patient/family  - Incorporate patient and family knowledge, values, beliefs, and cultural backgrounds into the planning and delivery of care  - Encourage patient/family to participate in care and decision-making at the level they choose  - Honor patient and family perspectives and choices  Outcome: Progressing     Problem: Patient/Family Goals  Goal: Patient/Family Long Term Goal  Description: Patient's Long Term Goal: discharge home    Interventions:  - Monitor vitals, labs, imaging  - Tele  - Cards, podiatry, ID on consult  - See additional Care Plan goals for specific interventions  Outcome: Progressing  Goal: Patient/Family Short Term Goal  Description: Patient's Short Term Goal: manage pain    Interventions:   - Frequent pain assessments  - PRN pain meds  - non-pharmacological interventions  - See additional Care Plan goals for specific interventions  Outcome: Progressing     Problem: CARDIOVASCULAR - ADULT  Goal: Maintains optimal cardiac output and hemodynamic stability  Description: INTERVENTIONS:  - Monitor vital signs, rhythm, and trends  - Monitor for bleeding, hypotension and signs of decreased cardiac output  - Evaluate effectiveness of vasoactive medications to optimize  hemodynamic stability  - Monitor arterial and/or venous puncture sites for bleeding and/or hematoma  - Assess quality of pulses, skin color and temperature  - Assess for signs of decreased coronary artery perfusion - ex. Angina  - Evaluate fluid balance, assess for edema, trend weights  Outcome: Progressing  Goal: Absence of cardiac arrhythmias or at baseline  Description: INTERVENTIONS:  - Continuous cardiac monitoring, monitor vital signs, obtain 12 lead EKG if indicated  - Evaluate effectiveness of antiarrhythmic and heart rate control medications as ordered  - Initiate emergency measures for life threatening arrhythmias  - Monitor electrolytes and administer replacement therapy as ordered  Outcome: Progressing     Problem: SKIN/TISSUE INTEGRITY - ADULT  Goal: Incision(s), wounds(s) or drain site(s) healing without S/S of infection  Description: INTERVENTIONS:  - Assess and document risk factors for pressure ulcer development  - Assess and document skin integrity  - Assess and document dressing/incision, wound bed, drain sites and surrounding tissue  - Implement wound care per orders  - Initiate isolation precautions as appropriate  - Initiate Pressure Ulcer prevention bundle as indicated  Outcome: Progressing     Problem: METABOLIC/FLUID AND ELECTROLYTES - ADULT  Goal: Glucose maintained within prescribed range  Description: INTERVENTIONS:  - Monitor Blood Glucose as ordered  - Assess for signs and symptoms of hyperglycemia and hypoglycemia  - Administer ordered medications to maintain glucose within target range  - Assess barriers to adequate nutritional intake and initiate nutrition consult as needed  - Instruct patient on self management of diabetes  Outcome: Progressing  Goal: Electrolytes maintained within normal limits  Description: INTERVENTIONS:  - Monitor labs and rhythm and assess patient for signs and symptoms of electrolyte imbalances  - Administer electrolyte replacement as ordered  - Monitor  response to electrolyte replacements, including rhythm and repeat lab results as appropriate  - Fluid restriction as ordered  - Instruct patient on fluid and nutrition restrictions as appropriate  Outcome: Progressing  Goal: Hemodynamic stability and optimal renal function maintained  Description: INTERVENTIONS:  - Monitor labs and assess for signs and symptoms of volume excess or deficit  - Monitor intake, output and patient weight  - Monitor urine specific gravity, serum osmolarity and serum sodium as indicated or ordered  - Monitor response to interventions for patient's volume status, including labs, urine output, blood pressure (other measures as available)  - Encourage oral intake as appropriate  - Instruct patient on fluid and nutrition restrictions as appropriate  Outcome: Progressing     Problem: RISK FOR INFECTION - ADULT  Goal: Absence of fever/infection during anticipated neutropenic period  Description: INTERVENTIONS  - Monitor WBC  - Administer growth factors as ordered  - Implement neutropenic guidelines  Outcome: Progressing

## 2024-11-19 NOTE — PLAN OF CARE
Patient is alert and oriented. Patient to have bone biopsy today. Dressing was changed. Patient denies any pain. Safety measures are in place.     Problem: Diabetes/Glucose Control  Goal: Glucose maintained within prescribed range  Description: INTERVENTIONS:  - Monitor Blood Glucose as ordered  - Assess for signs and symptoms of hyperglycemia and hypoglycemia  - Administer ordered medications to maintain glucose within target range  - Assess barriers to adequate nutritional intake and initiate nutrition consult as needed  - Instruct patient on self management of diabetes  Outcome: Progressing     Problem: Patient Centered Care  Goal: Patient preferences are identified and integrated in the patient's plan of care  Description: Interventions:  - What would you like us to know as we care for you? I'm from home with my wife  - Provide timely, complete, and accurate information to patient/family  - Incorporate patient and family knowledge, values, beliefs, and cultural backgrounds into the planning and delivery of care  - Encourage patient/family to participate in care and decision-making at the level they choose  - Honor patient and family perspectives and choices  Outcome: Progressing     Problem: Patient/Family Goals  Goal: Patient/Family Long Term Goal  Description: Patient's Long Term Goal: discharge home    Interventions:  - Monitor vitals, labs, imaging  - Tele  - Cards, podiatry, ID on consult  - See additional Care Plan goals for specific interventions  Outcome: Progressing  Goal: Patient/Family Short Term Goal  Description: Patient's Short Term Goal: manage pain    Interventions:   - Frequent pain assessments  - PRN pain meds  - non-pharmacological interventions  - See additional Care Plan goals for specific interventions  Outcome: Progressing     Problem: CARDIOVASCULAR - ADULT  Goal: Maintains optimal cardiac output and hemodynamic stability  Description: INTERVENTIONS:  - Monitor vital signs, rhythm, and  trends  - Monitor for bleeding, hypotension and signs of decreased cardiac output  - Evaluate effectiveness of vasoactive medications to optimize hemodynamic stability  - Monitor arterial and/or venous puncture sites for bleeding and/or hematoma  - Assess quality of pulses, skin color and temperature  - Assess for signs of decreased coronary artery perfusion - ex. Angina  - Evaluate fluid balance, assess for edema, trend weights  Outcome: Progressing  Goal: Absence of cardiac arrhythmias or at baseline  Description: INTERVENTIONS:  - Continuous cardiac monitoring, monitor vital signs, obtain 12 lead EKG if indicated  - Evaluate effectiveness of antiarrhythmic and heart rate control medications as ordered  - Initiate emergency measures for life threatening arrhythmias  - Monitor electrolytes and administer replacement therapy as ordered  Outcome: Progressing     Problem: SKIN/TISSUE INTEGRITY - ADULT  Goal: Incision(s), wounds(s) or drain site(s) healing without S/S of infection  Description: INTERVENTIONS:  - Assess and document risk factors for pressure ulcer development  - Assess and document skin integrity  - Assess and document dressing/incision, wound bed, drain sites and surrounding tissue  - Implement wound care per orders  - Initiate isolation precautions as appropriate  - Initiate Pressure Ulcer prevention bundle as indicated  Outcome: Progressing     Problem: METABOLIC/FLUID AND ELECTROLYTES - ADULT  Goal: Glucose maintained within prescribed range  Description: INTERVENTIONS:  - Monitor Blood Glucose as ordered  - Assess for signs and symptoms of hyperglycemia and hypoglycemia  - Administer ordered medications to maintain glucose within target range  - Assess barriers to adequate nutritional intake and initiate nutrition consult as needed  - Instruct patient on self management of diabetes  Outcome: Progressing  Goal: Electrolytes maintained within normal limits  Description: INTERVENTIONS:  - Monitor labs  and rhythm and assess patient for signs and symptoms of electrolyte imbalances  - Administer electrolyte replacement as ordered  - Monitor response to electrolyte replacements, including rhythm and repeat lab results as appropriate  - Fluid restriction as ordered  - Instruct patient on fluid and nutrition restrictions as appropriate  Outcome: Progressing  Goal: Hemodynamic stability and optimal renal function maintained  Description: INTERVENTIONS:  - Monitor labs and assess for signs and symptoms of volume excess or deficit  - Monitor intake, output and patient weight  - Monitor urine specific gravity, serum osmolarity and serum sodium as indicated or ordered  - Monitor response to interventions for patient's volume status, including labs, urine output, blood pressure (other measures as available)  - Encourage oral intake as appropriate  - Instruct patient on fluid and nutrition restrictions as appropriate  Outcome: Progressing     Problem: RISK FOR INFECTION - ADULT  Goal: Absence of fever/infection during anticipated neutropenic period  Description: INTERVENTIONS  - Monitor WBC  - Administer growth factors as ordered  - Implement neutropenic guidelines  Outcome: Progressing

## 2024-11-19 NOTE — PROGRESS NOTES
Progress Note  Davin Hassan Patient Status:  Inpatient    1955 MRN [id number]   Location [facility] 3W/SW Attending Sofia Boyce MD   Hosp Day # 5 PCP Susana Magallon MD     SUBJECTIVE:    Denies chest pain or dyspnea. No numbness or tingling to RUE.     VITALS:  /57 (BP Location: Right arm)   Pulse 60   Temp 98 °F (36.7 °C) (Oral)   Resp 18   Ht 6' 1\" (1.854 m)   Wt 205 lb 9.6 oz (93.3 kg)   SpO2 94%   BMI 27.13 kg/m²     INTAKE/OUTPUT:    Intake/Output Summary (Last 24 hours) at 2024 1118  Last data filed at 2024 1100  Gross per 24 hour   Intake 1170 ml   Output 3775 ml   Net -2605 ml     Last 3 Weights   24 0458 205 lb 9.6 oz (93.3 kg)   24 0503 208 lb 1.6 oz (94.4 kg)   24 0317 209 lb 8 oz (95 kg)   24 0529 213 lb 9.6 oz (96.9 kg)   11/15/24 0502 221 lb 3.2 oz (100.3 kg)   24 1334 229 lb (103.9 kg)   24 1128 229 lb (103.9 kg)   24 1740 230 lb (104.3 kg)   10/24/24 1609 210 lb (95.3 kg)   23 0810 224 lb (101.6 kg)   22 1538 230 lb 6.4 oz (104.5 kg)     LABS:  Recent Labs   Lab 24  0649 24  0501 24  0716   GLU 65* 161* 119*   BUN 23 25* 24*   CREATSERUM 0.95 1.06 0.93   EGFRCR 88 77 91   CA 9.4 9.8 9.8    142 141   K 3.6 4.1 3.9    103 103   CO2 37.0* 37.0* 33.0*     Recent Labs   Lab 11/15/24  0628 24  0501 24  0716   RBC 3.36* 3.73* 3.85   HGB 10.8* 11.9* 12.9*   HCT 33.2* 37.1* 37.9*   MCV 98.8 99.5 98.4   MCH 32.1 31.9 33.5   MCHC 32.5 32.1 34.0   RDW 12.2 12.1 12.0   NEPRELIM 6.98 5.64 5.28   WBC 9.5 7.8 7.6   .0 262.0 249.0     No results for input(s): \"TROP\", \"CK\" in the last 168 hours.    DIAGNOSTICS:    TELEMETRY: SB/SR     ECHO 10/10/2024:  1. The left ventricle is normal in size. The left ventricular ejection fraction is 45%. Regional wall motion analysis shows hypokinesis of mid anterolateral segment and apical lateral segment. Wall motion  score index is 2. The left ventricle diastolic function is impaired (Grade II) with an elevated left atrial pressure.  Wall thickness is within normal limits.  2. The right ventricle is normal in size. Right ventricular systolic function is normal.  3.  The left atrium is mildly enlarged based on the left atrium volume index of 40.2ml/m².  4. Mitral regurgitation is noted. Trace mitral regurgitation.  5. The study quality is below average.     ROS: Negative unless noted above     PHYSICAL EXAM:  General: Alert and oriented x 3. No apparent distress.  HEENT: Normocephalic, sclera are nonicteric. Hearing appropriate bilaterally.  Neck: No JVD or Carotid bruits. Trachea midline.   Cardiac: Regular rate and rhythm. S1, S2 auscultated. No murmurs, rubs, or gallops appreciated.   Lungs: Clear without wheezes, rales, rhonchi or dullness. Chest expansion symmetrical. Regular effort.  Abdomen: Soft, non-tender, +BS. No hepatosplenomegaly or appreciable masses.   Extremities: Without clubbing, cyanosis or edema. Peripheral pulses are 2+.  Neurologic: Motor and sensory nerves grossly intact.   Psych: Appropriate affect   Skin: Warm and dry. Right wrist soft without bruit/ bruising/ hematoma     MEDICATIONS:   vancomycin  1,500 mg Intravenous Q18H    insulin degludec  25 Units Subcutaneous Nightly    torsemide  20 mg Oral Daily    rosuvastatin  40 mg Oral Nightly    insulin aspart  1-7 Units Subcutaneous TID CC and HS    cefTRIAXone  2 g Intravenous Q24H    sennosides  8.6 mg Oral BID    docusate sodium  100 mg Oral BID    latanoprost  1 drop Both Eyes Nightly    heparin  5,000 Units Subcutaneous 2 times per day    aspirin  81 mg Oral Daily     ASSESSMENT:    Presented for scheduled LHC on 11/14/24 which was revealing for severe triple-vessel disease with mild-mod LV dysfunction. Admitted for treatment of LE wound and cellulitis. Evaluated by CV surgery. Plans for surgery but timing to be determined, CVS will see how infection  resolves.     CAD  - Multivessel disease & CVS consult as above   - LVEF on ECHO preserved, LVEF on LHC 45%  - On BB, high intensity statin, Aspirin. 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 s/p IV lasix now compensated on PO Torsemide     LE Cellulitus/ LLE w/ fifth toe OM  - ID and podiatry following, bone biopsy this evening   - ABX     HLD-  Dec 2023  Type II DM- A1c 8.9%     PLAN:  - Bone biopsy this evening with podiatry, timing of revascularization to be determined dependent on infection   - Repeat lipid panel   - Start Losartan     Plan of care discussed with patient and RN.     Cori Rico, VELASQUEZ  11/19/2024  11:18 AM  [phone] ([place])  [phone] ([place])        Cardiologist Addendum:  Davin Hassan was seen and examined independently and I agree with the above documentation provided by AZIZA Rob.  Patient feels well, planning to go bone biopsy at today.  Continue current cardiac conditions, titrate GDMT as tolerated.  Is having per CT surgery and ID.    Thank you for allowing me to take part in the care of Davin Hassan. Please call with any questions of concerns.    L3    Jose Foote DO  [facility]   Interventional Cardiac and Vascular Services      November 19, 2024  4:48 PM

## 2024-11-19 NOTE — PROGRESS NOTES
INFECTIOUS DISEASE PROGRESS NOTE  [facility]  part of [facility] ID PROGRESS NOTE    Davin Hassan Patient Status:  Inpatient    1955 MRN [id number]   Location [facility] 3W/SW Attending Sofia Boyce MD   Hosp Day # 5 PCP Susana Magallon MD     Subjective:  ROS reviewed. No new complaints. Still having drainage from the toe.    ASSESSMENT:    Antibiotics: Vancomycin, ceftriaxone     # Acute LLE cellulitis with fifth toe ulceration, MRI c/w abscess with OM               -On PO doxycycline and augmentin PTA  # Multivessel CAD               -CV surgery following  # PAD  # Diabetes mellitus     PLAN:  -  Continue on vancomycin and ceftriaxone.  -  Awaiting bone biopsy.   -  Follow fever curve, wbc.  -  Reviewed labs, micro, imaging reports, available old records.  -  Case d/w patient, RN.     History of Present Illness:  Davin Hassan is a 68 year old male with a history of diabetes mellitus, PAD, who was recently on a trip to [place] and was hospitalized in [place] for LLE cellulitis along with CHF given dyspnea on exertion, was given augmentin and diuretics and flew home. Developed a callus on his fifth toe which he states was draining. Saw podiatry this past Wednesday and started on doxycycline. Had planned LHC yesterday with findings of severe triple vessel disease and seen by CV surgery with concerns regarding L foot ulcer. Vancomycin started and plans for L foot MRI. ID consulted.    Physical Exam:  /57 (BP Location: Right arm)   Pulse 60   Temp 98 °F (36.7 °C) (Oral)   Resp 18   Ht 6' 1\" (1.854 m)   Wt 205 lb 9.6 oz (93.3 kg)   SpO2 94%   BMI 27.13 kg/m²     Gen:   Awake, in chair  HEENT:  EOMI, neck supple  CV/lungs:  Regular rate and rhythm, CTAB  Abdom:  Soft, no TTP  Skin/extrem:  Improved LLE erythema, L fifth toe with macerated tissue with purulent drainage  Lines:  PIV+    Laboratory Data: Reviewed    Microbiology:  Reviewed    Radiology: Reviewed      [facility]  [phone]  11/19/2024

## 2024-11-19 NOTE — PROGRESS NOTES
Progress Note     Davin Hassan Patient Status:  Observation    1955 MRN [id number]   Location [facility] 3W/SW Attending Sofia Boyce MD   Hosp Day # 5 PCP Susana Magallon MD     Chief Complaint: patient presented with No chief complaint on file.      Subjective:   S: Patient denies any cp, no sob, no  n/v, foot feels same.    Review of Systems:   10 point ROS completed and was negative, except for pertinent positive and negatives stated in subjective.    Objective:   Vital signs:  Temp:  [97.9 °F (36.6 °C)-98.1 °F (36.7 °C)] 97.9 °F (36.6 °C)  Pulse:  [57-63] 57  Resp:  [16-20] 18  BP: (115-128)/(57-66) 115/66  SpO2:  [94 %-97 %] 96 %    Wt Readings from Last 6 Encounters:   24 205 lb 9.6 oz (93.3 kg)   23 224 lb (101.6 kg)   22 230 lb 6.4 oz (104.5 kg)   22 230 lb (104.3 kg)   22 224 lb (101.6 kg)   19 221 lb (100.2 kg)         Physical Exam:    General: No acute distress. Alert ,         Respiratory: Clear to auscultation bilaterally. No wheezes. No rhonchi.  Cardiovascular: S1, S2. Regular rate and rhythm. No murmurs, rubs or gallops.   Abdomen: Soft, nontender, nondistended.  Positive bowel sounds. No rebound or guarding.  Neurologic: No focal neurological deficits.   Musculoskeletal: Moves all extremities.  Extremities: No edema. LLE swollen-chronic.     Results:   Diagnostic Data:      Labs:    Labs Last 24 Hours:   BMP     CBC    Other     Na 141 Cl 103 BUN 24 Glu 119   Hb 12.9   PTT - Procal -   K 3.9 CO2 33.0 Cr 0.93   WBC 7.6 >< .0  INR - CRP -   Renal Lytes Endo    Hct 37.9   Trop - D dim -   eGFR - Ca 9.8 POC Gluc  144    LFT   pBNP - Lactic -   eGFR AA - PO4 - A1c -   AST - APk - Prot -  LDL -     Mg - TSH -   ALT - T paige - Alb -        COVID-19 Lab Results    COVID-19  Lab Results   Component Value Date    COVID19 Detected (A) 2022       Pro-Calcitonin  No results for input(s): \"PCT\" in the last 168 hours.    Cardiac  No  results for input(s): \"TROP\", \"PBNP\" in the last 168 hours.    Creatinine Kinase  No results for input(s): \"CK\" in the last 168 hours.    Inflammatory Markers  No results for input(s): \"CRP\", \"MESERET\", \"LDH\", \"DDIMER\" in the last 168 hours.    Imaging: Imaging data reviewed in Epic.    Medications:    losartan  25 mg Oral Daily    vancomycin  1,500 mg Intravenous Q18H    insulin degludec  25 Units Subcutaneous Nightly    torsemide  20 mg Oral Daily    rosuvastatin  40 mg Oral Nightly    insulin aspart  1-7 Units Subcutaneous TID CC and HS    cefTRIAXone  2 g Intravenous Q24H    sennosides  8.6 mg Oral BID    docusate sodium  100 mg Oral BID    latanoprost  1 drop Both Eyes Nightly    [Held by provider] heparin  5,000 Units Subcutaneous 2 times per day    aspirin  81 mg Oral Daily     MRI FOOT   There is a fluid collection along the dorsal aspect of the 5th metatarsophalangeal joint measuring approximately 3 x 3 x 1 cm, with surrounding inflammation.  The collection may represent abscess , and the surrounding soft tissue inflammation suggests   cellulitis.  There are signal changes within the 5th metatarsal head as well as the 5th proximal and middle phalanxes, compatible with early osteomyelitis       Assessment & Plan:   ASSESSMENT / PLAN:     Problem List Items Addressed This Visit          Health Encounters    Pre-op testing - Primary    Relevant Orders    Basic Metabolic Panel (8) (Completed)    CBC, Platelet; No Differential (Completed)    XR CHEST PA + LAT CHEST (Completed)     Other Visit Diagnoses       Abnormal nuclear stress test        Relevant Medications    heparin (Porcine) 1000 UNIT/ML injection (Completed)    fentaNYL (Sublimaze) 50 mcg/mL injection (Completed)    verapamil (Isoptin) 2.5 mg/mL injection (Completed)    lidocaine PF (Xylocaine-MPF) 2 % injection (Completed)    Nitroglycerin in D5W 200-5 MCG/ML-% injection (Completed)    aspirin 81 MG Oral Tab EC    atorvastatin 80 MG Oral Tab     metoprolol tartrate 25 MG Oral Tab    heparin (Porcine) 5000 UNIT/ML injection 5,000 Units    aspirin DR tab 81 mg    torsemide (Demadex) tab 20 mg    rosuvastatin (Crestor) tab 40 mg    losartan (Cozaar) tab 25 mg    Other Relevant Orders    CATH LEFT AND RIGHT CATH W/INTERVENTION (Completed)    Abnormal echocardiogram        Relevant Medications    heparin (Porcine) 1000 UNIT/ML injection (Completed)    verapamil (Isoptin) 2.5 mg/mL injection (Completed)    lidocaine PF (Xylocaine-MPF) 2 % injection (Completed)    Nitroglycerin in D5W 200-5 MCG/ML-% injection (Completed)    aspirin 81 MG Oral Tab EC    atorvastatin 80 MG Oral Tab    metoprolol tartrate 25 MG Oral Tab    heparin (Porcine) 5000 UNIT/ML injection 5,000 Units    aspirin DR tab 81 mg    magnesium hydroxide (Milk of Magnesia) 400 MG/5ML oral suspension 30 mL    torsemide (Demadex) tab 20 mg    rosuvastatin (Crestor) tab 40 mg    losartan (Cozaar) tab 25 mg    Other Relevant Orders    CATH LEFT AND RIGHT CATH W/INTERVENTION (Completed)              67 y/o male with Pmhx of Insulin-dependent diabetes mellitus type 2, hyperlipidemia, peripheral diabetic neuropathy, glaucoma, and peripheral arterial disease.he was evaluated by Cardiology and had a stress test which was abnormal.  Today, he had a cardiac angiogram done by his cardiologist, Dr. Barone, which showed reportedly multivessel coronary artery disease.  Patient will be admitted to the hospital for further cardiovascular surgery evaluation.       Multivessel CAD  CHF-Stable   -Cardiology and Cardiothoracic surgery consulted   -on PO Torsemide,   -plan CABG once infection is cleared         LLE cellulitis with fifth toe ulceration, OM , abscess  -ID following ->IV vanc and ceftriaxone  -MRI -osteo in 5th toe ->plan for bone bx today  -Podiatry consulted -Pt follows with Dr Bowen Jeffries -consulted however they are on staff, consulted Dr Dow (podiatry on call)  -tonie reviewed      DM  SSI      Quality:  DVT Prophylaxis: heparin  CODE status: FULL   DISPO: pending clinical improvement.   Estimated date of discharge: To be determined  Discharge is dependent on: Improved clinical status  At this point Patient is expected to be discharge to: Home versus rehab      Plan of care discussed with Patient and RN.     Coordinated care with providers and counseling re: treatment plan and workup     Sofia Boyce MD    Supplementary Documentation:         **Certification      PHYSICIAN Certification of Need for Inpatient Hospitalization - Initial Certification    Patient will require inpatient services that will reasonably be expected to span two midnight's based on the clinical documentation in H+P.   Based on patients current state of illness, I anticipate that, after discharge, patient will require TBD.         MDM: High, acute illness/severe exacerbation of chronic illness posing threat to life.  IV medications requiring close inpatient monitoring

## 2024-11-20 LAB
ANION GAP SERPL CALC-SCNC: 1 MMOL/L (ref 0–18)
BASOPHILS # BLD AUTO: 0.05 X10(3) UL (ref 0–0.2)
BASOPHILS NFR BLD AUTO: 0.5 %
BUN BLD-MCNC: 24 MG/DL (ref 9–23)
BUN/CREAT SERPL: 23.5 (ref 10–20)
CALCIUM BLD-MCNC: 9.8 MG/DL (ref 8.7–10.4)
CHLORIDE SERPL-SCNC: 104 MMOL/L (ref 98–112)
CO2 SERPL-SCNC: 33 MMOL/L (ref 21–32)
CREAT BLD-MCNC: 1.02 MG/DL
DEPRECATED RDW RBC AUTO: 44.3 FL (ref 35.1–46.3)
EGFRCR SERPLBLD CKD-EPI 2021: 81 ML/MIN/1.73M2 (ref 60–?)
EOSINOPHIL # BLD AUTO: 0.24 X10(3) UL (ref 0–0.7)
EOSINOPHIL NFR BLD AUTO: 2.2 %
ERYTHROCYTE [DISTWIDTH] IN BLOOD BY AUTOMATED COUNT: 12.1 % (ref 11–15)
GLUCOSE BLD-MCNC: 143 MG/DL (ref 70–99)
GLUCOSE BLDC GLUCOMTR-MCNC: 133 MG/DL (ref 70–99)
GLUCOSE BLDC GLUCOMTR-MCNC: 196 MG/DL (ref 70–99)
GLUCOSE BLDC GLUCOMTR-MCNC: 250 MG/DL (ref 70–99)
GLUCOSE BLDC GLUCOMTR-MCNC: 250 MG/DL (ref 70–99)
HCT VFR BLD AUTO: 38.2 %
HGB BLD-MCNC: 12.5 G/DL
IMM GRANULOCYTES # BLD AUTO: 0.04 X10(3) UL (ref 0–1)
IMM GRANULOCYTES NFR BLD: 0.4 %
LYMPHOCYTES # BLD AUTO: 1.34 X10(3) UL (ref 1–4)
LYMPHOCYTES NFR BLD AUTO: 12.4 %
MCH RBC QN AUTO: 32.6 PG (ref 26–34)
MCHC RBC AUTO-ENTMCNC: 32.7 G/DL (ref 31–37)
MCV RBC AUTO: 99.7 FL
MONOCYTES # BLD AUTO: 0.84 X10(3) UL (ref 0.1–1)
MONOCYTES NFR BLD AUTO: 7.8 %
NEUTROPHILS # BLD AUTO: 8.32 X10 (3) UL (ref 1.5–7.7)
NEUTROPHILS # BLD AUTO: 8.32 X10(3) UL (ref 1.5–7.7)
NEUTROPHILS NFR BLD AUTO: 76.7 %
NT-PROBNP SERPL-MCNC: 1087 PG/ML (ref ?–125)
OSMOLALITY SERPL CALC.SUM OF ELEC: 293 MOSM/KG (ref 275–295)
PLATELET # BLD AUTO: 262 10(3)UL (ref 150–450)
POTASSIUM SERPL-SCNC: 4.2 MMOL/L (ref 3.5–5.1)
RBC # BLD AUTO: 3.83 X10(6)UL
SODIUM SERPL-SCNC: 138 MMOL/L (ref 136–145)
VANCOMYCIN TROUGH SERPL-MCNC: 15.7 UG/ML (ref 10–20)
WBC # BLD AUTO: 10.8 X10(3) UL (ref 4–11)

## 2024-11-20 PROCEDURE — 99233 SBSQ HOSP IP/OBS HIGH 50: CPT | Performed by: INTERNAL MEDICINE

## 2024-11-20 RX ORDER — VANCOMYCIN HYDROCHLORIDE
1500 EVERY 24 HOURS
Status: DISCONTINUED | OUTPATIENT
Start: 2024-11-21 | End: 2024-11-21

## 2024-11-20 RX ADMIN — LOSARTAN POTASSIUM 25 MG: 25 TABLET ORAL at 08:36:00

## 2024-11-20 RX ADMIN — LATANOPROST 1 DROP: 50 SOLUTION/ DROPS OPHTHALMIC at 20:53:00

## 2024-11-20 RX ADMIN — VANCOMYCIN HYDROCHLORIDE 1500 MG: at 15:43:00

## 2024-11-20 RX ADMIN — DOCUSATE SODIUM 100 MG: 100 CAPSULE, LIQUID FILLED ORAL at 20:53:00

## 2024-11-20 RX ADMIN — SENNOSIDES 8.6 MG: 8.6 MG TABLET ORAL at 20:53:00

## 2024-11-20 RX ADMIN — ASPIRIN 81 MG: 81 TABLET ORAL at 08:36:00

## 2024-11-20 RX ADMIN — ROSUVASTATIN CALCIUM 40 MG: 20 TABLET, COATED ORAL at 20:53:00

## 2024-11-20 RX ADMIN — ACETAMINOPHEN 500 MG: 500 MG TABLET ORAL at 17:46:00

## 2024-11-20 RX ADMIN — INSULIN DEGLUDEC 25 UNITS: 100 INJECTION, SOLUTION SUBCUTANEOUS at 20:53:00

## 2024-11-20 RX ADMIN — ACETAMINOPHEN 500 MG: 500 MG TABLET ORAL at 06:07:00

## 2024-11-20 RX ADMIN — DOCUSATE SODIUM 100 MG: 100 CAPSULE, LIQUID FILLED ORAL at 09:00:00

## 2024-11-20 RX ADMIN — HEPARIN SODIUM 5000 UNITS: 5000 INJECTION, SOLUTION INTRAVENOUS; SUBCUTANEOUS at 20:53:00

## 2024-11-20 RX ADMIN — TORSEMIDE 20 MG: 20 TABLET ORAL at 08:36:00

## 2024-11-20 RX ADMIN — SENNOSIDES 8.6 MG: 8.6 MG TABLET ORAL at 08:36:00

## 2024-11-20 NOTE — PLAN OF CARE
Patient is alert and oriented times 4. On RA. No Complains of some pain, relieved by Tylenol. Plan to wait for biopsy pending antibiotic course. Podiatry to do first dressing post biopsy procedure later on tonight.     Problem: Diabetes/Glucose Control  Goal: Glucose maintained within prescribed range  Description: INTERVENTIONS:  - Monitor Blood Glucose as ordered  - Assess for signs and symptoms of hyperglycemia and hypoglycemia  - Administer ordered medications to maintain glucose within target range  - Assess barriers to adequate nutritional intake and initiate nutrition consult as needed  - Instruct patient on self management of diabetes  Outcome: Progressing     Problem: Patient Centered Care  Goal: Patient preferences are identified and integrated in the patient's plan of care  Description: Interventions:  - What would you like us to know as we care for you? I'm from home with my wife  - Provide timely, complete, and accurate information to patient/family  - Incorporate patient and family knowledge, values, beliefs, and cultural backgrounds into the planning and delivery of care  - Encourage patient/family to participate in care and decision-making at the level they choose  - Honor patient and family perspectives and choices  Outcome: Progressing     Problem: Patient/Family Goals  Goal: Patient/Family Short Term Goal  Description: Patient's Short Term Goal: manage pain    Interventions:   - Frequent pain assessments  - PRN pain meds  - non-pharmacological interventions  - See additional Care Plan goals for specific interventions  Outcome: Progressing     Problem: METABOLIC/FLUID AND ELECTROLYTES - ADULT  Goal: Glucose maintained within prescribed range  Description: INTERVENTIONS:  - Monitor Blood Glucose as ordered  - Assess for signs and symptoms of hyperglycemia and hypoglycemia  - Administer ordered medications to maintain glucose within target range  - Assess barriers to adequate nutritional intake and  initiate nutrition consult as needed  - Instruct patient on self management of diabetes  Outcome: Progressing

## 2024-11-20 NOTE — PROGRESS NOTES
Progress Note  Davin Hassan Patient Status:  Inpatient    1955 MRN [id number]   Location [facility] 3W/SW Attending Sofia Boyce MD   Hosp Day # 6 PCP Susana Magallon MD     SUBJECTIVE:    Denies chest pain or dyspnea. No numbness or tingling to RUE. Would like to discharge home and follow up with CV surgery outpatient.     VITALS:  /44 (BP Location: Right arm)   Pulse 65   Temp 98.6 °F (37 °C) (Oral)   Resp 20   Ht 6' 1\" (1.854 m)   Wt 205 lb 9.6 oz (93.3 kg)   SpO2 94%   BMI 27.13 kg/m²     INTAKE/OUTPUT:    Intake/Output Summary (Last 24 hours) at 2024 0956  Last data filed at 2024 0726  Gross per 24 hour   Intake 800.84 ml   Output 1152 ml   Net -351.16 ml     Last 3 Weights   24 0458 205 lb 9.6 oz (93.3 kg)   24 0503 208 lb 1.6 oz (94.4 kg)   24 0317 209 lb 8 oz (95 kg)   24 0529 213 lb 9.6 oz (96.9 kg)   11/15/24 0502 221 lb 3.2 oz (100.3 kg)   24 1334 229 lb (103.9 kg)   24 1128 229 lb (103.9 kg)   24 1740 230 lb (104.3 kg)   10/24/24 1609 210 lb (95.3 kg)   23 0810 224 lb (101.6 kg)   22 1538 230 lb 6.4 oz (104.5 kg)     LABS:  Recent Labs   Lab 24  0501 24  0716 24  0641   * 119* 143*   BUN * * 24*   CREATSERUM 1.06 0.93 1.02   EGFRCR 77 91 81   CA 9.8 9.8 9.8    141 138   K 4.1 3.9 4.2    103 104   CO2 37.0* 33.0* 33.0*     Recent Labs   Lab 24  0501 24  0716 24  0641   RBC 3.73* 3.85 3.83   HGB 11.9* 12.9* 12.5*   HCT 37.1* 37.9* 38.2*   MCV 99.5 98.4 99.7   MCH 31.9 33.5 32.6   MCHC 32.1 34.0 32.7   RDW 12.1 12.0 12.1   NEPRELIM 5.64 5.28 8.32*   WBC 7.8 7.6 10.8   .0 249.0 262.0     No results for input(s): \"TROP\", \"CK\" in the last 168 hours.    DIAGNOSTICS:    TELEMETRY: SB/SR     ECHO 10/10/2024:  1. The left ventricle is normal in size. The left ventricular ejection fraction is 45%. Regional wall motion analysis shows  hypokinesis of mid anterolateral segment and apical lateral segment. Wall motion score index is 2. The left ventricle diastolic function is impaired (Grade II) with an elevated left atrial pressure.  Wall thickness is within normal limits.  2. The right ventricle is normal in size. Right ventricular systolic function is normal.  3.  The left atrium is mildly enlarged based on the left atrium volume index of 40.2ml/m².  4. Mitral regurgitation is noted. Trace mitral regurgitation.  5. The study quality is below average.       [facility] 11/14/2024:   Coronary anatomy   1) Left Ventriculography at 30 degrees ISAACS: LVEF: 45% with mid anterior and apical hypokinesis  2) Hemodynamics: LVEDP: 14 mmHg, no gradient across aortic valve  3) Coronaries:  Left main is long and free of obstructive disease  LAD is diffusely diseased with subtotal occlusion in the midsegment at the origin of a large diagonal branch, supplies 2 diagonals.  First diagonal is subtotally occluded in its medium to large vessel  Ramus: Medium to large vessel with 80 to 90% stenosis in the proximal segment  Cx subtotally occluded and small to medium vessel, supplies 1 OM branches which are patent  RCA is dominant and occluded in the midsegment with right to right faint collaterals, supplies PDA and PL    ROS: Negative unless noted above     PHYSICAL EXAM:  General: Alert and oriented x 3. No apparent distress.  HEENT: Normocephalic, sclera are nonicteric. Hearing appropriate bilaterally.  Neck: No JVD or Carotid bruits. Trachea midline.   Cardiac: Regular rate and rhythm. S1, S2 auscultated. No murmurs, rubs, or gallops appreciated.   Lungs: Clear without wheezes, rales, rhonchi or dullness. Chest expansion symmetrical. Regular effort.  Abdomen: Soft, non-tender, +BS. No hepatosplenomegaly or appreciable masses.   Extremities: Without clubbing, cyanosis or edema. Peripheral pulses are 2+.  Neurologic: Motor and sensory nerves grossly intact.   Psych: Appropriate  affect   Skin: Warm and dry. Right wrist soft without bruit/ bruising/ hematoma     MEDICATIONS:   losartan  25 mg Oral Daily    vancomycin  1,500 mg Intravenous Q18H    insulin degludec  25 Units Subcutaneous Nightly    torsemide  20 mg Oral Daily    rosuvastatin  40 mg Oral Nightly    insulin aspart  1-7 Units Subcutaneous TID CC and HS    cefTRIAXone  2 g Intravenous Q24H    sennosides  8.6 mg Oral BID    docusate sodium  100 mg Oral BID    latanoprost  1 drop Both Eyes Nightly    [Held by provider] heparin  5,000 Units Subcutaneous 2 times per day    aspirin  81 mg Oral Daily      dextrose Stopped (11/19/24 1508)   ASSESSMENT:    Presented for scheduled LHC on 11/14/24 which was revealing for severe triple-vessel disease with mild-mod LV dysfunction. Admitted for treatment of LE wound and cellulitis. Evaluated by CV surgery. Plans for surgery but timing to be determined, CVS will see how infection resolves.     CAD  - Multivessel disease as above & CVS consult as above   - LVEF on ECHO preserved, LVEF on LHC 45%  - On BB, high intensity statin, Aspirin. Started ARB yesterday, tolerating     Acute on Chronic HFmrEF, LVEF 45%  - 11/14/24 RHC: PCW 12, CI 3.6. s/p IV lasix now compensated on PO Torsemide     LE Cellulitus/ LLE w/ fifth toe OM  - ID and podiatry following, bone biopsy 11/19: no nectroic tissue, purulent drainage necrotic bone noted, pathology pending   - ABX     HLD-  on Crestor 40 mg, new to statin therapy this stay   Type II DM- A1c 8.9%     PLAN:  - Pathology pending for bone biopsy. Per patient ID saw him this morning and relayed that he will need to be on ABX for two weeks, prelim patho is without growth   - Timing for CVS to be determines, will discuss with attending   - Check proBNP for baseline     Plan of care discussed with patient and RN.     Cori Rico, VELASQUEZ  11/19/2024  11:18 AM  [phone] ([place])  [phone] ([place])        \

## 2024-11-20 NOTE — PROGRESS NOTES
INFECTIOUS DISEASE PROGRESS NOTE  [facility]  part of [facility] ID PROGRESS NOTE    Davin Hassan Patient Status:  Inpatient    1955 MRN [id number]   Location [facility] 3W/SW Attending Sofia Boyce MD   Hosp Day # 6 PCP Susana Magallon MD     Subjective:  ROS reviewed. No new complaints. Still having drainage from the toe.    ASSESSMENT:    Antibiotics: Vancomycin, ceftriaxone     # Acute LLE cellulitis with fifth toe ulceration, MRI c/w ?abscess with possible early OM               -On PO doxycycline and augmentin PTA   -s/p bone biopsy , cx Serratia  # Multivessel CAD               -CV surgery following  # PAD  # Diabetes mellitus     PLAN:  -  Continue on vancomycin and ceftriaxone.  -  Awaiting bone biopsy path.  -  Follow fever curve, wbc.  -  Reviewed labs, micro, imaging reports, available old records.  -  Case d/w patient, wife, RN.     History of Present Illness:  Davin Hassan is a 68 year old male with a history of diabetes mellitus, PAD, who was recently on a trip to [place] and was hospitalized in [place] for LLE cellulitis along with CHF given dyspnea on exertion, was given augmentin and diuretics and flew home. Developed a callus on his fifth toe which he states was draining. Saw podiatry this past Wednesday and started on doxycycline. Had planned C yesterday with findings of severe triple vessel disease and seen by CV surgery with concerns regarding L foot ulcer. Vancomycin started and plans for L foot MRI. ID consulted.    Physical Exam:  /44 (BP Location: Right arm)   Pulse 65   Temp 98.6 °F (37 °C) (Oral)   Resp 20   Ht 6' 1\" (1.854 m)   Wt 205 lb 9.6 oz (93.3 kg)   SpO2 94%   BMI 27.13 kg/m²     Gen:   Awake, in chair  HEENT:  EOMI, neck supple  CV/lungs:  Regular rate and rhythm, CTAB  Abdom:  Soft, no TTP  Skin/extrem:  L foot with dressing intact  Lines:  PIV+    Laboratory Data:  Reviewed    Microbiology: Reviewed    Radiology: Reviewed      [facility]  [phone]  11/20/2024

## 2024-11-20 NOTE — CM/SW NOTE
Consulted w/ ID team about plan for Abx at DC: IV vs PO. Per Dr. Atkinson, plan for pt to stay tonight to see sensitivities.    Pt would prefer PO Abx if possible and would like to ideally avoid IV abx.      PLAN: TBD - pending Abx needs & med clear      SW/CM to remain available for support and/or discharge planning.         Vaishali, MSW, LSW [id number]

## 2024-11-20 NOTE — PROGRESS NOTES
Progress Note     Davin Hassan Patient Status:  Observation    1955 MRN [id number]   Location [facility] 3W/SW Attending Sofia Boyce MD   Hosp Day # 6 PCP Susana Magallon MD     Chief Complaint: patient presented with No chief complaint on file.      Subjective:   S: Patient denies any cp, no sob, no  n/v, foot feels same.+drainage    Review of Systems:   10 point ROS completed and was negative, except for pertinent positive and negatives stated in subjective.    Objective:   Vital signs:  Temp:  [97.8 °F (36.6 °C)-98.6 °F (37 °C)] 98.6 °F (37 °C)  Pulse:  [55-68] 68  Resp:  [16-22] 18  BP: ()/(44-64) 110/57  SpO2:  [92 %-100 %] 98 %    Wt Readings from Last 6 Encounters:   24 205 lb 9.6 oz (93.3 kg)   23 224 lb (101.6 kg)   22 230 lb 6.4 oz (104.5 kg)   22 230 lb (104.3 kg)   22 224 lb (101.6 kg)   19 221 lb (100.2 kg)         Physical Exam:    General: No acute distress. Alert ,         Respiratory: Clear to auscultation bilaterally. No wheezes. No rhonchi.  Cardiovascular: S1, S2. Regular rate and rhythm. No murmurs, rubs or gallops.   Abdomen: Soft, nontender, nondistended.  Positive bowel sounds. No rebound or guarding.  Neurologic: No focal neurological deficits.   Musculoskeletal: Moves all extremities.  Extremities: No edema. LLE swollen-chronic.     Results:   Diagnostic Data:      Labs:    Labs Last 24 Hours:   BMP     CBC    Other     Na 138 Cl 104 BUN 24 Glu 143   Hb 12.5   PTT - Procal -   K 4.2 CO2 33.0 Cr 1.02   WBC 10.8 >< .0  INR - CRP -   Renal Lytes Endo    Hct 38.2   Trop - D dim -   eGFR - Ca 9.8 POC Gluc  196    LFT   pBNP 1,087 Lactic -   eGFR AA - PO4 - A1c -   AST - APk - Prot -  LDL -     Mg - TSH -   ALT - T paige - Alb -        COVID-19 Lab Results    COVID-19  Lab Results   Component Value Date    COVID19 Detected (A) 2022       Pro-Calcitonin  No results for input(s): \"PCT\" in the last 168  hours.    Cardiac  Recent Labs   Lab 11/20/24  0641   PBNP 1,087*       Creatinine Kinase  No results for input(s): \"CK\" in the last 168 hours.    Inflammatory Markers  No results for input(s): \"CRP\", \"MESERET\", \"LDH\", \"DDIMER\" in the last 168 hours.    Imaging: Imaging data reviewed in Epic.    Medications:    losartan  25 mg Oral Daily    vancomycin  1,500 mg Intravenous Q18H    insulin degludec  25 Units Subcutaneous Nightly    torsemide  20 mg Oral Daily    rosuvastatin  40 mg Oral Nightly    insulin aspart  1-7 Units Subcutaneous TID CC and HS    cefTRIAXone  2 g Intravenous Q24H    sennosides  8.6 mg Oral BID    docusate sodium  100 mg Oral BID    latanoprost  1 drop Both Eyes Nightly    heparin  5,000 Units Subcutaneous 2 times per day    aspirin  81 mg Oral Daily     MRI FOOT   There is a fluid collection along the dorsal aspect of the 5th metatarsophalangeal joint measuring approximately 3 x 3 x 1 cm, with surrounding inflammation.  The collection may represent abscess , and the surrounding soft tissue inflammation suggests   cellulitis.  There are signal changes within the 5th metatarsal head as well as the 5th proximal and middle phalanxes, compatible with early osteomyelitis       Assessment & Plan:   ASSESSMENT / PLAN:     Problem List Items Addressed This Visit          Health Encounters    Pre-op testing - Primary    Relevant Orders    Basic Metabolic Panel (8) (Completed)    CBC, Platelet; No Differential (Completed)    XR CHEST PA + LAT CHEST (Completed)     Other Visit Diagnoses       Abnormal nuclear stress test        Relevant Medications    heparin (Porcine) 1000 UNIT/ML injection (Completed)    fentaNYL (Sublimaze) 50 mcg/mL injection (Completed)    verapamil (Isoptin) 2.5 mg/mL injection (Completed)    lidocaine PF (Xylocaine-MPF) 2 % injection (Completed)    Nitroglycerin in D5W 200-5 MCG/ML-% injection (Completed)    aspirin 81 MG Oral Tab EC    atorvastatin 80 MG Oral Tab    metoprolol tartrate  25 MG Oral Tab    heparin (Porcine) 5000 UNIT/ML injection 5,000 Units    aspirin DR tab 81 mg    torsemide (Demadex) tab 20 mg    rosuvastatin (Crestor) tab 40 mg    losartan (Cozaar) tab 25 mg    Other Relevant Orders    CATH LEFT AND RIGHT CATH W/INTERVENTION (Completed)    Abnormal echocardiogram        Relevant Medications    heparin (Porcine) 1000 UNIT/ML injection (Completed)    verapamil (Isoptin) 2.5 mg/mL injection (Completed)    lidocaine PF (Xylocaine-MPF) 2 % injection (Completed)    Nitroglycerin in D5W 200-5 MCG/ML-% injection (Completed)    aspirin 81 MG Oral Tab EC    atorvastatin 80 MG Oral Tab    metoprolol tartrate 25 MG Oral Tab    heparin (Porcine) 5000 UNIT/ML injection 5,000 Units    aspirin DR tab 81 mg    magnesium hydroxide (Milk of Magnesia) 400 MG/5ML oral suspension 30 mL    torsemide (Demadex) tab 20 mg    rosuvastatin (Crestor) tab 40 mg    losartan (Cozaar) tab 25 mg    Other Relevant Orders    CATH LEFT AND RIGHT CATH W/INTERVENTION (Completed)              67 y/o male with Pmhx of Insulin-dependent diabetes mellitus type 2, hyperlipidemia, peripheral diabetic neuropathy, glaucoma, and peripheral arterial disease.he was evaluated by Cardiology and had a stress test which was abnormal.  Today, he had a cardiac angiogram done by his cardiologist, Dr. Barone, which showed reportedly multivessel coronary artery disease.  Patient will be admitted to the hospital for further cardiovascular surgery evaluation.       Multivessel CAD  CHF-Stable   -Cardiology and Cardiothoracic surgery consulted   -on PO Torsemide,   -plan CABG once infection is cleared         LLE cellulitis with fifth toe ulceration, OM , abscess  -ID following ->IV vanc and ceftriaxone  -MRI -osteo in 5th toe ->plan for bone bx completed 11/19   -f/u bone pathology and cx  -tonie reviewed      DM  SSI     Quality:  DVT Prophylaxis: heparin  CODE status: FULL   DISPO: pending clinical improvement.   Estimated date of  discharge: To be determined  Discharge is dependent on: Improved clinical status  At this point Patient is expected to be discharge to: Home versus rehab      Plan of care discussed with Patient and RN.     Coordinated care with providers and counseling re: treatment plan and workup     Sofia Boyce MD    Supplementary Documentation:         **Certification      PHYSICIAN Certification of Need for Inpatient Hospitalization - Initial Certification    Patient will require inpatient services that will reasonably be expected to span two midnight's based on the clinical documentation in H+P.   Based on patients current state of illness, I anticipate that, after discharge, patient will require TBD.         MDM: High, acute illness/severe exacerbation of chronic illness posing threat to life.  IV medications requiring close inpatient monitoring

## 2024-11-20 NOTE — BRIEF OP NOTE
Pre-Operative Diagnosis:   1.  Left fifth digit diabetic foot ulceration  2.  Left diabetic foot infection  3.  Left fifth metatarsal phalangeal joint concern for osteomyelitis and septic joint     Post-Operative Diagnosis:   1.  Left fifth digit diabetic foot ulceration  2.  Left diabetic foot infection  3.  Left fifth metatarsal phalangeal joint concern for osteomyelitis and septic joint     Procedure Performed:   1.  Left foot incision and drainage of fifth metatarsal phalangeal joint  2.  Left foot multiple bone biopsies of proximal phalanx as well as fifth metatarsal head left foot    Katelynn Dow D.P.M.     Surgical Findings: No necrotic tissue, purulent drainage necrotic bone noted     Specimen: Aerobic and anaerobic cultures from the fifth metatarsal phalangeal joint, bone biopsy of the fifth metatarsal head and base of proximal phalanx sent for shared specimen     Estimated Blood Loss: Blood Output: 2 mL (11/19/2024  7:21 PM)        Katelynn Dow DPM  11/19/2024  7:31 PM

## 2024-11-20 NOTE — DIETARY NOTE
NUTRITION EDUCATION NOTE     Pt screened with elevated A1C of 8.9 on 11/14/24. Knowledge assessment completed. Pt states he has been diabetic for a long time. Provided with Ready, Set, Start Counting handout to reinforce. Receptive to instruction. Also provided pt with \"Low Sodium Nutrition Therapy\" NCM handout to aid in reducing fluid retention at home. Would benefit from outpt f/u. Expect fair-good compliance.        Maris Vyas RD, LDN  Clinical Dietitian  P: [phone]

## 2024-11-20 NOTE — PROGRESS NOTES
[facility] Pharmacy Dosing Service      Vancomycin Dosing Adjustment for Pharmacokinetic Consult     Davin Hassan is a 68 year old male who is being initiated on vancomycin therapy for cellulitis.  Pharmacy has been asked to dose vancomycin by Dr. Ornelas.  The initial treatment and monitoring approach will be steady state AUC strategy.        Weight and Temperature:    Wt Readings from Last 1 Encounters:   24 93.3 kg (205 lb 9.6 oz)        Temp Readings from Last 1 Encounters:   24 98.6 °F (37 °C) (Oral)      Labs:   Recent Labs   Lab 24  0501 24  0716 24  0641   CREATSERUM 1.06 0.93 1.02      Estimated Creatinine Clearance: 80.5 mL/min (based on SCr of 1.02 mg/dL).     Recent Labs   Lab 24  0501 24  0716 24  0641   WBC 7.8 7.6 10.8          The Pharmacokinetic Target is:     to 600 mg-h/L and trough <=15 mg/L    Renal Dosing Considerations:    None     Assessment/Plan:   Initial/Loading dose: Has received 1500 mg IV (15 mg/kg, capped at 2250 mg) x 1 initial dose. Has received Q18H. As trough is 15.4 mcg/ml dose adjusted to Q24H.      Maintenance dose: Pharmacy will dose vancomycin at 1500 mg IV every 24 hours    Monitorin) Plan for vancomycin peak and trough to be obtained in approximately 72 hours    2) Pharmacy will order SCr as clinically indicated to assess renal function.    3) Pharmacy will monitor for toxicity and efficacy, adjust vancomycin dose and/or frequency, and order vancomycin levels as appropriate per the Pharmacy and Therapeutics Committee approved protocol until discontinuation of the medication.       We appreciate the opportunity to assist in the care of this patient.     Ai Dean, PharmD  2024  4:36 PM  [facility] Pharm[facility] Extension: [phone]

## 2024-11-20 NOTE — ANESTHESIA POSTPROCEDURE EVALUATION
Patient: Davin Hassan    Procedure Summary       Date: 11/19/24 Room / Location: [facility] MAIN OR  / [facility] MAIN OR    Anesthesia Start: 1843 Anesthesia Stop: 1930    Procedure: Left foot bone biopsy (Left: Foot) Diagnosis: (Osteomyelitis)    Surgeons: Katelynn Dow DPM Anesthesiologist: Maude Beverly MD    Anesthesia Type: MAC ASA Status: 3            Anesthesia Type: MAC    Vitals Value Taken Time   /57 11/19/24 1929   Temp 98.3 °F (36.8 °C) 11/19/24 1928   Pulse 57 11/19/24 1930   Resp 20 11/19/24 1930   SpO2 100 % 11/19/24 1930   Vitals shown include unfiled device data.    [facility] AN Post Evaluation:   Patient Evaluated in PACU  Patient Participation: complete - patient participated  Level of Consciousness: awake and alert  Pain Score: 0  Pain Management: adequate  Airway Patency:patent  Yes    Nausea/Vomiting: none  Cardiovascular Status: acceptable  Respiratory Status: acceptable  Postoperative Hydration acceptable      MAUDE BEVERLY MD  11/19/2024 7:30 PM

## 2024-11-20 NOTE — OPERATIVE REPORT
[facility]  part of [facility]    Operative Note         Davin Hassan Location: [facility] [id number] MRN [id number]   Admission Date 11/14/2024 Operation Date 11/19/2024   Attending Physician Sofia Boyce MD       Patient Name: Davin Hassan     Preoperative Diagnosis:   Left fifth digit diabetic foot ulceration  Left fifth metatarsal phalangeal joint concern for septic joint and cellulitis  Left fifth metatarsal phalangeal joint concern for osteomyelitis    Postoperative Diagnosis:   1.Left fifth digit diabetic foot ulceration  2.Left fifth metatarsal phalangeal joint concern for septic joint and cellulitis of the left foot  3.Left fifth metatarsal phalangeal joint concern for osteomyelitis      Procedure(s):  Left foot incision and drainage  Left foot multiple bone biopsies of proximal phalanx of left fifth digit and left fifth metatarsal head    Primary Surgeon: Katelynn Dow DPM           Anesthesia: MAC     Specimen:   ID Type Source Tests Collected by Time Destination   1 : 2. Left 5th proximal phalanx bone biopsy amd cultures Tissue Foot,left ANAEROBIC CULTURE, TISSUE AEROBIC CULTURE, SURGICAL PATHOLOGY TISSUE Katelynn Dow DPM 11/19/2024  7:10 PM    2 : 3. Left 5th metatarsal head bone biopsy and cultures Tissue Foot,left ANAEROBIC CULTURE, TISSUE AEROBIC CULTURE, SURGICAL PATHOLOGY TISSUE Katelynn Dow DPM 11/19/2024  7:10 PM    A : 1. Left 5th metatarsalphalangeal joint tissue cultures Tissue Foot,left ANAEROBIC CULTURE, TISSUE AEROBIC CULTURE Katelynn Dow DPM 11/19/2024  7:09 PM         Estimated Blood Loss: Blood Output: 2 mL (11/19/2024  7:21 PM)       Complications: none none    Indications for procedure: A 68-year-old diabetic male presents to the operating room complaining of a chronic ulceration of the left fifth digit with concern for underlying abscess osteomyelitis secondary to preoperative MRI.    Surgical Findings:  Purulent drainage necrotic bone noted to the fifth metatarsal and fifth proximal inner phalangeal joint        Operative Summary:    Patient was identified as Davin Hassan date of birth 12/11/1955 in the preoperative area.  Procedure was confirmed for left foot incision and drainage and bone biopsies of the left fifth metatarsal phalangeal joint.  Patient's left lower extremity was marked in preoperative area.  Patient was brought to the operating room placed on the operative table spine position.  After induction of MAC anesthesia timeouts form this time approximately 30 cc of one-to-one mixture of 0.5% Marcaine plain 1% lidocaine plain was injected patient's left foot in reverse Sim interlocked fashion.  The left lower extremity was then prepped scrubbed and draped in typical aseptic technique.    LEFT FOOT INCISION AND DRAINAGE:  Attention was then directed to the lateral aspect the left fifth metatarsal phalangeal joint where a chronic ulceration was subsequently noted on the left fifth proximal phalangeal joint approximately 1 cm x 2.5 cm needs ulceration was subsequently excised in total down to the level of the bone utilizing a 15 blade.  Upon complete excision of the ulceration no purulent drainage was noted in the proximal to phalangeal joint of the left foot.  Next a 4 cm incision was made over the dorsal lateral aspect of the left fifth metatarsal phalangeal joint corresponding to preoperative MRI which noted fluid as well as concern for osteomyelitis.  Incision was deepened through subcutaneous tissue using sharp and blunt dissection techniques care was taken to identify and retract all neurovasc structures all bleeders are cauterized necessary.  Upon complete dissection of the subcutaneous tissue to the level of the fifth metatarsal phalangeal joint both aerobic and anaerobic cultures were subsequently taken of the fluid in the fifth metatarsal phalangeal joint that did not appear to be purulent in  nature.      LEFT FIFTH METATARSAL HEAD AND PROXIMAL PHALANX BONE BIOPSIES:  Attention was then directed to the fifth metatarsal head in which a Cregg needle bone biopsy set was subsequent utilized in order to obtain a bone biopsy of the fifth metatarsal head.  The specimen was sent to both pathology and microbiology for further identification.  Next engine was then directed to the head of the proximal phalanx where it is.  To be exposed underneath the ulceration which was also subsequently resected and sent to pathology and microbiology for further identification.  All incisions were then irrigated with copious amounts of sterile saline the skin edges were closed and reapproximated utilizing 2-0 Prolene in a simple interrupted suture technique.  A dressing was applied consisting of Telfa 4 x 4 web roll Emilia and Coban.  Patient Toller procedure well and no complications was transferred the PACU vital signs stable vascular status intact to left foot.  Patient remain in house until bone biopsy and culture results are obtained.        Condition: Stable neurovascular status intact level digits left foot      Katelynn Vasquez DPM, D.FREEDOM DASILVA  Diplomat, American Board of Foot and Ankle Surgery  Certified in Foot and Rearfoot/Ankle Reconstruction  Fellow of the American College of Foot and Ankle Surgeons  Fellowship Trained Foot and Ankle Surgeon   [facility]   Katelynn Dow DPM

## 2024-11-20 NOTE — ANESTHESIA PREPROCEDURE EVALUATION
Anesthesia PreOp Note    HPI:     Davin Hassan is a 68 year old male who presents for preoperative consultation requested by: Katelynn Dow DPM    Date of Surgery: 11/14/2024 - 11/19/2024    Procedure(s):  Left foot bone biopsy  Indication: Osteomyelitis    Relevant Problems   No relevant active problems       NPO:  Last Liquid Consumption Date: 11/19/24  Last Liquid Consumption Time: 0740  Last Solid Consumption Date: 11/19/24  Last Solid Consumption Time: 0740  Last Liquid Consumption Date: 11/19/24          History Review:  Patient Active Problem List    Diagnosis Date Noted    Osteomyelitis of fifth toe of left foot (Formerly [facility]) 11/18/2024    Acute osteomyelitis of right foot (Formerly [facility]) 11/18/2024    Pre-op testing 11/15/2024    CAD (coronary artery disease) 11/14/2024    Primary open angle glaucoma (POAG) of both eyes 02/07/2020    Alternating esotropia 01/07/2019    Blepharitis of upper and lower eyelids of both eyes 01/07/2019    Myopia of both eyes with astigmatism and presbyopia 01/07/2019    Age-related nuclear cataract of both eyes 01/07/2019    BDR (background diabetic retinopathy) (Formerly [facility]) 01/07/2019    Diabetic macular edema of both eyes (Formerly [facility]) 01/01/2018       Past Medical History:    Alternating esotropia    Alternating esotropia    BDR (background diabetic retinopathy) (Formerly [facility])    under the care of Dr. Brito- see progress note    Colon adenomas    x4    Diabetic macular edema of left eye (Formerly [facility])    S/P Eylea injection OS x 5 with Dr. Brito    Glaucoma suspect of both eyes    Primary open angle glaucoma (POAG) of left eye, mild stage    2/4/19 start Latanoprost qhs OS due to thinning of the optic nerve on OCT    Type II or unspecified type diabetes mellitus without mention of complication, not stated as uncontrolled       Past Surgical History:   Procedure Laterality Date    Colonoscopy  03/2008    Colonoscopy  04/05/2023    Strabismus surgery Bilateral 1959       Prescriptions Prior to  Admission[1]  Current Medications and Prescriptions Ordered in Epic[2]    Allergies[3]    Family History   Problem Relation Age of Onset    Diabetes Father     Glaucoma Father     Macular degeneration Father     Breast Cancer Mother     Heart Disorder Brother      Social History     Socioeconomic History    Marital status:    Tobacco Use    Smoking status: Never    Smokeless tobacco: Never   Vaping Use    Vaping status: Never Used   Substance and Sexual Activity    Alcohol use: Yes     Alcohol/week: 1.0 standard drink of alcohol     Types: 1 Glasses of wine per week     Comment: rarely, beer & liquor, 1 drink     Drug use: No   Other Topics Concern    Caffeine Concern Yes     Comment: coffee, soda, 3 cups daily       Available pre-op labs reviewed.  Lab Results   Component Value Date    WBC 7.6 11/19/2024    RBC 3.85 11/19/2024    HGB 12.9 (L) 11/19/2024    HCT 37.9 (L) 11/19/2024    MCV 98.4 11/19/2024    MCH 33.5 11/19/2024    MCHC 34.0 11/19/2024    RDW 12.0 11/19/2024    .0 11/19/2024     Lab Results   Component Value Date     11/19/2024    K 3.9 11/19/2024     11/19/2024    CO2 33.0 (H) 11/19/2024    BUN 24 (H) 11/19/2024    CREATSERUM 0.93 11/19/2024     (H) 11/19/2024    PGLU 75 11/19/2024    CA 9.8 11/19/2024          Vital Signs:  Body mass index is 27.13 kg/m².   height is 1.854 m (6' 1\") and weight is 93.3 kg (205 lb 9.6 oz). His oral temperature is 97.9 °F (36.6 °C). His blood pressure is 115/66 and his pulse is 57. His respiration is 18 and oxygen saturation is 96%.   Vitals:    11/19/24 0458 11/19/24 0505 11/19/24 0907 11/19/24 1416   BP:  118/61 117/57 115/66   Pulse:  57 60 57   Resp:  16 18 18   Temp:  98 °F (36.7 °C) 98 °F (36.7 °C) 97.9 °F (36.6 °C)   TempSrc:  Oral Oral Oral   SpO2:  96% 94% 96%   Weight: 93.3 kg (205 lb 9.6 oz)      Height:            Anesthesia Evaluation     Patient summary reviewed and Nursing notes reviewed    No history of anesthetic  complications   Airway   Mallampati: III  TM distance: >3 FB  Neck ROM: full  Dental - Dentition appears grossly intact     Pulmonary - negative ROS and normal exam   Cardiovascular - normal exam  Exercise tolerance: good  (+) CAD    Rhythm: regular  Rate: normal    Neuro/Psych - negative ROS     GI/Hepatic/Renal - negative ROS     Endo/Other    (+) diabetes mellitus type 2 well controlled using insulin  Abdominal  - normal exam                 Anesthesia Plan:   ASA:  3  Plan:   MAC  Post-op Pain Management: Oral pain medication and IV analgesics  Informed Consent Plan and Risks Discussed With:  Patient      I have informed Davin Saurabh Hassan of the nature of the anesthetic plan, benefits, risks including possible dental damage if relevant, major complications, and any alternative forms of anesthetic management.   All of the patient's questions were answered to the best of my ability. The patient desires the anesthetic management as planned.  Ashley Xiong MD, PhD  11/19/2024 6:23 PM  Present on Admission:  **None**           [1]   Medications Prior to Admission   Medication Sig Dispense Refill Last Dose/Taking    doxycycline 100 MG Oral Cap Take 1 capsule (100 mg total) by mouth 2 (two) times daily.   11/13/2024    AMOXICILLIN OR Take 1 g by mouth in the morning and 1 g before bedtime. Day 4 of Day 7   2 pills morning and evening.   11/13/2024    acetaminophen 500 MG Oral Tab Take 1 tablet (500 mg total) by mouth every 6 (six) hours as needed for Pain.   Past Week    insulin degludec 100 units/mL Subcutaneous Solution Pen-injector Inject 28 Units into the skin nightly.   11/13/2024    latanoprost 0.005 % Ophthalmic Solution INSTILL 1 DROP INTO BOTH   EYES EVERY NIGHT 7.5 mL 3 11/13/2024    NOVOLOG FLEXPEN 100 UNIT/ML Subcutaneous Solution Pen-injector Inject 8 Units into the skin 3 (three) times daily before meals. Following a sliding scale at home.   Past Month    Accu-Chek Soft Touch Lancets Does not  apply Misc        Glucose Blood In Vitro Strip        Insulin Pen Needle 31G X 6 MM Does not apply Misc       [2]   Current Facility-Administered Medications Ordered in Epic   Medication Dose Route Frequency Provider Last Rate Last Admin    [Transfer Hold] losartan (Cozaar) tab 25 mg  25 mg Oral Daily Cori Rico APRN   25 mg at 11/19/24 1421    dextrose 5% infusion   Intravenous Continuous Katelynn Dow DPJAYSHREE 50 mL/hr at 11/19/24 1717 New Bag at 11/19/24 1717    vancomycin (Vancocin) 1.5 g in sodium chloride 0.9% 250mL IVPB premix  1,500 mg Intravenous Q18H Raza Ornelas .7 mL/hr at 11/19/24 0506 1,500 mg at 11/19/24 0506    [Transfer Hold] insulin degludec (Tresiba) 100 units/mL flextouch 25 Units  25 Units Subcutaneous Nightly Karen Paniagua MD   25 Units at 11/18/24 2147    [Transfer Hold] torsemide (Demadex) tab 20 mg  20 mg Oral Daily Qamar, Setu, DO   20 mg at 11/19/24 0909    [Transfer Hold] rosuvastatin (Crestor) tab 40 mg  40 mg Oral Nightly Qamar, Setu, DO   40 mg at 11/18/24 2147    [Transfer Hold] insulin aspart (NovoLOG) 100 Units/mL FlexPen 1-7 Units  1-7 Units Subcutaneous TID CC and HS Karen Paniagua MD   2 Units at 11/18/24 1808    cefTRIAXone (Rocephin) 2 g in sodium chloride 0.9% 100 mL IVPB-ADDV  2 g Intravenous Q24H Rom Atkinson  mL/hr at 11/19/24 0910 2 g at 11/19/24 0910    [Transfer Hold] sennosides (Senokot) tab 8.6 mg  8.6 mg Oral BID Karen Paniagua MD   8.6 mg at 11/18/24 2147    [Transfer Hold] docusate sodium (Colace) cap 100 mg  100 mg Oral BID Karen Paniagua MD   100 mg at 11/18/24 2147    [Transfer Hold] polyethylene glycol (PEG 3350) (Miralax) 17 g oral packet 17 g  17 g Oral Daily PRN Karen Paniagua MD        [Transfer Hold] magnesium hydroxide (Milk of Magnesia) 400 MG/5ML oral suspension 30 mL  30 mL Oral Daily PRN Karen Paniagua MD        [Transfer Hold] bisacodyl (Dulcolax) 10 MG rectal suppository 10 mg  10 mg Rectal Daily  PRN Karen Paniagua MD        [Transfer Hold] latanoprost (Xalatan) 0.005 % ophthalmic solution 1 drop  1 drop Both Eyes Nightly Raza Ornelas MD   1 drop at 11/18/24 2147    [Transfer Hold] glucose (Dex4) 15 GM/59ML oral liquid 15 g  15 g Oral Q15 Min PRN Raza Ornelas MD   15 g at 11/17/24 0726    Or    [Transfer Hold] glucose (Glutose) 40% oral gel 15 g  15 g Oral Q15 Min PRN Raza Ornelas MD        Or    [Transfer Hold] glucose-vitamin C (Dex-4) chewable tab 4 tablet  4 tablet Oral Q15 Min PRN Raza Ornelas MD        Or    [Transfer Hold] dextrose 50% injection 50 mL  50 mL Intravenous Q15 Min PRN Raza Ornelas MD        Or    [Transfer Hold] glucose (Dex4) 15 GM/59ML oral liquid 30 g  30 g Oral Q15 Min PRN Raza Ornelas MD        Or    [Transfer Hold] glucose (Glutose) 40% oral gel 30 g  30 g Oral Q15 Min PRN Raza Ornelas MD        Or    [Transfer Hold] glucose-vitamin C (Dex-4) chewable tab 8 tablet  8 tablet Oral Q15 Min PRN Raza Ornelas MD        [Held by provider] heparin (Porcine) 5000 UNIT/ML injection 5,000 Units  5,000 Units Subcutaneous 2 times per day Raza Ornelas MD   5,000 Units at 11/18/24 2147    [Transfer Hold] acetaminophen (Tylenol Extra Strength) tab 500 mg  500 mg Oral Q4H PRN Raza Ornelas MD        [Transfer Hold] ondansetron (Zofran) 4 MG/2ML injection 4 mg  4 mg Intravenous Q6H PRN Raza Ornelas MD        [Transfer Hold] metoclopramide (Reglan) 5 mg/mL injection 10 mg  10 mg Intravenous Q8H PRN Raza Ornelas MD        [Transfer Hold] temazepam (Restoril) cap 15 mg  15 mg Oral Nightly PRN Raza Ornelas MD        [Transfer Hold] aspirin DR tab 81 mg  81 mg Oral Daily Veronica Garcia APN   81 mg at 11/19/24 0909     Current Outpatient Medications Ordered in Epic   Medication Sig Dispense Refill    aspirin 81 MG Oral Tab EC Take 1 tablet (81 mg total) by mouth daily. 30 tablet 5    atorvastatin 80 MG Oral Tab Take 1 tablet (80 mg total) by mouth  nightly. 30 tablet 5    metoprolol tartrate 25 MG Oral Tab Take 1 tablet (25 mg total) by mouth 2 (two) times daily. 60 tablet 5   [3] No Known Allergies

## 2024-11-21 VITALS
RESPIRATION RATE: 18 BRPM | WEIGHT: 213 LBS | TEMPERATURE: 98 F | BODY MASS INDEX: 28.23 KG/M2 | HEIGHT: 73 IN | OXYGEN SATURATION: 95 % | DIASTOLIC BLOOD PRESSURE: 47 MMHG | SYSTOLIC BLOOD PRESSURE: 115 MMHG | HEART RATE: 68 BPM

## 2024-11-21 LAB
ANION GAP SERPL CALC-SCNC: 5 MMOL/L (ref 0–18)
BASOPHILS # BLD AUTO: 0.07 X10(3) UL (ref 0–0.2)
BASOPHILS NFR BLD AUTO: 0.8 %
BUN BLD-MCNC: 25 MG/DL (ref 9–23)
BUN/CREAT SERPL: 25.3 (ref 10–20)
CALCIUM BLD-MCNC: 9.7 MG/DL (ref 8.7–10.4)
CHLORIDE SERPL-SCNC: 104 MMOL/L (ref 98–112)
CO2 SERPL-SCNC: 34 MMOL/L (ref 21–32)
CREAT BLD-MCNC: 0.99 MG/DL
DEPRECATED RDW RBC AUTO: 45.2 FL (ref 35.1–46.3)
EGFRCR SERPLBLD CKD-EPI 2021: 84 ML/MIN/1.73M2 (ref 60–?)
EOSINOPHIL # BLD AUTO: 0.21 X10(3) UL (ref 0–0.7)
EOSINOPHIL NFR BLD AUTO: 2.3 %
ERYTHROCYTE [DISTWIDTH] IN BLOOD BY AUTOMATED COUNT: 12.1 % (ref 11–15)
GLUCOSE BLD-MCNC: 58 MG/DL (ref 70–99)
GLUCOSE BLDC GLUCOMTR-MCNC: 196 MG/DL (ref 70–99)
GLUCOSE BLDC GLUCOMTR-MCNC: 71 MG/DL (ref 70–99)
HCT VFR BLD AUTO: 37.4 %
HGB BLD-MCNC: 11.9 G/DL
IMM GRANULOCYTES # BLD AUTO: 0.02 X10(3) UL (ref 0–1)
IMM GRANULOCYTES NFR BLD: 0.2 %
LYMPHOCYTES # BLD AUTO: 1.43 X10(3) UL (ref 1–4)
LYMPHOCYTES NFR BLD AUTO: 15.8 %
MCH RBC QN AUTO: 31.9 PG (ref 26–34)
MCHC RBC AUTO-ENTMCNC: 31.8 G/DL (ref 31–37)
MCV RBC AUTO: 100.3 FL
MONOCYTES # BLD AUTO: 0.76 X10(3) UL (ref 0.1–1)
MONOCYTES NFR BLD AUTO: 8.4 %
NEUTROPHILS # BLD AUTO: 6.55 X10 (3) UL (ref 1.5–7.7)
NEUTROPHILS # BLD AUTO: 6.55 X10(3) UL (ref 1.5–7.7)
NEUTROPHILS NFR BLD AUTO: 72.5 %
OSMOLALITY SERPL CALC.SUM OF ELEC: 298 MOSM/KG (ref 275–295)
PLATELET # BLD AUTO: 231 10(3)UL (ref 150–450)
POTASSIUM SERPL-SCNC: 3.7 MMOL/L (ref 3.5–5.1)
RBC # BLD AUTO: 3.73 X10(6)UL
SODIUM SERPL-SCNC: 143 MMOL/L (ref 136–145)
WBC # BLD AUTO: 9 X10(3) UL (ref 4–11)

## 2024-11-21 PROCEDURE — 99239 HOSP IP/OBS DSCHRG MGMT >30: CPT | Performed by: INTERNAL MEDICINE

## 2024-11-21 RX ORDER — TORSEMIDE 20 MG/1
20 TABLET ORAL DAILY
Qty: 30 TABLET | Refills: 3 | Status: ON HOLD | OUTPATIENT
Start: 2024-11-22

## 2024-11-21 RX ORDER — LOSARTAN POTASSIUM 25 MG/1
25 TABLET ORAL DAILY
Qty: 30 TABLET | Refills: 3 | Status: ON HOLD | OUTPATIENT
Start: 2024-11-22

## 2024-11-21 RX ORDER — ROSUVASTATIN CALCIUM 40 MG/1
40 TABLET, COATED ORAL NIGHTLY
Qty: 30 TABLET | Refills: 3 | Status: ON HOLD | OUTPATIENT
Start: 2024-11-21

## 2024-11-21 RX ORDER — DOXYCYCLINE 100 MG/1
100 CAPSULE ORAL 2 TIMES DAILY
Qty: 56 CAPSULE | Refills: 0 | Status: ON HOLD | OUTPATIENT
Start: 2024-11-21 | End: 2024-12-19

## 2024-11-21 RX ORDER — LEVOFLOXACIN 750 MG/1
750 TABLET, FILM COATED ORAL DAILY
Qty: 28 TABLET | Refills: 0 | Status: ON HOLD | OUTPATIENT
Start: 2024-11-21 | End: 2024-12-19

## 2024-11-21 RX ORDER — METOPROLOL SUCCINATE 50 MG/1
50 TABLET, EXTENDED RELEASE ORAL DAILY
Qty: 30 TABLET | Refills: 3 | Status: ON HOLD | OUTPATIENT
Start: 2024-11-21

## 2024-11-21 RX ADMIN — ASPIRIN 81 MG: 81 TABLET ORAL at 09:33:00

## 2024-11-21 RX ADMIN — DOCUSATE SODIUM 100 MG: 100 CAPSULE, LIQUID FILLED ORAL at 09:33:00

## 2024-11-21 RX ADMIN — SENNOSIDES 8.6 MG: 8.6 MG TABLET ORAL at 09:33:00

## 2024-11-21 RX ADMIN — HEPARIN SODIUM 5000 UNITS: 5000 INJECTION, SOLUTION INTRAVENOUS; SUBCUTANEOUS at 09:33:00

## 2024-11-21 RX ADMIN — TORSEMIDE 20 MG: 20 TABLET ORAL at 09:33:00

## 2024-11-21 RX ADMIN — LOSARTAN POTASSIUM 25 MG: 25 TABLET ORAL at 09:33:00

## 2024-11-21 NOTE — DISCHARGE SUMMARY
[facility]  part of [facility]    Discharge Summary    Davin Hassan Patient Status:  Inpatient    1955 MRN [id number]   Location [facility] 3W/SW Attending Sofia Boyce MD   Hosp Day # 7 PCP Susana Magallon MD     Date of Admission: 2024      Date of Discharge: 24    Admitting Diagnosis: Abnormal nuclear stress test [R94.39]  Abnormal echocardiogram [R93.1]    Hospital Discharge Diagnoses:  LLE Cellulitis with 5th toe OM  LLE foot abscess  Multivessel CAD    Lace+ Score: 29  59-90 High Risk  29-58 Medium Risk  0-28   Low Risk.    TCM Follow-Up Recommendation:  LACE < 29: Low Risk of readmission after discharge from the hospital; Still recommend for TCM follow-up.          Problem List:   Patient Active Problem List   Diagnosis    Alternating esotropia    Blepharitis of upper and lower eyelids of both eyes    Myopia of both eyes with astigmatism and presbyopia    Age-related nuclear cataract of both eyes    BDR (background diabetic retinopathy) (HCC)    Diabetic macular edema of both eyes (HCC)    Primary open angle glaucoma (POAG) of both eyes    CAD (coronary artery disease)    Pre-op testing    Osteomyelitis of fifth toe of left foot (HCC)    Acute osteomyelitis of right foot (HCC)         Physical Exam:   Vitals:    24 0931   BP: 115/47   Pulse: 68   Resp: 18   Temp: 98.2 °F (36.8 °C)         History of Present Illness: see HPI    Hospital Course:   67 y/o male with Pmhx of Insulin-dependent diabetes mellitus type 2, hyperlipidemia, peripheral diabetic neuropathy, glaucoma, and peripheral arterial disease.he was evaluated by Cardiology and had a stress test which was abnormal.  Presented for scheduled LHC on 24 which was revealing for severe triple-vessel disease with mild-mod LV dysfunction. Admitted for treatment of LE wound and cellulitis and early OM. Had bone bx . Evaluated by CV surgery. Plans for surgery but timing to be  determined pending how infection resolves.  He will dc home on PO abx per ID recs . Infection is not systemic.  F/u for final pathology of bone bx results, cx with serratia. CVS to decide on when to do surgery. On PO aspirin, statin, arb, bb.   He did have mild Acute on Chronic HFmrEF exacerbation improved with iv lasix now on Po torsemide.      Cardiac meds as below per cardio. DC on PO abx.   Resume other home meds. Updated meds below.   F/u with CVS and cardio as outpatient.     Discharge Condition: Good    Discharge Medications:      Discharge Medications        START taking these medications        Instructions Prescription details   aspirin 81 MG Tbec      Take 1 tablet (81 mg total) by mouth daily.   Quantity: 30 tablet  Refills: 5     levoFLOXacin 750 MG Tabs  Commonly known as: Levaquin      Take 1 tablet (750 mg total) by mouth daily for 28 days.   Stop taking on: December 19, 2024  Quantity: 28 tablet  Refills: 0     losartan 25 MG Tabs  Commonly known as: Cozaar  Start taking on: November 22, 2024      Take 1 tablet (25 mg total) by mouth daily.   Quantity: 30 tablet  Refills: 3     metoprolol succinate ER 50 MG Tb24  Commonly known as: Toprol XL      Take 1 tablet (50 mg total) by mouth daily.   Quantity: 30 tablet  Refills: 3     rosuvastatin 40 MG Tabs  Commonly known as: Crestor      Take 1 tablet (40 mg total) by mouth nightly.   Quantity: 30 tablet  Refills: 3     torsemide 20 MG Tabs  Commonly known as: Demadex  Start taking on: November 22, 2024      Take 1 tablet (20 mg total) by mouth daily.   Quantity: 30 tablet  Refills: 3            CONTINUE taking these medications        Instructions Prescription details   Accu-Chek Soft Touch Lancets Misc       Refills: 0     acetaminophen 500 MG Tabs  Commonly known as: Tylenol Extra Strength      Take 1 tablet (500 mg total) by mouth every 6 (six) hours as needed for Pain.   Refills: 0     doxycycline 100 MG Caps  Commonly known as: Vibramycin      Take  1 capsule (100 mg total) by mouth 2 (two) times daily for 28 days.   Stop taking on: December 19, 2024  Quantity: 56 capsule  Refills: 0     Glucose Blood Strp       Refills: 0     insulin degludec 100 units/mL Sopn  Commonly known as: Tresiba      Inject 28 Units into the skin nightly.   Refills: 0     Insulin Pen Needle 31G X 6 MM Misc       Refills: 0     latanoprost 0.005 % Soln  Commonly known as: Xalatan      INSTILL 1 DROP INTO BOTH   EYES EVERY NIGHT   Quantity: 7.5 mL  Refills: 3     NovoLOG FlexPen 100 UNIT/ML Sopn  Generic drug: insulin aspart      Inject 8 Units into the skin 3 (three) times daily before meals. Following a sliding scale at home.   Refills: 0            STOP taking these medications      AMOXICILLIN OR                  Where to Get Your Medications        These medications [id number] - [place], [place] - [address] AT [facility], [phone], [phone]  [address], [facility] [address]      Hours: 24-hours Phone: [phone]   aspirin 81 MG Tbec  doxycycline 100 MG Caps  levoFLOXacin 750 MG Tabs  losartan 25 MG Tabs  metoprolol succinate ER 50 MG Tb24  rosuvastatin 40 MG Tabs  torsemide 20 MG Tabs             Sofia Boyce MD  11/21/2024  3:33 PM    Greater than 30 minutes spent on preparation and coordination of this discharge

## 2024-11-21 NOTE — PLAN OF CARE
Problem: Diabetes/Glucose Control  Goal: Glucose maintained within prescribed range  Description: INTERVENTIONS:  - Monitor Blood Glucose as ordered  - Assess for signs and symptoms of hyperglycemia and hypoglycemia  - Administer ordered medications to maintain glucose within target range  - Assess barriers to adequate nutritional intake and initiate nutrition consult as needed  - Instruct patient on self management of diabetes  11/21/2024 1523 by Imelda Thompson RN  Outcome: Adequate for Discharge  11/21/2024 1220 by Imelda Thompson RN  Outcome: Progressing     Problem: Patient Centered Care  Goal: Patient preferences are identified and integrated in the patient's plan of care  Description: Interventions:  - What would you like us to know as we care for you? I'm from home with my wife  - Provide timely, complete, and accurate information to patient/family  - Incorporate patient and family knowledge, values, beliefs, and cultural backgrounds into the planning and delivery of care  - Encourage patient/family to participate in care and decision-making at the level they choose  - Honor patient and family perspectives and choices  11/21/2024 1523 by Imelda Thompson RN  Outcome: Adequate for Discharge  11/21/2024 1220 by Imelda Thompson RN  Outcome: Progressing     Problem: Patient/Family Goals  Goal: Patient/Family Long Term Goal  Description: Patient's Long Term Goal: discharge home    Interventions:  - Monitor vitals, labs, imaging  - Tele  - Cards, podiatry, ID on consult  - See additional Care Plan goals for specific interventions  11/21/2024 1523 by Imelda Thompson RN  Outcome: Adequate for Discharge  11/21/2024 1220 by Imelda Thompson RN  Outcome: Progressing  Goal: Patient/Family Short Term Goal  Description: Patient's Short Term Goal: manage pain    Interventions:   - Frequent pain assessments  - PRN pain meds  - non-pharmacological interventions  - See additional Care Plan goals for specific  interventions  11/21/2024 1523 by Imelda Thompson RN  Outcome: Adequate for Discharge  11/21/2024 1220 by Imelda Thompson RN  Outcome: Progressing     Problem: CARDIOVASCULAR - ADULT  Goal: Maintains optimal cardiac output and hemodynamic stability  Description: INTERVENTIONS:  - Monitor vital signs, rhythm, and trends  - Monitor for bleeding, hypotension and signs of decreased cardiac output  - Evaluate effectiveness of vasoactive medications to optimize hemodynamic stability  - Monitor arterial and/or venous puncture sites for bleeding and/or hematoma  - Assess quality of pulses, skin color and temperature  - Assess for signs of decreased coronary artery perfusion - ex. Angina  - Evaluate fluid balance, assess for edema, trend weights  11/21/2024 1523 by Imelda Thompson RN  Outcome: Adequate for Discharge  11/21/2024 1220 by Imelda Thompson RN  Outcome: Progressing  Goal: Absence of cardiac arrhythmias or at baseline  Description: INTERVENTIONS:  - Continuous cardiac monitoring, monitor vital signs, obtain 12 lead EKG if indicated  - Evaluate effectiveness of antiarrhythmic and heart rate control medications as ordered  - Initiate emergency measures for life threatening arrhythmias  - Monitor electrolytes and administer replacement therapy as ordered  11/21/2024 1523 by Imelda Thompson RN  Outcome: Adequate for Discharge  11/21/2024 1220 by Imelda Thompson RN  Outcome: Progressing     Problem: SKIN/TISSUE INTEGRITY - ADULT  Goal: Incision(s), wounds(s) or drain site(s) healing without S/S of infection  Description: INTERVENTIONS:  - Assess and document risk factors for pressure ulcer development  - Assess and document skin integrity  - Assess and document dressing/incision, wound bed, drain sites and surrounding tissue  - Implement wound care per orders  - Initiate isolation precautions as appropriate  - Initiate Pressure Ulcer prevention bundle as indicated  11/21/2024 1523 by Imelda Thompson RN  Outcome:  Adequate for Discharge  11/21/2024 1220 by Imelda Thompson RN  Outcome: Progressing     Problem: METABOLIC/FLUID AND ELECTROLYTES - ADULT  Goal: Glucose maintained within prescribed range  Description: INTERVENTIONS:  - Monitor Blood Glucose as ordered  - Assess for signs and symptoms of hyperglycemia and hypoglycemia  - Administer ordered medications to maintain glucose within target range  - Assess barriers to adequate nutritional intake and initiate nutrition consult as needed  - Instruct patient on self management of diabetes  11/21/2024 1523 by Imelda Thompson RN  Outcome: Adequate for Discharge  11/21/2024 1220 by Imelda Thompson RN  Outcome: Progressing  Goal: Electrolytes maintained within normal limits  Description: INTERVENTIONS:  - Monitor labs and rhythm and assess patient for signs and symptoms of electrolyte imbalances  - Administer electrolyte replacement as ordered  - Monitor response to electrolyte replacements, including rhythm and repeat lab results as appropriate  - Fluid restriction as ordered  - Instruct patient on fluid and nutrition restrictions as appropriate  11/21/2024 1523 by Imelda Thompson RN  Outcome: Adequate for Discharge  11/21/2024 1220 by Imelda Thompson RN  Outcome: Progressing  Goal: Hemodynamic stability and optimal renal function maintained  Description: INTERVENTIONS:  - Monitor labs and assess for signs and symptoms of volume excess or deficit  - Monitor intake, output and patient weight  - Monitor urine specific gravity, serum osmolarity and serum sodium as indicated or ordered  - Monitor response to interventions for patient's volume status, including labs, urine output, blood pressure (other measures as available)  - Encourage oral intake as appropriate  - Instruct patient on fluid and nutrition restrictions as appropriate  11/21/2024 1523 by Imelda Thompson RN  Outcome: Adequate for Discharge  11/21/2024 1220 by Imelda Thompson RN  Outcome: Progressing     Problem: RISK  FOR INFECTION - ADULT  Goal: Absence of fever/infection during anticipated neutropenic period  Description: INTERVENTIONS  - Monitor WBC  - Administer growth factors as ordered  - Implement neutropenic guidelines  11/21/2024 1523 by Imelda Thompson, RN  Outcome: Adequate for Discharge  11/21/2024 1220 by Imelda Thompson, RN  Outcome: Progressing

## 2024-11-21 NOTE — CM/SW NOTE
01:40PM  DANIELLE sent message to PA and YOVANA SIMON earlier this AM - requesting update on plan for pt's Abx. No update at this time.    ID note in for today does not confirm DC plan for Abx.    03:30PM  Pt w/ DC order in place. SW consulted w/ ADITI Pal to inquire about Abx plan. RN added SW to tx team chat in Epic.    YOVANA SIMON stating he is adding Rx to pt's med rec. Danielle inquired to confirm Abx plan. MD confirmed PO Abx at DC.    No needs at this time.    Pt is cleared from SW/CM stand point.      PLAN: Home, no needs                   Vaishali, MSW, LSW [id number]

## 2024-11-21 NOTE — PROGRESS NOTES
Consult Note    Patient Name: Davin Hassan    YOB: 1955    Date of Admission: 11/14/2024    History of present Illness    Davin Hassan is a 68 year old male who was admitted for Abnormal nuclear stress test [R94.39]  Abnormal echocardiogram [R93.1]. Patient was Dr. Ornelas for evaluation of lower extremity.  Patient is a 68-year-old man with a past medical history of insulin-dependent diabetes type 2, hyperlipidemia, peripheral diabetic neuropathy, glaucoma, peripheral arterial disease who was recently on vacation in [facility] when he noted some orthopnea, dyspnea on exertion and developed cellulitis of his left lower extremity left leg.  Patient was admitted in the hospital in [facility] and started on antibiotics and dry diuretics.  Upon return to [place] he was evaluated by cardiology and had a stress test which was abnormal.  Patient subsequently underwent a cardiac angiogram by his cardiologist which showed reportedly multilevel coronary artery disease.  Patient was subsequently admitted on 11/14/2024 for further evaluation.  Upon presentation patient still had persistent cellulitis of the left lower extremity and an ulceration on the dorsal aspect the left fifth digit.  An MRI was ordered which noted concern for fluid collection on the dorsal aspect of the fifth MPJ as well as possible osteomyelitis of the fifth metatarsal head and base of the fifth proximal phalanx.  On 11/12/2024 patient had a slight leukocytosis of 11.2 but by the time of presentation to the hospital on 11/14/2024 this had resolved.  Patient was afebrile.  Infectious disease was consulted who recommended vancomycin and Zosyn.  In regards to his multivessel CAD cardiology and cardiothoracic surgery consulted and plan for CABG once infection is cleared.  Arterial studies performed on the lower extremity on 9/12/2024 showed left lower extremity diminished pressures and dampened waveforms at the  popliteal and posterior tibial levels consistent with some atherosclerotic involvement of the segments.  Cardiology recently evaluated patient who reviewed ABIs and recommended holding off on any angio and may consider TBI versus peripheral angiogram if ischemia is concerned.    Patient evaluated at bedside this evening resting comfortably. Patient is status post left foot incision and drainage with multiple bone biopsies, POD#1, DOS: 11/19/24. Patient evaluated at bedside this evening resting comfortably.     Prescriptions Prior to Admission[1]  Allergies[2]  Past Medical History:    Alternating esotropia    Alternating esotropia    BDR (background diabetic retinopathy) ([facility])    under the care of Dr. Brito- see progress note    Colon adenomas    x4    Diabetic macular edema of left eye ([facility])    S/P Eylea injection OS x 5 with Dr. Brito    Glaucoma suspect of both eyes    Primary open angle glaucoma (POAG) of left eye, mild stage    2/4/19 start Latanoprost qhs OS due to thinning of the optic nerve on OCT    Type II or unspecified type diabetes mellitus without mention of complication, not stated as uncontrolled     Past Surgical History:   Procedure Laterality Date    Colonoscopy  03/2008    Colonoscopy  04/05/2023    Strabismus surgery Bilateral 1959     Social History     Socioeconomic History    Marital status:      Spouse name: Not on file    Number of children: Not on file    Years of education: Not on file    Highest education level: Not on file   Occupational History    Not on file   Tobacco Use    Smoking status: Never    Smokeless tobacco: Never   Vaping Use    Vaping status: Never Used   Substance and Sexual Activity    Alcohol use: Yes     Alcohol/week: 1.0 standard drink of alcohol     Types: 1 Glasses of wine per week     Comment: rarely, beer & liquor, 1 drink     Drug use: No    Sexual activity: Not on file   Other Topics Concern     Service Not Asked    Blood Transfusions Not  Asked    Caffeine Concern Yes     Comment: coffee, soda, 3 cups daily    Occupational Exposure Not Asked    Hobby Hazards Not Asked    Sleep Concern Not Asked    Stress Concern Not Asked    Weight Concern Not Asked    Special Diet Not Asked    Back Care Not Asked    Exercise Not Asked    Bike Helmet Not Asked    Seat Belt Not Asked    Self-Exams Not Asked   Social History Narrative    Not on file     Social Drivers of Health     Financial Resource Strain: Not on file   Food Insecurity: No Food Insecurity (11/14/2024)    Food Insecurity     Food Insecurity: Never true   Transportation Needs: No Transportation Needs (11/14/2024)    Transportation Needs     Lack of Transportation: No     Car Seat: Not on file   Physical Activity: Not on file   Stress: Not on file   Social Connections: Not on file   Housing Stability: Low Risk  (11/14/2024)    Housing Stability     Housing Instability: No     Housing Instability Emergency: Not on file     Crib or Bassinette: Not on file     Family History   Problem Relation Age of Onset    Diabetes Father     Glaucoma Father     Macular degeneration Father     Breast Cancer Mother     Heart Disorder Brother        Review of Systems  Constitutional: negative for chills, fevers and sweats  Gastrointestinal: negative for abdominal pain, diarrhea, nausea and vomiting  Genitourinary:negative for dysuria and hematuria  Musculoskeletal:negative for arthralgias and muscle weakness  Neurological: negative for paresthesia and weakness  All others reviewed and negative.      Physical Exam  Temp:  [97.8 °F (36.6 °C)-98.6 °F (37 °C)] 98.6 °F (37 °C)  Pulse:  [60-68] 68  Resp:  [17-20] 18  BP: ()/(44-60) 110/57  SpO2:  [92 %-98 %] 98 %    Constitution: Well-developed and well-nourished. Gait appears normal. No apparent distress. Alert and oriented to person, place, and time.  Integument: There are no varicosities.  There are no color changes.  No macerations, No Hyperkeratotic  lesions.  Vascular examination: Dorsalis pedis and posterior tibial pulses are weakly palpable on the left strong on the right.  Neurological Sensorium: Grossly intact to sharp/dull. Vibratory: Intact.  Musculoskeletal:   5/5 pedal muscle strength b/l     Left lower extremity:    Incision adhered with sutures intact. Erythema extending to the level of the MPJ.  Imaging  CATH LEFT AND RIGHT CATH W/INTERVENTION    Result Date: 11/14/2024  This exam has been completed. Please refer to Notes for the results to this procedure.      Labs  Lab Results   Component Value Date    WBC 10.8 11/20/2024    HGB 12.5 (L) 11/20/2024    HCT 38.2 (L) 11/20/2024    MCV 99.7 11/20/2024    .0 11/20/2024     No results found for: \"PTT\"  No results found for: \"PROTIME\"  Lab Results   Component Value Date     11/20/2024    K 4.2 11/20/2024    CO2 33.0 (H) 11/20/2024     11/20/2024    BUN 24 (H) 11/20/2024       Assessment    68 year old male with a past medical history of insulin-dependent diabetes type 2, hyperlipidemia, peripheral diabetic neuropathy, glaucoma, peripheral arterial disease presenting with concern for left fifth digit ulceration with possible underlying osteomyelitis.     Plan     Left fifth digit diabetic foot ulceration/concern for underlying osteomyelitis: Had a lengthy discussion with patient in regards to his clinical presentation and MRI findings.  Discussed an MRI is not conclusive for osteomyelitis but rather insensitive for concern with bone marrow edema.  Discussed that my primary concern at this time is his ability to heal any sort of treatment whether it be biopsy versus amputation versus IV antibiotics.  Discussed that for most good peripheral arterial flow is needed.  status post left foot incision and drainage with multiple bone biopsies, POD#1, DOS: 11/19/24. Patient adamant about not moving forward with amputation. Spoke with infectious disease as well about plan for IV antibiotics vs PO  vs amputation. Will wait for biopsy results. Continue daily dressing changes of betadine, non-adherent. 4x4 gauze, and anny.   CAD/PAD: Currently being followed by cardiology.  Per review of last note, considering holding off on peripheral angiogram pending concern for ischemic etiology.  May consider TBI versus peripheral angiogram.  Antibiotics: Currently being managed by infectious disease on Vanco/Ceftriaxone.  Bone biopsy cx on 11/19 Serratia.   Discharge planning: Pending biopsy results.        Katelynn Vasquez DPM, D.FREEDOM DASILVA  Diplomat, American Board of Foot and Ankle Surgery  Certified in Foot and Rearfoot/Ankle Reconstruction  Fellow of the American College of Foot and Ankle Surgeons  Fellowship Trained Foot and Ankle Surgeon   [facility]          [1]   Medications Prior to Admission   Medication Sig Dispense Refill Last Dose/Taking    doxycycline 100 MG Oral Cap Take 1 capsule (100 mg total) by mouth 2 (two) times daily.   11/13/2024    AMOXICILLIN OR Take 1 g by mouth in the morning and 1 g before bedtime. Day 4 of Day 7   2 pills morning and evening.   11/13/2024    acetaminophen 500 MG Oral Tab Take 1 tablet (500 mg total) by mouth every 6 (six) hours as needed for Pain.   Past Week    insulin degludec 100 units/mL Subcutaneous Solution Pen-injector Inject 28 Units into the skin nightly.   11/13/2024    latanoprost 0.005 % Ophthalmic Solution INSTILL 1 DROP INTO BOTH   EYES EVERY NIGHT 7.5 mL 3 11/13/2024    NOVOLOG FLEXPEN 100 UNIT/ML Subcutaneous Solution Pen-injector Inject 8 Units into the skin 3 (three) times daily before meals. Following a sliding scale at home.   Past Month    Accu-Chek Soft Touch Lancets Does not apply Misc        Glucose Blood In Vitro Strip        Insulin Pen Needle 31G X 6 MM Does not apply Misc       [2] No Known Allergies

## 2024-11-21 NOTE — WOUND PROGRESS NOTE
Per Dr. Dow DPM note : \"Continue daily dressing changes of betadine, non-adherent. 4x4 gauze, and anny. \"  Wound Rns signing off.

## 2024-11-21 NOTE — PROGRESS NOTES
Progress Note  Davin Hassan Patient Status:  Inpatient    1955 MRN [id number]   Location [facility] 3W/SW Attending Sofia Boyce MD   Hosp Day # 7 PCP Susana Magallon MD     SUBJECTIVE:    Denies chest pain or dyspnea. No numbness or tingling to RUE. Would like to discharge home and follow up with CV surgery outpatient. Overwhelmed about no definitive plan.     VITALS:  /47 (BP Location: Left arm)   Pulse 68   Temp 98.2 °F (36.8 °C) (Oral)   Resp 18   Ht 6' 1\" (1.854 m)   Wt 213 lb (96.6 kg)   SpO2 95%   BMI 28.10 kg/m²     INTAKE/OUTPUT:    Intake/Output Summary (Last 24 hours) at 2024 1202  Last data filed at 2024 0931  Gross per 24 hour   Intake 350 ml   Output 1000 ml   Net -650 ml     Last 3 Weights   24 0552 213 lb (96.6 kg)   24 0458 205 lb 9.6 oz (93.3 kg)   24 0503 208 lb 1.6 oz (94.4 kg)   24 0317 209 lb 8 oz (95 kg)   24 0529 213 lb 9.6 oz (96.9 kg)   11/15/24 0502 221 lb 3.2 oz (100.3 kg)   24 1334 229 lb (103.9 kg)   24 1128 229 lb (103.9 kg)   24 1740 230 lb (104.3 kg)   10/24/24 1609 210 lb (95.3 kg)   23 0810 224 lb (101.6 kg)   22 1538 230 lb 6.4 oz (104.5 kg)     LABS:  Recent Labs   Lab 24  0716 24  0641 24  0624   * 143* 58*   BUN 24* 24* 25*   CREATSERUM 0.93 1.02 0.99   EGFRCR 91 81 84   CA 9.8 9.8 9.7    138 143   K 3.9 4.2 3.7    104 104   CO2 33.0* 33.0* 34.0*     Recent Labs   Lab 24  0716 24  0641 24  0714   RBC 3.85 3.83 3.73*   HGB 12.9* 12.5* 11.9*   HCT 37.9* 38.2* 37.4*   MCV 98.4 99.7 100.3*   MCH 33.5 32.6 31.9   MCHC 34.0 32.7 31.8   RDW 12.0 12.1 12.1   NEPRELIM 5.28 8.32* 6.55   WBC 7.6 10.8 9.0   .0 262.0 231.0     No results for input(s): \"TROP\", \"CK\" in the last 168 hours.    DIAGNOSTICS:    TELEMETRY: SB/SR     ECHO 10/10/2024:  1. The left ventricle is normal in size. The left ventricular ejection  fraction is 45%. Regional wall motion analysis shows hypokinesis of mid anterolateral segment and apical lateral segment. Wall motion score index is 2. The left ventricle diastolic function is impaired (Grade II) with an elevated left atrial pressure.  Wall thickness is within normal limits.  2. The right ventricle is normal in size. Right ventricular systolic function is normal.  3.  The left atrium is mildly enlarged based on the left atrium volume index of 40.2ml/m².  4. Mitral regurgitation is noted. Trace mitral regurgitation.  5. The study quality is below average.       [facility] 11/14/2024:   Coronary anatomy   1) Left Ventriculography at 30 degrees ISAACS: LVEF: 45% with mid anterior and apical hypokinesis  2) Hemodynamics: LVEDP: 14 mmHg, no gradient across aortic valve  3) Coronaries:  Left main is long and free of obstructive disease  LAD is diffusely diseased with subtotal occlusion in the midsegment at the origin of a large diagonal branch, supplies 2 diagonals.  First diagonal is subtotally occluded in its medium to large vessel  Ramus: Medium to large vessel with 80 to 90% stenosis in the proximal segment  Cx subtotally occluded and small to medium vessel, supplies 1 OM branches which are patent  RCA is dominant and occluded in the midsegment with right to right faint collaterals, supplies PDA and PL    ROS: Negative unless noted above     PHYSICAL EXAM:  General: Alert and oriented x 3. No apparent distress.  HEENT: Normocephalic, sclera are nonicteric. Hearing appropriate bilaterally.  Neck: No JVD or Carotid bruits. Trachea midline.   Cardiac: Regular rate and rhythm. S1, S2 auscultated. No murmurs, rubs, or gallops appreciated.   Lungs: Clear without wheezes, rales, rhonchi or dullness. Chest expansion symmetrical. Regular effort.  Abdomen: Soft, non-tender, +BS. No hepatosplenomegaly or appreciable masses.   Extremities: Without clubbing, cyanosis or edema. Peripheral pulses are 2+.  Neurologic: Motor  and sensory nerves grossly intact.   Psych: Appropriate affect   Skin: Warm and dry. Right wrist soft without bruit/ bruising/ hematoma     MEDICATIONS:   vancomycin  1,500 mg Intravenous Q24H    losartan  25 mg Oral Daily    insulin degludec  25 Units Subcutaneous Nightly    torsemide  20 mg Oral Daily    rosuvastatin  40 mg Oral Nightly    insulin aspart  1-7 Units Subcutaneous TID CC and HS    cefTRIAXone  2 g Intravenous Q24H    sennosides  8.6 mg Oral BID    docusate sodium  100 mg Oral BID    latanoprost  1 drop Both Eyes Nightly    heparin  5,000 Units Subcutaneous 2 times per day    aspirin  81 mg Oral Daily      dextrose Stopped (11/19/24 1699)   ASSESSMENT:    Presented for scheduled LHC on 11/14/24 which was revealing for severe triple-vessel disease with mild-mod LV dysfunction. Admitted for treatment of LE wound and cellulitis. Evaluated by CV surgery. Plans for surgery but timing to be determined, CVS will see how infection resolves.     CAD  - Multivessel disease as above & CVS consult as above   - LVEF on ECHO preserved, LVEF on LHC 45%  - On BB, high intensity statin, Aspirin. Started ARB yesterday, tolerating     Acute on Chronic HFmrEF, LVEF 45%  - 11/14/24 RHC: PCW 12, CI 3.6. s/p IV lasix now compensated on PO Torsemide, bicarb slightly elevated   - proBNP 1087    LE Cellulitus/ LLE w/ fifth toe OM  - ID and podiatry following, bone biopsy 11/19: Serratia, ABX, podiatry was recommending amputation but patient declined   - ABX     HLD-  on Crestor 40 mg, new to statin therapy this stay   Type II DM- A1c 8.9%   Chronic Anemia- Stable     PLAN:  - Primary cardiologist to see patient today and determine if patient is stable enough to outpatient CVS surgery follow up verse Complex PCI is need prior to discharge     ADDENDUM 1319: Spoke with ID. Patient is cleared from their standpoint for any cardiac procedure as infection is not systemic. Type and timing of procedure TBD.     Plan of care  discussed with patient and RN.     Cori Rico, APRN  11/21/2024  11:18 AM  [phone] ([place])  [phone] ([place])          =======================================================  Patient seen and examined independently.  Note reviewed and labs reviewed.  Agree with above assessment and plan except as noted below.      Assessment:  Severe triple-vessel disease  Osteomyelitis  Severe PAD  LV dysfunction  Hypertension  Hyperlipidemia    Plan:  Discussed with ID and CT surgery.  CT surgery would like to wait till 4 weeks of antibiotics which is reasonable.  Okay to discharge home from cardiac standpoint for antibiotic duration and modality to be decided by ID.  Follow-up with me in 7 to 10 days and outpatient  If the wound on the left leg is not healing may require intervention on the left TP trunk prior to CABG    Jorge Barone MD        3        \

## 2024-11-21 NOTE — PROGRESS NOTES
INFECTIOUS DISEASE PROGRESS NOTE  [facility]  part of [facility] ID PROGRESS NOTE    Davin Hassan Patient Status:  Inpatient    1955 MRN [id number]   Location [facility] 3W/SW Attending Sofia Boyce MD   Hosp Day # 7 PCP Susana Magallon MD     Subjective:  ROS reviewed. Hoping to DC from hospital today.    ASSESSMENT:    Antibiotics: Vancomycin, ceftriaxone     # Acute LLE cellulitis with fifth toe ulceration, MRI c/w ?abscess with possible early OM               -On PO doxycycline and augmentin PTA   -s/p bone biopsy , cx Serratia  # Multivessel CAD               -CV surgery following  # PAD  # Diabetes mellitus     PLAN:  -  Continue on vancomycin and ceftriaxone. FU cx.  -  Follow fever curve, wbc.  -  Reviewed labs, micro, imaging reports, available old records.  -  Case d/w patient, wife on phone, RN.     History of Present Illness:  Davin Hassan is a 68 year old male with a history of diabetes mellitus, PAD, who was recently on a trip to [place] and was hospitalized in [place] for LLE cellulitis along with CHF given dyspnea on exertion, was given augmentin and diuretics and flew home. Developed a callus on his fifth toe which he states was draining. Saw podiatry this past Wednesday and started on doxycycline. Had planned C yesterday with findings of severe triple vessel disease and seen by CV surgery with concerns regarding L foot ulcer. Vancomycin started and plans for L foot MRI. ID consulted.    Physical Exam:  /47 (BP Location: Left arm)   Pulse 68   Temp 98.2 °F (36.8 °C) (Oral)   Resp 18   Ht 6' 1\" (1.854 m)   Wt 213 lb (96.6 kg)   SpO2 95%   BMI 28.10 kg/m²     Gen:   Awake, in chair  HEENT:  EOMI, neck supple  CV/lungs:  Regular rate and rhythm, CTAB  Abdom:  Soft, no TTP  Skin/extrem:  L foot with dressing intact, picture from  reviewed  Lines:  PIV+    Laboratory Data: Reviewed    Microbiology:  Reviewed    Radiology: Reviewed      [facility]  [phone]  11/21/2024

## 2024-11-22 ENCOUNTER — PATIENT OUTREACH (OUTPATIENT)
Dept: CASE MANAGEMENT | Age: 69
End: 2024-11-22

## 2024-11-22 DIAGNOSIS — Z02.9 ENCOUNTERS FOR UNSPECIFIED ADMINISTRATIVE PURPOSE: Primary | ICD-10-CM

## 2024-11-22 PROCEDURE — 1111F DSCHRG MED/CURRENT MED MERGE: CPT

## 2024-11-22 RX ORDER — PIOGLITAZONE 15 MG/1
15 TABLET ORAL DAILY
COMMUNITY
Start: 2024-06-10

## 2024-11-22 RX ORDER — ACYCLOVIR 400 MG/1
TABLET ORAL
COMMUNITY
Start: 2024-09-21 | End: 2024-11-25

## 2024-11-22 RX ORDER — ERGOCALCIFEROL 1.25 MG/1
50000 CAPSULE, LIQUID FILLED ORAL WEEKLY
COMMUNITY
Start: 2024-07-11

## 2024-11-22 NOTE — PROGRESS NOTES
Left message on mailbox for patient to call nurse care manager back for transitions of care call.  Nurse care  information included in message.      Transitions of Care Navigation  Discharge Date: 24  Contact Date: 2024    Transitions of Care Assessment:  MAVIS Initial Assessment    General:  Assessment completed with: Patient  Patient Subjective: Spoke with patient he is doing well since being discharged.  He picked up h is medications and started taking them. Medications reviewed.  Patient reports he does not have any pain in his toe or foot.  5th digit left toe incision is dry and does not have any redness, swelling or drainage. His wrist incision from the angiogram is healed.  Patient is walking with a cane just for steadiness.  Patient does have follow up appointments scheduled.  Primary care physician , Dr. Patel (cardio) , Dr. Atkinson (ID) .  He will be scheduling an appointment with his podiatrist for next week and also schedule with Dr. Flores (thoracic surgeon) and endocrinologist Dr. Seals.  Patient does not check his blood sugar as he typically wears a continuous glucose monitor.  This was taken off as he was planning to have a procedure.  He will put this back on in the next couple of days.  Patient denies the following symptoms:  fever, headache, dizziness, chest pain, shortness of breath, abdominal pain, nausea or vomiting. Patient did not have any additional questions or concerns.  Chief Complaint: LLE Cellulitis with 5th toe OM  LLE foot abscess  Multivessel CAD  Verify patient name and  with patient/ caregiver: Yes    Hospital Stay/Discharge:  Tell me what you understand of why you were in the hospital or emergency department: angioplasty before he went to [facility] was planned, while on flight to [facility] left foot was out in [place] and a few people steps on it.  While in [place] walking worsened as well as breathing.  left leg and foot infections causing fluid to  build up around his heart  Prior to leaving the hospital were your Discharge Instructions reviewed with you?: Yes  Did you receive a copy of your written Discharge Instructions?: Yes  What questions do you have about your Discharge Instructions?: Patient did not have any additional questions  Do you feel better or worse since you left the hospital or emergency department?: Better    Follow - Up Appointment:  Do you have a follow-up appointment?: Yes  Date: 11/25/24  Physician: pcp  Are there any barriers to getting to your follow-up appointment?: Yes    Home Health/DME:  Prior to leaving the hospital was Home Health (HH) arranged for you?: N/A  Are HH needs identified by staff during the assessment?: No     Prior to leaving the hospital or emergency department was Durable Medical Equipment (DME), medical supplies, or infusions arranged for you?: N/A  Are DME/medical supply/infusions needs identified by staff during this assessment?: No     Medications/Diet:  Did any of your medications change, during or after your hospital stay or ED visit?: Yes  Do you have your new or updated medications?: Yes  Do you understand what your medications are for and possible side effects?: Yes  Are there any reasons that keep you from taking your medication as prescribed?: No  Any concerns about medication refills?: No    Were you given a different diet per your Discharge Instructions?: Yes  Diet Type: low sodium, heart healthy  Reason: CAD  Are there any barriers to following that diet?: No     Questions/Concerns:  Do you have any questions or concerns that have not been discussed?: No       Nursing Interventions:  Assessed pain and for other signs/symptoms.  Reviewed medications. Instructed when to return to emergency room. Confirmed appointment with primary care physician and/or specialist.   Discussed wound care, toe with warm, soapy water, rinse with fresh water and pat dry, then apply bandage.  Reviewed incision care and  signs/symptoms of infection.       Medications:  Reviewed medications with patient.  Also, discussed new medication and potential side effects.  Patient did not have any additional questions.   Current Outpatient Medications   Medication Sig Dispense Refill    Continuous Glucose Sensor (DEXCOM G7 SENSOR) Does not apply Misc Every 10 days.      ergocalciferol 1.25 MG (90446 UT) Oral Cap Take 1 capsule (50,000 Units total) by mouth once a week.      pioglitazone 15 MG Oral Tab Take 1 tablet (15 mg total) by mouth daily.      doxycycline 100 MG Oral Cap Take 1 capsule (100 mg total) by mouth 2 (two) times daily for 28 days. 56 capsule 0    levoFLOXacin 750 MG Oral Tab Take 1 tablet (750 mg total) by mouth daily for 28 days. 28 tablet 0    losartan 25 MG Oral Tab Take 1 tablet (25 mg total) by mouth daily. 30 tablet 3    rosuvastatin 40 MG Oral Tab Take 1 tablet (40 mg total) by mouth nightly. 30 tablet 3    torsemide 20 MG Oral Tab Take 1 tablet (20 mg total) by mouth daily. 30 tablet 3    metoprolol succinate ER 50 MG Oral Tablet 24 Hr Take 1 tablet (50 mg total) by mouth daily. 30 tablet 3    aspirin 81 MG Oral Tab EC Take 1 tablet (81 mg total) by mouth daily. 30 tablet 5    insulin degludec 100 units/mL Subcutaneous Solution Pen-injector Inject 28 Units into the skin nightly.      latanoprost 0.005 % Ophthalmic Solution INSTILL 1 DROP INTO BOTH   EYES EVERY NIGHT 7.5 mL 3    NOVOLOG FLEXPEN 100 UNIT/ML Subcutaneous Solution Pen-injector Inject 8 Units into the skin 3 (three) times daily before meals. Following a sliding scale at home.      Insulin Pen Needle 31G X 6 MM Does not apply Misc       acetaminophen 500 MG Oral Tab Take 1 tablet (500 mg total) by mouth every 6 (six) hours as needed for Pain. (Patient not taking: Reported on 11/22/2024)      Accu-Chek Soft Touch Lancets Does not apply Misc  (Patient not taking: Reported on 11/22/2024)      Glucose Blood In Vitro Strip  (Patient not taking: Reported on  11/22/2024)             Follow-up Appointments:  Your appointments       Date & Time Appointment Department ([place])    Nov 25, 2024 11:00 AM [facility] Follow Up with Susana Magallon MD [facility] ([facility] at [address] [place] [address])              [facility] at [address] [place] [id number]  [phone]            [facility]  Was TCC Ordered: No    Primary Care Provider (If no TCC appointment)  Does patient already have a PCP appointment scheduled? Yes  Nurse Care Manager Confirmed PCP office HFU appointment with patient       Specialist  Does the patient have any other follow-up appointment(s) need to be scheduled? Yes   -If yes: Nurse Care Manager reviewed upcoming specialist appointments with patient: Yes   -Does the patient need assistance scheduling appointment(s): No    [x]  Patient verbally agrees to additional follow-up calls from Nurse Care Manager    Book By Date: 11/28/24

## 2024-11-25 ENCOUNTER — OFFICE VISIT (OUTPATIENT)
Dept: INTERNAL MEDICINE CLINIC | Facility: CLINIC | Age: 69
End: 2024-11-25
Payer: MEDICARE

## 2024-11-25 VITALS
HEART RATE: 87 BPM | SYSTOLIC BLOOD PRESSURE: 110 MMHG | OXYGEN SATURATION: 98 % | WEIGHT: 198 LBS | BODY MASS INDEX: 26.24 KG/M2 | DIASTOLIC BLOOD PRESSURE: 70 MMHG | HEIGHT: 73 IN

## 2024-11-25 DIAGNOSIS — M86.272 SUBACUTE OSTEOMYELITIS OF LEFT FOOT (HCC): Primary | ICD-10-CM

## 2024-11-25 DIAGNOSIS — E10.8 DIABETES MELLITUS TYPE 1 WITH COMPLICATIONS (HCC): ICD-10-CM

## 2024-11-25 DIAGNOSIS — I25.10 CORONARY ARTERY DISEASE INVOLVING NATIVE CORONARY ARTERY OF NATIVE HEART WITHOUT ANGINA PECTORIS: ICD-10-CM

## 2024-11-25 PROCEDURE — 99214 OFFICE O/P EST MOD 30 MIN: CPT | Performed by: INTERNAL MEDICINE

## 2024-11-25 PROCEDURE — 3008F BODY MASS INDEX DOCD: CPT | Performed by: INTERNAL MEDICINE

## 2024-11-25 PROCEDURE — 1159F MED LIST DOCD IN RCRD: CPT | Performed by: INTERNAL MEDICINE

## 2024-11-25 PROCEDURE — 3061F NEG MICROALBUMINURIA REV: CPT | Performed by: INTERNAL MEDICINE

## 2024-11-25 PROCEDURE — 3052F HG A1C>EQUAL 8.0%<EQUAL 9.0%: CPT | Performed by: INTERNAL MEDICINE

## 2024-11-25 PROCEDURE — 3074F SYST BP LT 130 MM HG: CPT | Performed by: INTERNAL MEDICINE

## 2024-11-25 PROCEDURE — 3078F DIAST BP <80 MM HG: CPT | Performed by: INTERNAL MEDICINE

## 2024-11-25 PROCEDURE — 1111F DSCHRG MED/CURRENT MED MERGE: CPT | Performed by: INTERNAL MEDICINE

## 2024-11-25 RX ORDER — ACYCLOVIR 400 MG/1
1 TABLET ORAL
Qty: 9 EACH | Refills: 3 | Status: SHIPPED | OUTPATIENT
Start: 2024-11-25

## 2024-11-25 NOTE — PROGRESS NOTES
Subjective:   Davin Hassan is a 68 year old male who presents for Hospital F/U     Patient here for fu from hospitalization for acute osteomyelitis of left foot, congestive heart failure and coronary artery disease.  Has multivessel CAD and will need CABG surgery in the near future.  Has lost 30 lbs of weight during hospitalization.  History/Other:    Chief Complaint Reviewed and Verified  No Further Nursing Notes to   Review  Medications Reviewed  Problem List Reviewed         Tobacco:  He has never smoked tobacco.    Current Outpatient Medications   Medication Sig Dispense Refill    Continuous Glucose Sensor (DEXCOM G7 SENSOR) Does not apply Misc Every 10 days.      ergocalciferol 1.25 MG (56624 UT) Oral Cap Take 1 capsule (50,000 Units total) by mouth once a week.      pioglitazone 15 MG Oral Tab Take 1 tablet (15 mg total) by mouth daily.      doxycycline 100 MG Oral Cap Take 1 capsule (100 mg total) by mouth 2 (two) times daily for 28 days. 56 capsule 0    levoFLOXacin 750 MG Oral Tab Take 1 tablet (750 mg total) by mouth daily for 28 days. 28 tablet 0    losartan 25 MG Oral Tab Take 1 tablet (25 mg total) by mouth daily. 30 tablet 3    rosuvastatin 40 MG Oral Tab Take 1 tablet (40 mg total) by mouth nightly. 30 tablet 3    torsemide 20 MG Oral Tab Take 1 tablet (20 mg total) by mouth daily. 30 tablet 3    metoprolol succinate ER 50 MG Oral Tablet 24 Hr Take 1 tablet (50 mg total) by mouth daily. 30 tablet 3    aspirin 81 MG Oral Tab EC Take 1 tablet (81 mg total) by mouth daily. 30 tablet 5    acetaminophen 500 MG Oral Tab Take 1 tablet (500 mg total) by mouth every 6 (six) hours as needed for Pain. (Patient not taking: Reported on 11/22/2024)      insulin degludec 100 units/mL Subcutaneous Solution Pen-injector Inject 28 Units into the skin nightly.      latanoprost 0.005 % Ophthalmic Solution INSTILL 1 DROP INTO BOTH   EYES EVERY NIGHT 7.5 mL 3    NOVOLOG FLEXPEN 100 UNIT/ML Subcutaneous  Solution Pen-injector Inject 8 Units into the skin 3 (three) times daily before meals. Following a sliding scale at home.      Accu-Chek Soft Touch Lancets Does not apply Misc  (Patient not taking: Reported on 11/22/2024)      Glucose Blood In Vitro Strip  (Patient not taking: Reported on 11/22/2024)      Insulin Pen Needle 31G X 6 MM Does not apply Misc            Review of Systems:  Review of Systems   Constitutional:  Positive for fatigue.   Respiratory:  Negative for shortness of breath.    Cardiovascular:  Negative for chest pain, palpitations and leg swelling.   Gastrointestinal: Negative.    Neurological:  Positive for numbness.         Objective:   /70   Pulse 87   Ht 6' 1\" (1.854 m)   Wt 198 lb (89.8 kg)   SpO2 98%   BMI 26.12 kg/m²  Estimated body mass index is 26.12 kg/m² as calculated from the following:    Height as of this encounter: 6' 1\" (1.854 m).    Weight as of this encounter: 198 lb (89.8 kg).  Physical Exam  Constitutional:       Appearance: He is normal weight.   HENT:      Head: Normocephalic and atraumatic.   Eyes:      General: No scleral icterus.     Pupils: Pupils are equal, round, and reactive to light.   Cardiovascular:      Rate and Rhythm: Normal rate and regular rhythm.   Pulmonary:      Effort: Pulmonary effort is normal.      Breath sounds: Normal breath sounds.   Abdominal:      Palpations: Abdomen is soft.      Tenderness: There is no abdominal tenderness.   Musculoskeletal:      Right lower leg: No edema.      Left lower leg: No edema.   Skin:     Comments: Cellultis of the dorsum of left foot with well healing surgical wound   Neurological:      General: No focal deficit present.      Mental Status: He is alert.   Psychiatric:         Thought Content: Thought content normal.           Assessment & Plan:   1. Subacute osteomyelitis of left foot (HCC) (Primary) continue Levaquin and doxycycline  2. Coronary artery disease involving native coronary artery of native heart  without angina pectoris on metoprlol and crestor  3. Diabetes mellitus type 1 with complications (HCC)on tresiba insulin and Pioglitazone orally  4. Chf - compensated with Torsemide 20 mg daily      No follow-ups on file.    Susana Magallon MD, 11/25/2024, 11:18 AM

## 2024-11-26 NOTE — PAYOR COMM NOTE
--------------  DISCHARGE REVIEW    Payor: [employer] ADV PPO  Subscriber #:  [id number]  Authorization Number: [id number]    Admit date: 24  Admit time:  11:27 AM  Discharge Date: 2024  5:10 PM     Admitting Physician: Jorge Barone MD  Attending Physician:  No att. providers found  Primary Care Physician: Susana Magallon MD          Discharge Summary Notes        Discharge Summary signed by Sofia Boyce MD at 2024  3:45 PM       Author: Sofia Boyce MD Specialty: HOSPITALIST Author Type: Physician    Filed: 2024  3:45 PM Date of Service: 2024  3:33 PM Status: Addendum    : Sofia Boyce MD (Physician)    Related Notes: Original Note by Sofia Boyce MD (Physician) filed at 2024  3:38 PM           [facility]  part of [facility]    Discharge Summary    Davin Hassan Patient Status:  Inpatient    1955 MRN [id number]   Location [facility] 3W/SW Attending Sofia Boyce MD   Hosp Day # 7 PCP Susana Magallon MD     Date of Admission: 2024      Date of Discharge: 24    Admitting Diagnosis: Abnormal nuclear stress test [R94.39]  Abnormal echocardiogram [R93.1]    Hospital Discharge Diagnoses:  LLE Cellulitis with 5th toe OM  LLE foot abscess  Multivessel CAD    Lace+ Score: 29  59-90 High Risk  29-58 Medium Risk  0-28   Low Risk.    TCM Follow-Up Recommendation:  LACE < 29: Low Risk of readmission after discharge from the hospital; Still recommend for TCM follow-up.          Problem List:   Patient Active Problem List   Diagnosis    Alternating esotropia    Blepharitis of upper and lower eyelids of both eyes    Myopia of both eyes with astigmatism and presbyopia    Age-related nuclear cataract of both eyes    BDR (background diabetic retinopathy) (HCC)    Diabetic macular edema of both eyes (HCC)    Primary open angle glaucoma (POAG) of both eyes    CAD (coronary artery disease)    Pre-op testing     Osteomyelitis of fifth toe of left foot (HCC)    Acute osteomyelitis of right foot (HCC)         Physical Exam:   Vitals:    11/21/24 0931   BP: 115/47   Pulse: 68   Resp: 18   Temp: 98.2 °F (36.8 °C)         History of Present Illness: see HPI    Hospital Course:   67 y/o male with Pmhx of Insulin-dependent diabetes mellitus type 2, hyperlipidemia, peripheral diabetic neuropathy, glaucoma, and peripheral arterial disease.he was evaluated by Cardiology and had a stress test which was abnormal.  Presented for scheduled LHC on 11/14/24 which was revealing for severe triple-vessel disease with mild-mod LV dysfunction. Admitted for treatment of LE wound and cellulitis and early OM. Had bone bx 11/19. Evaluated by CV surgery. Plans for surgery but timing to be determined pending how infection resolves.  He will dc home on PO abx per ID recs . Infection is not systemic.  F/u for final pathology of bone bx results, cx with serratia. CVS to decide on when to do surgery. On PO aspirin, statin, arb, bb.   He did have mild Acute on Chronic HFmrEF exacerbation improved with iv lasix now on Po torsemide.      Cardiac meds as below per cardio. DC on PO abx.   Resume other home meds. Updated meds below.   F/u with [facility] and cardio as outpatient.     Discharge Condition: Good    Discharge Medications:      Discharge Medications        START taking these medications        Instructions Prescription details   aspirin 81 MG Tbec      Take 1 tablet (81 mg total) by mouth daily.   Quantity: 30 tablet  Refills: 5     levoFLOXacin 750 MG Tabs  Commonly known as: Levaquin      Take 1 tablet (750 mg total) by mouth daily for 28 days.   Stop taking on: December 19, 2024  Quantity: 28 tablet  Refills: 0     losartan 25 MG Tabs  Commonly known as: Cozaar  Start taking on: November 22, 2024      Take 1 tablet (25 mg total) by mouth daily.   Quantity: 30 tablet  Refills: 3     metoprolol succinate ER 50 MG Tb24  Commonly known as: Toprol XL       Take 1 tablet (50 mg total) by mouth daily.   Quantity: 30 tablet  Refills: 3     rosuvastatin 40 MG Tabs  Commonly known as: Crestor      Take 1 tablet (40 mg total) by mouth nightly.   Quantity: 30 tablet  Refills: 3     torsemide 20 MG Tabs  Commonly known as: Demadex  Start taking on: November 22, 2024      Take 1 tablet (20 mg total) by mouth daily.   Quantity: 30 tablet  Refills: 3            CONTINUE taking these medications        Instructions Prescription details   Accu-Chek Soft Touch Lancets Misc       Refills: 0     acetaminophen 500 MG Tabs  Commonly known as: Tylenol Extra Strength      Take 1 tablet (500 mg total) by mouth every 6 (six) hours as needed for Pain.   Refills: 0     doxycycline 100 MG Caps  Commonly known as: Vibramycin      Take 1 capsule (100 mg total) by mouth 2 (two) times daily for 28 days.   Stop taking on: December 19, 2024  Quantity: 56 capsule  Refills: 0     Glucose Blood Strp       Refills: 0     insulin degludec 100 units/mL Sopn  Commonly known as: Tresiba      Inject 28 Units into the skin nightly.   Refills: 0     Insulin Pen Needle 31G X 6 MM Misc       Refills: 0     latanoprost 0.005 % Soln  Commonly known as: Xalatan      INSTILL 1 DROP INTO BOTH   EYES EVERY NIGHT   Quantity: 7.5 mL  Refills: 3     NovoLOG FlexPen 100 UNIT/ML Sopn  Generic drug: insulin aspart      Inject 8 Units into the skin 3 (three) times daily before meals. Following a sliding scale at home.   Refills: 0            STOP taking these medications      AMOXICILLIN OR                  Where to Get Your Medications        These medications were sent to [id number] - [place], [place] - [address] AT [facility], [phone], [phone]  [address], [facility] [address]      Hours: 24-hours Phone: [phone]   aspirin 81 MG Tbec  doxycycline 100 MG Caps  levoFLOXacin 750 MG Tabs  losartan 25 MG Tabs  metoprolol succinate ER 50 MG  Tb24  rosuvastatin 40 MG Tabs  torsemide 20 MG Tabs             Sofia Boyce MD  11/21/2024  3:33 PM    Greater than 30 minutes spent on preparation and coordination of this discharge    Electronically signed by Sofia Boyce MD on 11/21/2024  3:45 PM         REVIEWER COMMENTS

## 2024-12-01 ENCOUNTER — APPOINTMENT (OUTPATIENT)
Dept: MRI IMAGING | Facility: HOSPITAL | Age: 69
End: 2024-12-01
Attending: EMERGENCY MEDICINE
Payer: MEDICARE

## 2024-12-01 ENCOUNTER — HOSPITAL ENCOUNTER (INPATIENT)
Facility: HOSPITAL | Age: 69
LOS: 2 days | Discharge: HOME | End: 2024-12-03
Attending: EMERGENCY MEDICINE | Admitting: HOSPITALIST
Payer: MEDICARE

## 2024-12-01 ENCOUNTER — APPOINTMENT (OUTPATIENT)
Dept: GENERAL RADIOLOGY | Facility: HOSPITAL | Age: 69
End: 2024-12-01
Attending: EMERGENCY MEDICINE
Payer: MEDICARE

## 2024-12-01 DIAGNOSIS — I95.1 ORTHOSTATIC HYPOTENSION: ICD-10-CM

## 2024-12-01 DIAGNOSIS — N17.9 ACUTE KIDNEY INJURY (HCC): ICD-10-CM

## 2024-12-01 DIAGNOSIS — E86.0 DEHYDRATION: ICD-10-CM

## 2024-12-01 DIAGNOSIS — R42 DIZZINESS: Primary | ICD-10-CM

## 2024-12-01 LAB
ALBUMIN SERPL-MCNC: 4.1 G/DL (ref 3.2–4.8)
ALP LIVER SERPL-CCNC: 81 U/L
ALT SERPL-CCNC: 15 U/L
ANION GAP SERPL CALC-SCNC: 7 MMOL/L (ref 0–18)
AST SERPL-CCNC: 21 U/L (ref ?–34)
BASOPHILS # BLD AUTO: 0.04 X10(3) UL (ref 0–0.2)
BASOPHILS NFR BLD AUTO: 0.4 %
BILIRUB DIRECT SERPL-MCNC: 0.1 MG/DL (ref ?–0.3)
BILIRUB SERPL-MCNC: 0.4 MG/DL (ref 0.2–1.1)
BUN BLD-MCNC: 44 MG/DL (ref 9–23)
BUN/CREAT SERPL: 23 (ref 10–20)
CALCIUM BLD-MCNC: 9.5 MG/DL (ref 8.7–10.4)
CHLORIDE SERPL-SCNC: 102 MMOL/L (ref 98–112)
CO2 SERPL-SCNC: 29 MMOL/L (ref 21–32)
CREAT BLD-MCNC: 1.91 MG/DL
DEPRECATED RDW RBC AUTO: 40.1 FL (ref 35.1–46.3)
EGFRCR SERPLBLD CKD-EPI 2021: 38 ML/MIN/1.73M2 (ref 60–?)
EOSINOPHIL # BLD AUTO: 0.11 X10(3) UL (ref 0–0.7)
EOSINOPHIL NFR BLD AUTO: 1.2 %
ERYTHROCYTE [DISTWIDTH] IN BLOOD BY AUTOMATED COUNT: 11.8 % (ref 11–15)
GLUCOSE BLD-MCNC: 294 MG/DL (ref 70–99)
GLUCOSE BLDC GLUCOMTR-MCNC: 294 MG/DL (ref 70–99)
GLUCOSE BLDC GLUCOMTR-MCNC: 300 MG/DL (ref 70–99)
GLUCOSE BLDC GLUCOMTR-MCNC: 303 MG/DL (ref 70–99)
HCT VFR BLD AUTO: 35 %
HGB BLD-MCNC: 12.2 G/DL
IMM GRANULOCYTES # BLD AUTO: 0.02 X10(3) UL (ref 0–1)
IMM GRANULOCYTES NFR BLD: 0.2 %
LYMPHOCYTES # BLD AUTO: 1.33 X10(3) UL (ref 1–4)
LYMPHOCYTES NFR BLD AUTO: 14.6 %
MCH RBC QN AUTO: 32.5 PG (ref 26–34)
MCHC RBC AUTO-ENTMCNC: 34.9 G/DL (ref 31–37)
MCV RBC AUTO: 93.3 FL
MONOCYTES # BLD AUTO: 0.69 X10(3) UL (ref 0.1–1)
MONOCYTES NFR BLD AUTO: 7.6 %
NEUTROPHILS # BLD AUTO: 6.91 X10 (3) UL (ref 1.5–7.7)
NEUTROPHILS # BLD AUTO: 6.91 X10(3) UL (ref 1.5–7.7)
NEUTROPHILS NFR BLD AUTO: 76 %
OSMOLALITY SERPL CALC.SUM OF ELEC: 308 MOSM/KG (ref 275–295)
PLATELET # BLD AUTO: 143 10(3)UL (ref 150–450)
POTASSIUM SERPL-SCNC: 4.3 MMOL/L (ref 3.5–5.1)
PROT SERPL-MCNC: 6.8 G/DL (ref 5.7–8.2)
RBC # BLD AUTO: 3.75 X10(6)UL
SODIUM SERPL-SCNC: 138 MMOL/L (ref 136–145)
TROPONIN I SERPL HS-MCNC: 17 NG/L
WBC # BLD AUTO: 9.1 X10(3) UL (ref 4–11)

## 2024-12-01 PROCEDURE — 70551 MRI BRAIN STEM W/O DYE: CPT | Performed by: EMERGENCY MEDICINE

## 2024-12-01 PROCEDURE — 71045 X-RAY EXAM CHEST 1 VIEW: CPT | Performed by: EMERGENCY MEDICINE

## 2024-12-01 PROCEDURE — 99223 1ST HOSP IP/OBS HIGH 75: CPT | Performed by: HOSPITALIST

## 2024-12-01 RX ORDER — SODIUM CHLORIDE 9 MG/ML
125 INJECTION, SOLUTION INTRAVENOUS CONTINUOUS
Status: DISCONTINUED | OUTPATIENT
Start: 2024-12-01 | End: 2024-12-02

## 2024-12-01 RX ORDER — NICOTINE POLACRILEX 4 MG
30 LOZENGE BUCCAL
Status: DISCONTINUED | OUTPATIENT
Start: 2024-12-01 | End: 2024-12-03

## 2024-12-01 RX ORDER — METOCLOPRAMIDE HYDROCHLORIDE 5 MG/ML
5 INJECTION INTRAMUSCULAR; INTRAVENOUS EVERY 8 HOURS PRN
Status: DISCONTINUED | OUTPATIENT
Start: 2024-12-01 | End: 2024-12-03

## 2024-12-01 RX ORDER — TEMAZEPAM 15 MG/1
15 CAPSULE ORAL NIGHTLY PRN
Status: DISCONTINUED | OUTPATIENT
Start: 2024-12-01 | End: 2024-12-03

## 2024-12-01 RX ORDER — LEVOFLOXACIN 750 MG/1
750 TABLET, FILM COATED ORAL
Status: DISCONTINUED | OUTPATIENT
Start: 2024-12-02 | End: 2024-12-03

## 2024-12-01 RX ORDER — DEXTROSE MONOHYDRATE 25 G/50ML
50 INJECTION, SOLUTION INTRAVENOUS
Status: DISCONTINUED | OUTPATIENT
Start: 2024-12-01 | End: 2024-12-03

## 2024-12-01 RX ORDER — ONDANSETRON 2 MG/ML
4 INJECTION INTRAMUSCULAR; INTRAVENOUS EVERY 6 HOURS PRN
Status: DISCONTINUED | OUTPATIENT
Start: 2024-12-01 | End: 2024-12-03

## 2024-12-01 RX ORDER — NICOTINE POLACRILEX 4 MG
15 LOZENGE BUCCAL
Status: DISCONTINUED | OUTPATIENT
Start: 2024-12-01 | End: 2024-12-03

## 2024-12-01 RX ORDER — DOXYCYCLINE 100 MG/1
100 CAPSULE ORAL EVERY 12 HOURS SCHEDULED
Status: DISCONTINUED | OUTPATIENT
Start: 2024-12-01 | End: 2024-12-03

## 2024-12-01 RX ORDER — ACETAMINOPHEN 500 MG
500 TABLET ORAL EVERY 4 HOURS PRN
Status: DISCONTINUED | OUTPATIENT
Start: 2024-12-01 | End: 2024-12-03

## 2024-12-01 RX ORDER — SODIUM CHLORIDE 9 MG/ML
INJECTION, SOLUTION INTRAVENOUS CONTINUOUS
Status: DISCONTINUED | OUTPATIENT
Start: 2024-12-01 | End: 2024-12-02

## 2024-12-01 RX ORDER — HEPARIN SODIUM 5000 [USP'U]/ML
5000 INJECTION, SOLUTION INTRAVENOUS; SUBCUTANEOUS EVERY 12 HOURS SCHEDULED
Status: DISCONTINUED | OUTPATIENT
Start: 2024-12-01 | End: 2024-12-03

## 2024-12-01 NOTE — ED QUICK NOTES
Orders for admission, patient is aware of plan and ready to go upstairs. Any questions, please call ED ADITI Marin at extension [phone].     Patient Covid vaccination status: Fully vaccinated     COVID Test Ordered in ED: None    COVID Suspicion at Admission: N/A    Running Infusions:    sodium chloride          Mental Status/LOC at time of transport: a/o x 4    Other pertinent information: Pt is from home. Pt is independent with ambulation at baseline. Currently pt gets dizzy upon standing.  CIWA score: N/A   NIH score:  N/A

## 2024-12-01 NOTE — ED INITIAL ASSESSMENT (HPI)
Pt arrived per EMS. Pt was found on floor by EMS. Pt states became very dizzy (room spinning) and \"drifted to the floor\". Pt was recently started on new blood pressure medications.

## 2024-12-01 NOTE — ED PROVIDER NOTES
Patient Seen in: [facility] Emergency Department      History     Chief Complaint   Patient presents with    Dizziness     Stated Complaint: DIZZINESS    Subjective:   HPI      Patient is a 68-year-old gentleman with a history of independent diabetes hyperlipidemia peripheral diabetic neuropathy glaucoma peripheral artery disease recent abnormal stress test that led to angiogram that showed triple-vessel disease.  He was awaiting bypass surgery after an infection and possible osteomyelitis of his left foot was treated.  He is on p.o. antibiotics and states is getting better.  He presents today because he feels dizzy.  He states when he stands up he feels quite dizzy.  Describes it as spinning sensation.  No headache no visual complaints no focal weakness numbness or tingling he states when he sits back down he feels fine.  He attributes this to his new blood pressure medicines.    Objective:     Past Medical History:    Alternating esotropia    Alternating esotropia    BDR (background diabetic retinopathy) ([facility])    under the care of Dr. Brito- see progress note    Colon adenomas    x4    Diabetic macular edema of left eye ([facility])    S/P Eylea injection OS x 5 with Dr. Brito    Glaucoma suspect of both eyes    Primary open angle glaucoma (POAG) of left eye, mild stage    2/4/19 start Latanoprost qhs OS due to thinning of the optic nerve on OCT    Type II or unspecified type diabetes mellitus without mention of complication, not stated as uncontrolled              Past Surgical History:   Procedure Laterality Date    Colonoscopy  03/2008    Colonoscopy  04/05/2023    Strabismus surgery Bilateral 1959                Social History     Socioeconomic History    Marital status:    Tobacco Use    Smoking status: Never    Smokeless tobacco: Never   Vaping Use    Vaping status: Never Used   Substance and Sexual Activity    Alcohol use: Yes     Alcohol/week: 1.0 standard drink of alcohol     Types: 1  Glasses of wine per week     Comment: rarely, beer & liquor, 1 drink     Drug use: No   Other Topics Concern    Caffeine Concern Yes     Comment: coffee, soda, 3 cups daily     Social Drivers of Health     Financial Resource Strain: Low Risk  (11/22/2024)    Financial Resource Strain     Med Affordability: No   Food Insecurity: No Food Insecurity (11/14/2024)    Food Insecurity     Food Insecurity: Never true   Transportation Needs: No Transportation Needs (11/22/2024)    Transportation Needs     Lack of Transportation: No   Housing Stability: Low Risk  (11/14/2024)    Housing Stability     Housing Instability: No                  Physical Exam     ED Triage Vitals [12/01/24 1534]   /67   Pulse 67   Resp 20   Temp 98.7 °F (37.1 °C)   Temp src Oral   SpO2 98 %   O2 Device        Current Vitals:   Vital Signs  BP: (!) 55/38  Pulse: 71  Resp: 20  Temp: 98.7 °F (37.1 °C)  Temp src: Oral    Oxygen Therapy  SpO2: 98 %        Physical Exam  Constitutional: Oriented to person, place, and time. Appears well-developed and well-nourished.   HEENT:   Head: Normocephalic and atraumatic.   Right Ear: External ear normal.   Left Ear: External ear normal.   Nose: Nose normal.   Mouth/Throat: Oropharynx is clear and moist.   Eyes: Conjunctivae and EOM are normal. Pupils are equal, round, and reactive to light.   Neck: Neck supple.   Cardiovascular: Normal rate, regular rhythm, normal heart sounds and intact distal pulses.    Pulmonary/Chest: Effort normal and breath sounds normal. No respiratory distress.   Abdominal: Soft. Bowel sounds are normal. Exhibits no distension and no mass. There is no tenderness. There is no rebound and no guarding.   Musculoskeletal: Normal range of motion.  Left foot is bandaged  Lymphadenopathy: No cervical adenopathy.   Neurological: Alert and oriented to person, place, and time. Normal reflexes. No cranial nerve deficit. No motor os sensory defecits noted Coordination normal.   Skin: Skin is  warm and dry.   Psychiatric: Normal mood and affect. Behavior is normal. Judgment and thought content normal.   Nursing note and vitals reviewed.        ED Course     Labs Reviewed   CBC WITH DIFFERENTIAL WITH PLATELET - Abnormal; Notable for the following components:       Result Value    RBC 3.75 (*)     HGB 12.2 (*)     HCT 35.0 (*)     .0 (*)     All other components within normal limits   BASIC METABOLIC PANEL (8) - Abnormal; Notable for the following components:    Glucose 294 (*)     BUN 44 (*)     Creatinine 1.91 (*)     BUN/CREA Ratio 23.0 (*)     Calculated Osmolality 308 (*)     eGFR-Cr 38 (*)     All other components within normal limits   POCT GLUCOSE - Abnormal; Notable for the following components:    POC Glucose  300 (*)     All other components within normal limits   HEPATIC FUNCTION PANEL (7) - Normal   TROPONIN I HIGH SENSITIVITY - Normal   RAINBOW DRAW LAVENDER   RAINBOW DRAW LIGHT GREEN   RAINBOW DRAW BLUE     EKG    Rate, intervals and axes as noted on EKG Report.  Rate: 66  Rhythm: Sinus Rhythm  Reading: Normal sinus rhythm Q waves inferiorly and anteriorly                  MRI BRAIN WO ACUTE (3) SEQUENCE (CPT=70551)    Result Date: 12/1/2024  CONCLUSION:  1. Limited ER protocol 3 sequence imaging of the brain was performed.  No acute intracranial abnormality. Specifically, no evidence of acute or early subacute infarction. 2. Chronic right cerebellar lacunar infarct.   Dictated by (CST): Shay Lechuga MD on 12/01/2024 at 5:04 PM     Finalized by (CST): Shay Lechuga MD on 12/01/2024 at 5:05 PM          XR CHEST AP PORTABLE  (CPT=71045)    Result Date: 12/1/2024  CONCLUSION:   Negative for radiographically evident acute intrathoracic process.   Dictated by (CST): Shay Lechuga MD on 12/01/2024 at 4:44 PM     Finalized by (CST): Shay Lechuga MD on 12/01/2024 at 4:45 PM          XR FOOT, COMPLETE (MIN 3 VIEWS), LEFT (CPT=73630)    Result Date: 11/19/2024  CONCLUSION:  1. Changes  related to biopsy of 5th metatarsal , proximal and middle phalanx of small toe.     Dictated by (CST): Jose Eduardo Ren MD on 11/19/2024 at 9:32 PM     Finalized by (CST): Jose Eduardo Ren MD on 11/19/2024 at 9:37 PM          US VENOUS DOPPLER LEG LEFT - DIAG IMG (CPT=93971)    Result Date: 11/19/2024  CONCLUSION: Normal examination.     Dictated by (CST): Anthony Landon MD on 11/19/2024 at 10:30 AM     Finalized by (CST): Anthony Landon MD on 11/19/2024 at 10:30 AM          CTA ABDOMEN/PELVIS LOWER EXT BILAT W RUNOFF (GDC=73781)    Result Date: 11/18/2024  CONCLUSION:   2.6 cm segment of complete occlusion involving the left tibioperoneal trunk.  Retrograde filling of the left peroneal and posterior tibial arteries.  Mild atherosclerotic calcification of the remainder of the bilateral lower extremity vasculature without other areas of high-grade stenosis.  Soft tissue gas within the left foot, grossly unchanged.  Multiple other incidental findings as described in the body of the report.      Dictated by (CST): Bruce Mcclellan MD on 11/18/2024 at 1:45 PM     Finalized by (CST): Bruce Mcclellan MD on 11/18/2024 at 1:56 PM              MDM      Use of independent historian: Paramedics provide history they state that with a put patient in Trendelenburg he felt much better.    I personally reviewed and interpreted the images :     MRI BRAIN WO ACUTE (3) SEQUENCE (CPT=70551)    Result Date: 12/1/2024  CONCLUSION:  1. Limited ER protocol 3 sequence imaging of the brain was performed.  No acute intracranial abnormality. Specifically, no evidence of acute or early subacute infarction. 2. Chronic right cerebellar lacunar infarct.   Dictated by (CST): Shay Lechuga MD on 12/01/2024 at 5:04 PM     Finalized by (CST): Shay Lechuga MD on 12/01/2024 at 5:05 PM          XR CHEST AP PORTABLE  (CPT=71045)    Result Date: 12/1/2024  CONCLUSION:   Negative for radiographically evident acute intrathoracic process.   Dictated by (CST):  Shay Lechuga MD on 12/01/2024 at 4:44 PM     Finalized by (CST): Shay Lechuga MD on 12/01/2024 at 4:45 PM           Vitals:    12/01/24 1534 12/01/24 1557 12/01/24 1558 12/01/24 1601   BP: 119/67 101/52 94/53 (!) 55/38   Pulse: 67 61 64 71   Resp: 20      Temp: 98.7 °F (37.1 °C)      TempSrc: Oral      SpO2: 98%      Weight: 89.4 kg      Height: 185.4 cm (6' 1\")        *I personally reviewed and interpreted all ED vitals.    Pulse Ox: 98%, Room air, Normal     EKG interpretation above independently interpreted by me    Monitor Interpretation:   normal sinus rhythm independently interpreted by me    Differential Diagnosis/ Diagnostic Considerations: Patient with dizziness may be related to orthostatics from his medications consider electrolyte disturbance consider vertigo consider CVA in light of recent angiogram.    Medical Record Review: I personally reviewed available prior medical records for any recent pertinent discharge summaries, testing, and procedures and reviewed those reports and found discharge summary 11/21/2024 reviewed..    Complicating Factors: The patient already has coronary artery disease osteomyelitis diabetes which contribute to the complexity of this ED evaluation.    Social determinants of health:    Prescription drug management:      Shared Decision Making:    ED Course: Orthostatic vital signs performed.  His blood pressure went to 55 when he stood up and he felt very lightheaded.  IV fluid bolus noted.  BUN and creatinine elevation noted as well.  Awaiting MRI.  Findings discussed with patient.  He states that he normally weighs about 206.  When he was volume overloaded he weighed 230 recently he has been 190.    Discussion of management with other healthcare providers:    Condition upon leaving the department: Stable      Admission disposition: 12/1/2024  5:25 PM           Medical Decision Making      Disposition and Plan     Clinical Impression:  1. Dizziness    2. Orthostatic  hypotension    3. Dehydration         Disposition:  Admit  12/1/2024  5:25 pm    Follow-up:  No follow-up provider specified.  We recommend that you schedule follow up care with a primary care provider within the next three months to obtain basic health screening including reassessment of your blood pressure.      Medications Prescribed:  Current Discharge Medication List              Supplementary Documentation:         Hospital Problems       Present on Admission  Date Reviewed: 11/25/2024            ICD-10-CM Noted POA    * (Principal) Dizziness R42 12/1/2024 Unknown

## 2024-12-01 NOTE — HISTORICAL OFFICE NOTE
Recently discharged from [facility] on 11/21/24. Please see notes in chart review.    Last office note prior to this admission as below:    Facility Logo [facility]  [address], [address] [place] [address]  [phone]    Document Amended  Davin Hassan  Progress Note  Demographics:  Name: Davin Hassan YOB: 1955  Age: 68, Male Medical Record No: [id number]  Visited Date/Time: 10/11/2024 02:20 PM    Chief Complaints  Follow up  review test results  History of Present Illness  Patient is here for follow-up appointment.  Underwent recent stress test which was significantly abnormal.  Echo showed reduced ejection fraction.  He did not have any symptoms while he was on the stress test.  Cardiac risk factors Never smoked  Past Medical History  1.Preop testing  2.Palpitations  3.Hypercholesterolemia  4.DM (diabetes mellitus) Type 2  5.PVD (peripheral vascular disease) ([facility])  Family History  1. Father - CAD native (coronary artery disease)  Social History  Smoking status Never smoked  Tobacco usage - No (Non-smoker for personal reasons (finding))  Review of systems  Cardiovascular LIRIANO  No history of Chest pain and Palpitations  Physical Examination  Constitutional 02 97%  Vitals Left Arm Sitting  / 60 mmHg, Pulse rate 59 bpm, Regular, Height in 6' 1\", BMI: 28.1, Weight in 213 lbs (or) 96.62 kgs and BSA : 2.25 cc/m²  Head/Eyes/Ears/Nose/Mouth/Throat EOMI and PERRLA  Neck No carotid bruits and No JVD  Respiratory Unlabored, Lungs clear with normal breath sounds and Equal bilaterally  Cardiovascular Regular rhythm. Normal and normal S1 and S2    Gastrointestinal Abdomen soft and Non-tender  Gait Normal gait  Upper Extremities No clubbing, No cyanosis and No edema  Lower Extremities Pulses 2+ and equal bilaterally  Skin Warm and dry  Allergies  No medication allergies noted.  Medications (Info obtained by: Verbal)  1.ergocalciferol (vitamin D2) 1,250 mcg  (50,000 unit) capsule, Take 1 capsule orally once a week  2.latanoprost 0.005 % Ophthalmic Solution, INSTILL 1 DROP INTO BOTH EYES EVERY NIGHT  3.NEXLIZET 180-10 MG Oral Tab, Take 1 tablet by mouth daily.  4.NovoLOG Mix 70-30 FlexPen U-100 Insulin 100 unit/mL subcutaneous pen, Inject (subcutaneous) as needed  5.Ozempic 2 mg/dose (8 mg/3 mL) subcutaneous pen injector, Pen Injector (subcutaneous) once a week  6.pioglitazone 15 mg tablet, Take 1 tablet orally once a day.  7.repaglinide 1 mg tablet, Take 1 tablet orally once a day.  8.Tresiba FlexTouch U-100 insulin 100 unit/mL (3 mL) subcutaneous pen, Inject 28ml (subcutaneous) once in the evening.  9.aspirin 81 mg tablet,delayed release, Take 1 tablet orally once a day.  Impression  1.Palpitations  2.LIRIANO (dyspnea on exertion)  3.Hypercholesterolemia  4.DM (diabetes mellitus) Type 2  5.PVD (peripheral vascular disease) ([facility])  Assessment & Plan  Start aspirin 81 mg once a day  Discussed with the patient regarding her results and severely abnormal stress test  Patient tells me he is going to [place] [employer] in 2 weeks and would not cancel his trip  Advised against doing so  Also told him to go to the ER if he had chest pain  Patient to schedule left or right heart cath with possible intervention when he returns from [facility] on November 8  Follow-up with me 1 week after procedure completed  Labs and Diagnostics ordered  1.EKG (electrocardiogram) (Schedule next available)  2.Chest x-ray, PA and lateral (Schedule next available)  3.BMP (Basic Metabolic Panel) (Schedule next available)  4.CBC (Complete Blood Count) (Schedule next available)  Future appointments  1.Referral Visit - Susana Magallon ([email or web address]) : (Today)  2.Follow up visit - Jorge Barone MD F/U 1 week after procedure (Schedule next available)  Miscellaneous  1.Weight monitoring (regime/therapy)  Nurses documentation  EKG none  refills-  Assistive devices- none  Patient instructions  Start aspirin 81  mg once a day  Discussed with the patient regarding her results and severely abnormal stress test  Patient tells me he is going to [place] in 2 weeks and would not cancel his trip  Advised against doing so  Also told him to go to the ER if he had chest pain  Patient to schedule left or right heart cath with possible intervention when he returns from [facility] on November 8  Follow-up with me 1 week after procedure completed    CPOE Orders carried out by: Graciela JONES  Care Providers: Jorge Barone MD and Graciela JONES  Electronically Authenticated by  Jorge Barone MD  10/11/2024 03:41:51 PM  Amendment Request : 1  Clarification order per Dr Jorge Barone : schedule left and right heart cath with possible intervention  Requested By Initiated By Accepted By  Tricia Barone MD  10/11/2024 05:04:11 PM 10/11/2024 05:04:11 PM 10/15/2024 04:31:22 PM  Amendment Request : 2  Clarification order per Dr Barone, scheduled left and right heart cath with possible intervention  Requested By Initiated By Accepted By  Tricia Barone MD  10/14/2024 03:44:16 PM 10/14/2024 03:44:16 PM 10/15/2024 09:45:04 AM  Disclaimer: Components of this note were documented using voice recognition system and are subject to errors not corrected at proofreading. Contact the author of this note for any clarifications.

## 2024-12-02 ENCOUNTER — PATIENT OUTREACH (OUTPATIENT)
Dept: CASE MANAGEMENT | Age: 69
End: 2024-12-02

## 2024-12-02 LAB
ANION GAP SERPL CALC-SCNC: 5 MMOL/L (ref 0–18)
ANION GAP SERPL CALC-SCNC: 6 MMOL/L (ref 0–18)
ATRIAL RATE: 66 BPM
BASOPHILS # BLD AUTO: 0.04 X10(3) UL (ref 0–0.2)
BASOPHILS NFR BLD AUTO: 0.5 %
BUN BLD-MCNC: 38 MG/DL (ref 9–23)
BUN BLD-MCNC: 41 MG/DL (ref 9–23)
BUN/CREAT SERPL: 23 (ref 10–20)
BUN/CREAT SERPL: 26.3 (ref 10–20)
CALCIUM BLD-MCNC: 10.1 MG/DL (ref 8.7–10.4)
CALCIUM BLD-MCNC: 9.4 MG/DL (ref 8.7–10.4)
CHLORIDE SERPL-SCNC: 106 MMOL/L (ref 98–112)
CHLORIDE SERPL-SCNC: 108 MMOL/L (ref 98–112)
CO2 SERPL-SCNC: 29 MMOL/L (ref 21–32)
CO2 SERPL-SCNC: 31 MMOL/L (ref 21–32)
CREAT BLD-MCNC: 1.56 MG/DL
CREAT BLD-MCNC: 1.65 MG/DL
DEPRECATED RDW RBC AUTO: 40.4 FL (ref 35.1–46.3)
EGFRCR SERPLBLD CKD-EPI 2021: 46 ML/MIN/1.73M2 (ref 60–?)
EGFRCR SERPLBLD CKD-EPI 2021: 49 ML/MIN/1.73M2 (ref 60–?)
EOSINOPHIL # BLD AUTO: 0.32 X10(3) UL (ref 0–0.7)
EOSINOPHIL NFR BLD AUTO: 3.8 %
ERYTHROCYTE [DISTWIDTH] IN BLOOD BY AUTOMATED COUNT: 11.7 % (ref 11–15)
GLUCOSE BLD-MCNC: 170 MG/DL (ref 70–99)
GLUCOSE BLD-MCNC: 267 MG/DL (ref 70–99)
GLUCOSE BLDC GLUCOMTR-MCNC: 133 MG/DL (ref 70–99)
GLUCOSE BLDC GLUCOMTR-MCNC: 201 MG/DL (ref 70–99)
GLUCOSE BLDC GLUCOMTR-MCNC: 215 MG/DL (ref 70–99)
GLUCOSE BLDC GLUCOMTR-MCNC: 246 MG/DL (ref 70–99)
HCT VFR BLD AUTO: 33.2 %
HGB BLD-MCNC: 11.2 G/DL
IMM GRANULOCYTES # BLD AUTO: 0.02 X10(3) UL (ref 0–1)
IMM GRANULOCYTES NFR BLD: 0.2 %
LYMPHOCYTES # BLD AUTO: 1.62 X10(3) UL (ref 1–4)
LYMPHOCYTES NFR BLD AUTO: 19.3 %
MCH RBC QN AUTO: 31.6 PG (ref 26–34)
MCHC RBC AUTO-ENTMCNC: 33.7 G/DL (ref 31–37)
MCV RBC AUTO: 93.8 FL
MONOCYTES # BLD AUTO: 0.7 X10(3) UL (ref 0.1–1)
MONOCYTES NFR BLD AUTO: 8.3 %
NEUTROPHILS # BLD AUTO: 5.7 X10 (3) UL (ref 1.5–7.7)
NEUTROPHILS # BLD AUTO: 5.7 X10(3) UL (ref 1.5–7.7)
NEUTROPHILS NFR BLD AUTO: 67.9 %
OSMOLALITY SERPL CALC.SUM OF ELEC: 307 MOSM/KG (ref 275–295)
OSMOLALITY SERPL CALC.SUM OF ELEC: 315 MOSM/KG (ref 275–295)
P AXIS: 41 DEGREES
P-R INTERVAL: 188 MS
PLATELET # BLD AUTO: 133 10(3)UL (ref 150–450)
PLATELETS.RETICULATED NFR BLD AUTO: 2.5 % (ref 0–7)
POTASSIUM SERPL-SCNC: 4.4 MMOL/L (ref 3.5–5.1)
POTASSIUM SERPL-SCNC: 4.8 MMOL/L (ref 3.5–5.1)
Q-T INTERVAL: 410 MS
QRS DURATION: 112 MS
QTC CALCULATION (BEZET): 429 MS
R AXIS: 8 DEGREES
RBC # BLD AUTO: 3.54 X10(6)UL
SODIUM SERPL-SCNC: 142 MMOL/L (ref 136–145)
SODIUM SERPL-SCNC: 143 MMOL/L (ref 136–145)
T AXIS: -33 DEGREES
VENTRICULAR RATE: 66 BPM
WBC # BLD AUTO: 8.4 X10(3) UL (ref 4–11)

## 2024-12-02 PROCEDURE — 99233 SBSQ HOSP IP/OBS HIGH 50: CPT | Performed by: HOSPITALIST

## 2024-12-02 RX ORDER — ROSUVASTATIN CALCIUM 20 MG/1
40 TABLET, COATED ORAL NIGHTLY
Status: DISCONTINUED | OUTPATIENT
Start: 2024-12-02 | End: 2024-12-03

## 2024-12-02 RX ORDER — ASPIRIN 81 MG/1
81 TABLET ORAL DAILY
Status: DISCONTINUED | OUTPATIENT
Start: 2024-12-02 | End: 2024-12-03

## 2024-12-02 RX ORDER — METOPROLOL SUCCINATE 50 MG/1
50 TABLET, EXTENDED RELEASE ORAL DAILY
Status: DISCONTINUED | OUTPATIENT
Start: 2024-12-02 | End: 2024-12-03

## 2024-12-02 RX ORDER — INSULIN DEGLUDEC 100 U/ML
20 INJECTION, SOLUTION SUBCUTANEOUS DAILY
Status: DISCONTINUED | OUTPATIENT
Start: 2024-12-02 | End: 2024-12-03

## 2024-12-02 NOTE — PROGRESS NOTES
[facility]  part of [facility]    Progress Note    Davin Hassan Patient Status:  Inpatient    1955 MRN [id number]   Location [facility] 3W/SW Attending Melvin Rush MD   Hosp Day # 1 PCP Susana Magallon MD       Subjective:   Davin Hassan feels better and wants to go home.    Review of Systems:   10 point ROS completed and was negative, except for pertinent positive and negatives stated in subjective.    Objective:     Vitals:    24 2055 24 0500 24 0554 24 0622   BP: 110/58  137/65    BP Location: Right arm  Right arm    Pulse:   65 (!) 48   Resp:   18    Temp:   98.1 °F (36.7 °C)    TempSrc:   Oral    SpO2:   97%    Weight:  204 lb 6.4 oz (92.7 kg)     Height:         Patient Weight for the past 72 hrs:   Weight   24 1534 197 lb (89.4 kg)   24 1838 205 lb 3.2 oz (93.1 kg)   24 0500 204 lb 6.4 oz (92.7 kg)       Intake/Output Summary (Last 24 hours) at 2024 0741  Last data filed at 2024 0554  Gross per 24 hour   Intake 596.67 ml   Output 200 ml   Net 396.67 ml       GENERAL:  The patient appeared to be in no distress  SKIN:  Warm and hydrated  HEENT:  Head was atraumatic and normocephalic.    CHEST:  Symmetrical movement on inspiration  LUNGS:  No audible wheezing  ABDOMEN: Non-distended  MUSCULOSKELETAL:  There was no deformity.    EXTREMITIES: There was no edema  NEUROLOGICAL:  There was no focal deficit.    PSYCHIATRIC: Calm and cooperative     Current Scheduled Inpatient Meds:      heparin  5,000 Units Subcutaneous 2 times per day    insulin aspart  1-7 Units Subcutaneous TID CC    levoFLOXacin  750 mg Oral Q48H    doxycycline  100 mg Oral 2 times per day       Current PRN Inpatient Meds:        acetaminophen    ondansetron    metoclopramide    temazepam    glucose **OR** glucose **OR** glucose-vitamin C **OR** dextrose **OR** glucose **OR** glucose **OR** glucose-vitamin C    Results:     Lab Results    Component Value Date    WBC 8.4 12/02/2024    HGB 11.2 (L) 12/02/2024    HCT 33.2 (L) 12/02/2024    .0 (L) 12/02/2024    CREATSERUM 1.56 (H) 12/02/2024    BUN 41 (H) 12/02/2024     12/02/2024    K 4.4 12/02/2024     12/02/2024    CO2 29.0 12/02/2024     (H) 12/02/2024    CA 9.4 12/02/2024    ALB 4.1 12/01/2024    ALKPHO 81 12/01/2024    BILT 0.4 12/01/2024    TP 6.8 12/01/2024    AST 21 12/01/2024    ALT 15 12/01/2024    T4F 1.0 07/09/2024    TSH 2.619 07/09/2024    PSA 1.8 12/26/2018       Recent Labs   Lab 12/01/24  1551 12/02/24  0640   RBC 3.75* 3.54*   HGB 12.2* 11.2*   HCT 35.0* 33.2*   MCV 93.3 93.8   MCH 32.5 31.6   MCHC 34.9 33.7   RDW 11.8 11.7   NEPRELIM 6.91 5.70   WBC 9.1 8.4   .0* 133.0*     Recent Labs   Lab 12/01/24  1551 12/02/24  0640   * 267*   BUN 44* 41*   CREATSERUM 1.91* 1.56*   CA 9.5 9.4   ALB 4.1  --     143   K 4.3 4.4    108   CO2 29.0 29.0   ALKPHO 81  --    AST 21  --    ALT 15  --    BILT 0.4  --    TP 6.8  --      No results found for: \"PT\", \"INR\"    Culture:  No results found for this visit on 12/01/24.    Imaging/EKG:   MRI BRAIN WO ACUTE (3) SEQUENCE (CPT=70551)    Result Date: 12/1/2024  CONCLUSION:  1. Limited ER protocol 3 sequence imaging of the brain was performed.  No acute intracranial abnormality. Specifically, no evidence of acute or early subacute infarction. 2. Chronic right cerebellar lacunar infarct.   Dictated by (CST): Shay Lechuga MD on 12/01/2024 at 5:04 PM     Finalized by (CST): Shay Lechuga MD on 12/01/2024 at 5:05 PM          XR CHEST AP PORTABLE  (CPT=71045)    Result Date: 12/1/2024  CONCLUSION:   Negative for radiographically evident acute intrathoracic process.   Dictated by (CST): Shay Lechuga MD on 12/01/2024 at 4:44 PM     Finalized by (CST): Shay Lechuga MD on 12/01/2024 at 4:45 PM         EKG 12 Lead    Result Date: 12/2/2024  Normal sinus rhythm Inferior infarct , age undetermined  Possible Anterolateral infarct , age undetermined Abnormal ECG No previous ECGs found in [facility]   Assessment and Plan:   The patient is a 68-year-old  male with hx of CAD awaiting CABG, left fifth metatarsal osteomyelitis, DM2 on insulin, peripheral diabetic neuropathy, and peripheral arterial disease, who presents with a syncopal episode that lasted briefly earlier today after finishing lunch.    # Dizziness  # Orthostatic hypotension  - BP lying 101/52 and standing 55/38 on arrival  - MRI brain showed no acute intracranial abnormality, but a chronic right cerebellar lacunar infarct.   - IVF  - Cards on consult    # CAD awaiting CABG  # Hypertension  # Hyperlipidemia  # Peripheral arterial disease  - continue home meds  - Cards on consult    #  Acute kidney injury   - baseline Cr around 1, up to 1.9 on arrival  - on IVF, Cr down to 1.6 today    # DM2 on insulin  # Peripheral diabetic neuropathy  - A1c 8.9 last month  - Degludec 20u, SSI AC/HS    # Left fifth metatarsal osteomyelitis,  - s/p I&D procedure and bone biopsy  - currently on oral doxycycline and Levaquin until 12/19/24.     Diet: carb controlled  PT/OT: deferred  DVT ppx: heparin  Code status: Full  Dispo: per clinical course    MDM: high Melvin Rush MD, PhD  Message via Secure Chat  [facility]

## 2024-12-02 NOTE — PROGRESS NOTES
Progress Note  Davin Hassan Patient Status:  Inpatient    1955 MRN [id number]   Location [facility] 3W/SW Attending Melvin Rush MD   Hosp Day # 1 PCP Susana Magallon MD     Subjective:  No acute events overnight.  Resting in bed this morning, on room air, denies any cardiac symptoms currently. Dizziness resolved after the IVF, orthostatic vitals still positive but significantly improved and patient feels asymptomatic.  Asking to go home later today.       Objective:  /65 (BP Location: Right arm)   Pulse (!) 48   Temp 98.1 °F (36.7 °C) (Oral)   Resp 18   Ht 6' 1\" (1.854 m)   Wt 204 lb 6.4 oz (92.7 kg)   SpO2 97%   BMI 26.97 kg/m²     Telemetry: SR, HR 60s, NSVT      Intake/Output:    Intake/Output Summary (Last 24 hours) at 2024 0836  Last data filed at 2024 0554  Gross per 24 hour   Intake 596.67 ml   Output 200 ml   Net 396.67 ml       Last 3 Weights   24 0500 204 lb 6.4 oz (92.7 kg)   24 1838 205 lb 3.2 oz (93.1 kg)   24 1534 197 lb (89.4 kg)   24 1542 197 lb 1.5 oz (89.4 kg)   24 1056 198 lb (89.8 kg)       Labs:  Recent Labs   Lab 24  1551 24  0640   * 267*   BUN 44* 41*   CREATSERUM 1.91* 1.56*   EGFRCR 38* 49*   CA 9.5 9.4    143   K 4.3 4.4    108   CO2 29.0 29.0     Recent Labs   Lab 24  1551 24  0640   RBC 3.75* 3.54*   HGB 12.2* 11.2*   HCT 35.0* 33.2*   MCV 93.3 93.8   MCH 32.5 31.6   MCHC 34.9 33.7   RDW 11.8 11.7   NEPRELIM 6.91 5.70   WBC 9.1 8.4   .0* 133.0*         Recent Labs   Lab 24  1551   TROPHS 17       Review of Systems   Constitutional: Negative.   Cardiovascular: Negative.    Respiratory: Negative.     Skin:  Positive for poor wound healing.       Physical Exam:    Gen: alert, oriented x 3, NAD  Heent: pupils equal, reactive. Mucous membranes moist.   Neck: no jvd  Cardiac: regular rate and rhythm, normal S1,S2, no murmur, gallop or rub   Lungs:  CTA  Abd: soft, NT/ND +bs  Ext: no edema, LLE wound, dressing c,d,i  Skin: Warm, dry  Neuro: No focal deficits        Medications:     aspirin  81 mg Oral Daily    metoprolol succinate ER  50 mg Oral Daily    rosuvastatin  40 mg Oral Nightly    insulin degludec  20 Units Subcutaneous Daily    heparin  5,000 Units Subcutaneous 2 times per day    insulin aspart  1-7 Units Subcutaneous TID CC    levoFLOXacin  750 mg Oral Q48H    doxycycline  100 mg Oral 2 times per day      sodium chloride 100 mL/hr at 12/01/24 1908    sodium chloride 125 mL/hr (12/01/24 1849)     Assessment:  Dizziness appears multifactorial dehydration, orthostatic hypotension likely related to medications      EKG, NSR, no acute ST changes   Orthostatic vitals positive   On IVF  On toprol, losartan, torsemide PTA -now held    JACLYN, crt improving with IVF  Multivessel CAD with plans for CABG once foot infection resolves   S/P LHC  11/14/24 showing severe triple-vessel disease with mild-mod LV dysfunction   On asa, statin, bb   HFmrEF, 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  Holding losartan, joleen, torsemide, will likely resume in op settings pending renal function and BP stability.   Continue toprol, asa, statin.  Continue abx per primary.  Discharge planing in progress pending clinical course and renal function improvement, hopefully later today vs tomorrow.  Patient awaiting CABG once LLE infections resolves.    Plan of care discussed with patient, RN.    Kaia Rendon, APRN  12/2/2024  8:36 AM  [phone]

## 2024-12-02 NOTE — PLAN OF CARE
Alert x4. VSS. Iv fluids dc'd. Dc home when cleared. Monitoring kidney function.   Problem: Patient Centered Care  Goal: Patient preferences are identified and integrated in the patient's plan of care  Description: Interventions:  - What would you like us to know as we care for you? Live with my wife  - Provide timely, complete, and accurate information to patient/family  - Incorporate patient and family knowledge, values, beliefs, and cultural backgrounds into the planning and delivery of care  - Encourage patient/family to participate in care and decision-making at the level they choose  - Honor patient and family perspectives and choices  Outcome: Progressing     Problem: Patient/Family Goals  Goal: Patient/Family Long Term Goal  Description: Patient's Long Term Goal: no further hospital visit    Interventions:  - med change  - See additional Care Plan goals for specific interventions  Outcome: Progressing  Goal: Patient/Family Short Term Goal  Description: Patient's Short Term Goal: go home    Interventions:   - bmp- kidney function improves  - See additional Care Plan goals for specific interventions  Outcome: Progressing     Problem: PAIN - ADULT  Goal: Verbalizes/displays adequate comfort level or patient's stated pain goal  Description: INTERVENTIONS:  - Encourage pt to monitor pain and request assistance  - Assess pain using appropriate pain scale  - Administer analgesics based on type and severity of pain and evaluate response  - Implement non-pharmacological measures as appropriate and evaluate response  - Consider cultural and social influences on pain and pain management  - Manage/alleviate anxiety  - Utilize distraction and/or relaxation techniques  - Monitor for opioid side effects  - Notify MD/LIP if interventions unsuccessful or patient reports new pain  - Anticipate increased pain with activity and pre-medicate as appropriate  Outcome: Progressing     Problem: CARDIOVASCULAR - ADULT  Goal:  Maintains optimal cardiac output and hemodynamic stability  Description: INTERVENTIONS:  - Monitor vital signs, rhythm, and trends  - Monitor for bleeding, hypotension and signs of decreased cardiac output  - Evaluate effectiveness of vasoactive medications to optimize hemodynamic stability  - Monitor arterial and/or venous puncture sites for bleeding and/or hematoma  - Assess quality of pulses, skin color and temperature  - Assess for signs of decreased coronary artery perfusion - ex. Angina  - Evaluate fluid balance, assess for edema, trend weights  Outcome: Progressing  Goal: Absence of cardiac arrhythmias or at baseline  Description: INTERVENTIONS:  - Continuous cardiac monitoring, monitor vital signs, obtain 12 lead EKG if indicated  - Evaluate effectiveness of antiarrhythmic and heart rate control medications as ordered  - Initiate emergency measures for life threatening arrhythmias  - Monitor electrolytes and administer replacement therapy as ordered  Outcome: Progressing

## 2024-12-02 NOTE — PROGRESS NOTES
Patient received from ED, VSS, he asked to \"walk to bed\" feeling like he could, alert, oriented and conversational. Endorsed admission to night shift.

## 2024-12-02 NOTE — H&P
[facility]    PATIENT'S NAME: OMA MEDINA   ATTENDING PHYSICIAN: Gucci Adamson MD   PATIENT ACCOUNT#:   [id number]    LOCATION:  [place] [address]  MEDICAL RECORD #:   [id number]       YOB: 1955  ADMISSION DATE:       12/01/2024    HISTORY AND PHYSICAL EXAMINATION    CHIEF COMPLAINT:  Acute kidney injury, hypovolemia, and syncope.    HISTORY OF PRESENT ILLNESS:  The patient is a 68-year-old  male who came into the emergency department for a syncopal episode that lasted briefly earlier today after finishing lunch with his family.  Chemistry showed GFR of 38, which is below his baseline, BUN and creatinine of 44 and 1.91.  CBC showed hemoglobin of 12.2, which is at baseline.  Liver function tests and troponin were negative.  EKG showed normal sinus rhythm.  MRI scan of the brain showed no acute findings.  Chest x-ray showed no acute findings.  The patient was started on IV fluids, and he will be admitted to the hospital for further management.    PAST MEDICAL HISTORY:  Recent cardiac angiogram which showed multivessel coronary artery disease requiring evaluation by Cardiovascular Surgery for coronary artery bypass graft surgery.  Bypass procedure has been delayed because of diagnosis of left fifth metatarsal osteomyelitis, required I and D procedure and bone biopsy.  He was started on IV antibiotics and currently on oral doxycycline and Levaquin until December 19.  He had insulin-dependent diabetes mellitus type 2, hyperlipidemia, peripheral diabetic neuropathy, glaucoma, and peripheral arterial disease.    PAST SURGICAL HISTORY:  As mentioned above, left foot I and D procedure and bone biopsy.    MEDICATIONS:  Please see medication reconciliation list.    ALLERGIES:  No known drug allergies.    FAMILY HISTORY:  Mother had breast cancer.  Father had diabetes mellitus type 2.    SOCIAL HISTORY:  No tobacco or drug use.  Occasional alcohol.  Lives with his family.   Independent in his basic activities of daily living.    REVIEW OF SYSTEMS:  The patient reports that he has been feeling lightheaded and dizzy, especially when he stands up recently.  He has been taking his medications, including torsemide, metoprolol, losartan.  Today, after finishing lunch with his family, he stood up, felt lightheaded and dizzy, and his son caught him and he did not fall to the ground.  Brought into the emergency department for evaluation.  Upon arrival noted to have systolic blood pressure of 119.  Orthostatic measurement blood pressure dropped to 55 systolic.  Other 12-point review of systems is negative.    PHYSICAL EXAMINATION:  GENERAL:  Alert and oriented to time, place, and person.  No acute distress, appears fatigued.  VITAL SIGNS:  Temperature 98.7, pulse 71, respiratory rate 20, blood pressure 55/38 upon standing up, Oxygen saturation 98%  HEENT:  Atraumatic.  Oropharynx clear.  Dry mucous membranes.  Ears, nose normal.  Eyes:  Anicteric sclerae.  NECK:  Supple.  No lymphadenopathy.  Trachea midline.  Full range of motion.  LUNGS:  Clear to auscultation bilaterally.  Normal respiratory effort.  HEART:  Regular rate and rhythm.  S1 and S2 auscultated.  No murmur.  ABDOMEN:  Soft, nondistended.  No tenderness.  Positive bowel sounds.  EXTREMITIES:  No peripheral edema, clubbing, or cyanosis.  Left anterior foot dorsal fifth ray surgical area without complications or dehiscence.  No erythema or drainage.  NEUROLOGIC:  Motor and sensory intact.    ASSESSMENT:  1.   Acute kidney injury.  2.   Orthostatic hypotension with syncope.  3.   Diabetes mellitus type 2.  4.   Coronary artery disease.    PLAN:  The patient will be admitted to general medical floor.  Monitor hemodynamic status.  Hold losartan and diuretics.  IV fluids.  Monitor Accu-Cheks.  Monitor his kidney function.  Fall precautions.  Cardiology consult.  Further recommendations to follow.    Dictated By Raza Ornelas  MD  d: 12/01/2024 17:14:08  t: 12/01/2024 17:23:25  Job [id number]  FB/

## 2024-12-02 NOTE — CONSULTS
[facility] - CARDIOLOGY CONSULT NOTE    Davin Hassan Patient Status:  Inpatient    1955 MRN [id number]   Location [facility] 3W/SW Attending Raza Ornelas MD   Hosp Day # 0 PCP Susana Magallon MD     Date of Admission:  2024  Date of Consult:  2024  I was asked by Raza Ornelas MD to provide recommendations for evaluation and management of cardiac issues.  Impression:     Coronary disease  Recently discovered three-vessel disease  Plan for CABG once infection cleared from possible osteomyelitis of the foot    Dizziness  Appears related to medications and orthostatic hypotension dehydration a multifactorial    Diabetes  Poorly controlled    Foot infection    Recommendations:    Hold blood pressure medications and hydration    Reason for Consultation:     Dizziness    History of Present Illness:   Patient is a 68 year old male who was admitted to the hospital for dizziness orthostatic hypotension.    Patient with diabetes, dyslipidemia, peripheral diabetic neuropathy, glaucoma, peripheral arterial disease who recently had abnormal stress test and angiogram showed three-vessel disease recommended for bypass surgery but need infected toe cleared and first.  Send p.o. antibiotics.  Came in because he was feeling dizzy when he stands up he was lightheaded and blood pressure was lower.      Cardiac tests:  Glucose 294  Creatinine 1.9  BUN 44  Troponin 17  Hemoglobin 12.2  EKG normal sinus rhythm 66  Results:     Lab Results   Component Value Date    WBC 9.1 2024    HGB 12.2 (L) 2024    HCT 35.0 (L) 2024    .0 (L) 2024    CREATSERUM 1.91 (H) 2024    BUN 44 (H) 2024     2024    K 4.3 2024     2024    CO2 29.0 2024     (H) 2024    CA 9.5 2024    ALB 4.1 2024    ALKPHO 81 2024    BILT 0.4 2024    TP 6.8 2024    AST 21 2024    ALT 15 2024     T4F 1.0 07/09/2024    TSH 2.619 07/09/2024    PSA 1.8 12/26/2018       MRI BRAIN WO ACUTE (3) SEQUENCE (CPT=70551)    Result Date: 12/1/2024  CONCLUSION:  1. Limited ER protocol 3 sequence imaging of the brain was performed.  No acute intracranial abnormality. Specifically, no evidence of acute or early subacute infarction. 2. Chronic right cerebellar lacunar infarct.   Dictated by (CST): Shay Lechuga MD on 12/01/2024 at 5:04 PM     Finalized by (CST): Shay Lechuga MD on 12/01/2024 at 5:05 PM          XR CHEST AP PORTABLE  (CPT=71045)    Result Date: 12/1/2024  CONCLUSION:   Negative for radiographically evident acute intrathoracic process.   Dictated by (CST): Shay Lechuga MD on 12/01/2024 at 4:44 PM     Finalized by (CST): Shay Lechuga MD on 12/01/2024 at 4:45 PM         EKG 12 Lead    Result Date: 12/1/2024  Normal sinus rhythm Inferior infarct , age undetermined Possible Anterolateral infarct , age undetermined Abnormal ECG No previous ECGs found in [facility]     Past Medical History  Past Medical History:    Alternating esotropia    Alternating esotropia    BDR (background diabetic retinopathy) ([facility])    under the care of Dr. Brito- see progress note    Colon adenomas    x4    Diabetic macular edema of left eye ([facility])    S/P Eylea injection OS x 5 with Dr. Brito    Glaucoma suspect of both eyes    Primary open angle glaucoma (POAG) of left eye, mild stage    2/4/19 start Latanoprost qhs OS due to thinning of the optic nerve on OCT    Type II or unspecified type diabetes mellitus without mention of complication, not stated as uncontrolled       Past Surgical History  Past Surgical History:   Procedure Laterality Date    Colonoscopy  03/2008    Colonoscopy  04/05/2023    Strabismus surgery Bilateral 1959       Family History  Family History   Problem Relation Age of Onset    Diabetes Father     Glaucoma Father     Macular degeneration Father     Breast Cancer Mother     Heart Disorder Brother         Social History  Pediatric History   Patient Parents    Not on file     Other Topics Concern     Service Not Asked    Blood Transfusions Not Asked    Caffeine Concern Yes     Comment: coffee, soda, 3 cups daily    Occupational Exposure Not Asked    Hobby Hazards Not Asked    Sleep Concern Not Asked    Stress Concern Not Asked    Weight Concern Not Asked    Special Diet Not Asked    Back Care Not Asked    Exercise Not Asked    Bike Helmet Not Asked    Seat Belt Not Asked    Self-Exams Not Asked   Social History Narrative    Not on file           Current Medications:  Current Facility-Administered Medications   Medication Dose Route Frequency    sodium chloride 0.9% infusion   Intravenous Continuous    heparin (Porcine) 5000 UNIT/ML injection 5,000 Units  5,000 Units Subcutaneous 2 times per day    acetaminophen (Tylenol Extra Strength) tab 500 mg  500 mg Oral Q4H PRN    ondansetron (Zofran) 4 MG/2ML injection 4 mg  4 mg Intravenous Q6H PRN    metoclopramide (Reglan) 5 mg/mL injection 5 mg  5 mg Intravenous Q8H PRN    temazepam (Restoril) cap 15 mg  15 mg Oral Nightly PRN    glucose (Dex4) 15 GM/59ML oral liquid 15 g  15 g Oral Q15 Min PRN    Or    glucose (Glutose) 40% oral gel 15 g  15 g Oral Q15 Min PRN    Or    glucose-vitamin C (Dex-4) chewable tab 4 tablet  4 tablet Oral Q15 Min PRN    Or    dextrose 50% injection 50 mL  50 mL Intravenous Q15 Min PRN    Or    glucose (Dex4) 15 GM/59ML oral liquid 30 g  30 g Oral Q15 Min PRN    Or    glucose (Glutose) 40% oral gel 30 g  30 g Oral Q15 Min PRN    Or    glucose-vitamin C (Dex-4) chewable tab 8 tablet  8 tablet Oral Q15 Min PRN    insulin aspart (NovoLOG) 100 Units/mL FlexPen 1-7 Units  1-7 Units Subcutaneous TID CC    [START ON 12/2/2024] levoFLOXacin (Levaquin) tab 750 mg  750 mg Oral Daily    doxycycline (Vibramycin) cap 100 mg  100 mg Oral 2 times per day    sodium chloride 0.9% infusion  125 mL/hr Intravenous Continuous     Medications Prior to  Admission   Medication Sig    Continuous Glucose Sensor (DEXCOM G7 SENSOR) Does not apply Misc 1 each Every 10 days.    ergocalciferol 1.25 MG (03860 UT) Oral Cap Take 1 capsule (50,000 Units total) by mouth once a week.    pioglitazone 15 MG Oral Tab Take 1 tablet (15 mg total) by mouth daily.    doxycycline 100 MG Oral Cap Take 1 capsule (100 mg total) by mouth 2 (two) times daily for 28 days.    levoFLOXacin 750 MG Oral Tab Take 1 tablet (750 mg total) by mouth daily for 28 days.    losartan 25 MG Oral Tab Take 1 tablet (25 mg total) by mouth daily.    rosuvastatin 40 MG Oral Tab Take 1 tablet (40 mg total) by mouth nightly.    torsemide 20 MG Oral Tab Take 1 tablet (20 mg total) by mouth daily.    metoprolol succinate ER 50 MG Oral Tablet 24 Hr Take 1 tablet (50 mg total) by mouth daily.    aspirin 81 MG Oral Tab EC Take 1 tablet (81 mg total) by mouth daily.    acetaminophen 500 MG Oral Tab Take 1 tablet (500 mg total) by mouth every 6 (six) hours as needed for Pain. (Patient not taking: Reported on 11/22/2024)    insulin degludec 100 units/mL Subcutaneous Solution Pen-injector Inject 28 Units into the skin nightly.    latanoprost 0.005 % Ophthalmic Solution INSTILL 1 DROP INTO BOTH   EYES EVERY NIGHT    NOVOLOG FLEXPEN 100 UNIT/ML Subcutaneous Solution Pen-injector Inject 8 Units into the skin 3 (three) times daily before meals. Following a sliding scale at home.    Accu-Chek Soft Touch Lancets Does not apply Misc  (Patient not taking: Reported on 11/22/2024)    Glucose Blood In Vitro Strip  (Patient not taking: Reported on 11/22/2024)    Insulin Pen Needle 31G X 6 MM Does not apply Misc        Allergies  Allergies[1]    Review of Systems:   10 pt ROS performed, separate from HPI  Review of Systems:  GENERAL: no fevers, chills, sweats  HEENT: no visual or hearing changes  SKIN: denies any unusual skin lesions or rashes  RESPIRATORY: denies shortness of breath with exertion  CARDIOVASCULAR: no active chest  pain, no claudication  GI: denies abdominal pain and denies heartburn  : no dysuria or hematuria  NEURO: denies headaches, focal weaknesses or paresthesias  All other systems reviewed and negative.  fatigue is present  All other systems were reviewed are negative  Physical Exam:   Blood pressure 122/67, pulse 68, temperature 97.6 °F (36.4 °C), temperature source Oral, resp. rate 20, height 6' 1\" (1.854 m), weight 205 lb 3.2 oz (93.1 kg), SpO2 94%.    Scheduled Meds:    heparin  5,000 Units Subcutaneous 2 times per day    insulin aspart  1-7 Units Subcutaneous TID CC    [START ON 12/2/2024] levoFLOXacin  750 mg Oral Daily    doxycycline  100 mg Oral 2 times per day         Physical Exam:    General: Alert and oriented. No apparent distress. No respiratory or constitutional distress.  HEENT: Normocephalic, anicteric sclera, neck supple  Neck: No JVD, carotids 2+, supple  Cardiac: Regular rate. No pathologic murmur.  Lungs: Clear with normal effort.  Normal excursions and effort.  Abdomen: Soft, non-tender. BS-present.  Extremities: Without clubbing, cyanosis.  Peripheral pulsespresent.  Neurologic: Alert and oriented, normal affect. Motor ok.  Skin: Warm and dry.     Imaging: I independently visualized all relevant chest imaging in PACS, agree with radiology interpretation except where noted.  Thank you for allowing me to participate in the care of your patient.    Tyshawn Apodaca MD  [facility] [place]  Cardiac Electrophysiology  12/1/2024  5 level consult         [1] No Known Allergies

## 2024-12-02 NOTE — PLAN OF CARE
Patient alert and oriented on room air with no complaints of pain. Orthostatics done overnight. Patient reports no dizziness overnight or this AM. Call light in reach and IVF continued. No needs noted at this time  Problem: Patient Centered Care  Goal: Patient preferences are identified and integrated in the patient's plan of care  Description: Interventions:  - What would you like us to know as we care for you?   - Provide timely, complete, and accurate information to patient/family  - Incorporate patient and family knowledge, values, beliefs, and cultural backgrounds into the planning and delivery of care  - Encourage patient/family to participate in care and decision-making at the level they choose  - Honor patient and family perspectives and choices  Outcome: Progressing     Problem: Patient/Family Goals  Goal: Patient/Family Long Term Goal  Description: Patient's Long Term Goal:    Interventions:  - See additional Care Plan goals for specific interventions  Outcome: Progressing  Goal: Patient/Family Short Term Goal  Description: Patient's Short Term Goal:    Interventions:   - See additional Care Plan goals for specific interventions  Outcome: Progressing     Problem: PAIN - ADULT  Goal: Verbalizes/displays adequate comfort level or patient's stated pain goal  Description: INTERVENTIONS:  - Encourage pt to monitor pain and request assistance  - Assess pain using appropriate pain scale  - Administer analgesics based on type and severity of pain and evaluate response  - Implement non-pharmacological measures as appropriate and evaluate response  - Consider cultural and social influences on pain and pain management  - Manage/alleviate anxiety  - Utilize distraction and/or relaxation techniques  - Monitor for opioid side effects  - Notify MD/LIP if interventions unsuccessful or patient reports new pain  - Anticipate increased pain with activity and pre-medicate as appropriate  Outcome: Progressing     Problem:  CARDIOVASCULAR - ADULT  Goal: Maintains optimal cardiac output and hemodynamic stability  Description: INTERVENTIONS:  - Monitor vital signs, rhythm, and trends  - Monitor for bleeding, hypotension and signs of decreased cardiac output  - Evaluate effectiveness of vasoactive medications to optimize hemodynamic stability  - Monitor arterial and/or venous puncture sites for bleeding and/or hematoma  - Assess quality of pulses, skin color and temperature  - Assess for signs of decreased coronary artery perfusion - ex. Angina  - Evaluate fluid balance, assess for edema, trend weights  Outcome: Progressing  Goal: Absence of cardiac arrhythmias or at baseline  Description: INTERVENTIONS:  - Continuous cardiac monitoring, monitor vital signs, obtain 12 lead EKG if indicated  - Evaluate effectiveness of antiarrhythmic and heart rate control medications as ordered  - Initiate emergency measures for life threatening arrhythmias  - Monitor electrolytes and administer replacement therapy as ordered  Outcome: Progressing

## 2024-12-03 VITALS
WEIGHT: 199 LBS | HEIGHT: 73 IN | RESPIRATION RATE: 18 BRPM | TEMPERATURE: 98 F | SYSTOLIC BLOOD PRESSURE: 108 MMHG | HEART RATE: 67 BPM | OXYGEN SATURATION: 96 % | DIASTOLIC BLOOD PRESSURE: 64 MMHG | BODY MASS INDEX: 26.37 KG/M2

## 2024-12-03 LAB
ANION GAP SERPL CALC-SCNC: 5 MMOL/L (ref 0–18)
BUN BLD-MCNC: 33 MG/DL (ref 9–23)
BUN/CREAT SERPL: 27 (ref 10–20)
CALCIUM BLD-MCNC: 9.7 MG/DL (ref 8.7–10.4)
CHLORIDE SERPL-SCNC: 109 MMOL/L (ref 98–112)
CO2 SERPL-SCNC: 29 MMOL/L (ref 21–32)
CREAT BLD-MCNC: 1.22 MG/DL
EGFRCR SERPLBLD CKD-EPI 2021: 65 ML/MIN/1.73M2 (ref 60–?)
GLUCOSE BLD-MCNC: 142 MG/DL (ref 70–99)
GLUCOSE BLDC GLUCOMTR-MCNC: 121 MG/DL (ref 70–99)
OSMOLALITY SERPL CALC.SUM OF ELEC: 306 MOSM/KG (ref 275–295)
POTASSIUM SERPL-SCNC: 4.1 MMOL/L (ref 3.5–5.1)
SODIUM SERPL-SCNC: 143 MMOL/L (ref 136–145)

## 2024-12-03 PROCEDURE — 99239 HOSP IP/OBS DSCHRG MGMT >30: CPT | Performed by: HOSPITALIST

## 2024-12-03 NOTE — PLAN OF CARE
Patient alert and oriented on room air with no complaints of pain or dizziness overnight. Call light in reach and no needs noted at this time. Patient showered overnight and eager for discharge.  Problem: Patient Centered Care  Goal: Patient preferences are identified and integrated in the patient's plan of care  Description: Interventions:  - What would you like us to know as we care for you?   - Provide timely, complete, and accurate information to patient/family  - Incorporate patient and family knowledge, values, beliefs, and cultural backgrounds into the planning and delivery of care  - Encourage patient/family to participate in care and decision-making at the level they choose  - Honor patient and family perspectives and choices  Outcome: Progressing     Problem: Patient/Family Goals  Goal: Patient/Family Long Term Goal  Description: Patient's Long Term Goal:     Interventions  - See additional Care Plan goals for specific interventions  Outcome: Progressing  Goal: Patient/Family Short Term Goal  Description: Patient's Short Term Goal:     Interventions:  - See additional Care Plan goals for specific interventions  Outcome: Progressing     Problem: PAIN - ADULT  Goal: Verbalizes/displays adequate comfort level or patient's stated pain goal  Description: INTERVENTIONS:  - Encourage pt to monitor pain and request assistance  - Assess pain using appropriate pain scale  - Administer analgesics based on type and severity of pain and evaluate response  - Implement non-pharmacological measures as appropriate and evaluate response  - Consider cultural and social influences on pain and pain management  - Manage/alleviate anxiety  - Utilize distraction and/or relaxation techniques  - Monitor for opioid side effects  - Notify MD/LIP if interventions unsuccessful or patient reports new pain  - Anticipate increased pain with activity and pre-medicate as appropriate  Outcome: Progressing     Problem: CARDIOVASCULAR -  ADULT  Goal: Maintains optimal cardiac output and hemodynamic stability  Description: INTERVENTIONS:  - Monitor vital signs, rhythm, and trends  - Monitor for bleeding, hypotension and signs of decreased cardiac output  - Evaluate effectiveness of vasoactive medications to optimize hemodynamic stability  - Monitor arterial and/or venous puncture sites for bleeding and/or hematoma  - Assess quality of pulses, skin color and temperature  - Assess for signs of decreased coronary artery perfusion - ex. Angina  - Evaluate fluid balance, assess for edema, trend weights  Outcome: Progressing  Goal: Absence of cardiac arrhythmias or at baseline  Description: INTERVENTIONS:  - Continuous cardiac monitoring, monitor vital signs, obtain 12 lead EKG if indicated  - Evaluate effectiveness of antiarrhythmic and heart rate control medications as ordered  - Initiate emergency measures for life threatening arrhythmias  - Monitor electrolytes and administer replacement therapy as ordered  Outcome: Progressing

## 2024-12-03 NOTE — PLAN OF CARE
Patient alert and oriented x 4. Vitals stable on room air. Patient denies pain. Patient cleared for discharge home with outpatient follow-up. AVS completed and discussed with patient. There were no further questions. Patient to transport home by wife.    Problem: Patient Centered Care  Goal: Patient preferences are identified and integrated in the patient's plan of care  Description: Interventions:  - What would you like us to know as we care for you? From home with wife  - Provide timely, complete, and accurate information to patient/family  - Incorporate patient and family knowledge, values, beliefs, and cultural backgrounds into the planning and delivery of care  - Encourage patient/family to participate in care and decision-making at the level they choose  - Honor patient and family perspectives and choices  Outcome: Adequate for Discharge     Problem: Patient/Family Goals  Goal: Patient/Family Long Term Goal  Description: Patient's Long Term Goal: Be able to discharge    Interventions:  - Monitor BP  - Monitor labs  - See additional Care Plan goals for specific interventions  Outcome: Adequate for Discharge  Goal: Patient/Family Short Term Goal  Description: Patient's Short Term Goal: Resolve Hypotension    Interventions:   - Titrate BP meds  - See additional Care Plan goals for specific interventions  Outcome: Adequate for Discharge     Problem: PAIN - ADULT  Goal: Verbalizes/displays adequate comfort level or patient's stated pain goal  Description: INTERVENTIONS:  - Encourage pt to monitor pain and request assistance  - Assess pain using appropriate pain scale  - Administer analgesics based on type and severity of pain and evaluate response  - Implement non-pharmacological measures as appropriate and evaluate response  - Consider cultural and social influences on pain and pain management  - Manage/alleviate anxiety  - Utilize distraction and/or relaxation techniques  - Monitor for opioid side effects  -  Notify MD/LIP if interventions unsuccessful or patient reports new pain  - Anticipate increased pain with activity and pre-medicate as appropriate  Outcome: Adequate for Discharge     Problem: CARDIOVASCULAR - ADULT  Goal: Maintains optimal cardiac output and hemodynamic stability  Description: INTERVENTIONS:  - Monitor vital signs, rhythm, and trends  - Monitor for bleeding, hypotension and signs of decreased cardiac output  - Evaluate effectiveness of vasoactive medications to optimize hemodynamic stability  - Monitor arterial and/or venous puncture sites for bleeding and/or hematoma  - Assess quality of pulses, skin color and temperature  - Assess for signs of decreased coronary artery perfusion - ex. Angina  - Evaluate fluid balance, assess for edema, trend weights  Outcome: Adequate for Discharge  Goal: Absence of cardiac arrhythmias or at baseline  Description: INTERVENTIONS:  - Continuous cardiac monitoring, monitor vital signs, obtain 12 lead EKG if indicated  - Evaluate effectiveness of antiarrhythmic and heart rate control medications as ordered  - Initiate emergency measures for life threatening arrhythmias  - Monitor electrolytes and administer replacement therapy as ordered  Outcome: Adequate for Discharge

## 2024-12-03 NOTE — CDS QUERY
Dear Doctor Victor Manuel,    Clinical information (provided below) includes a diagnosis of Heart Failure. Please specify acuity/type.  .       (  x ) Chronic Systolic Heart Failure    (    ) Other - please specify: _______________________________      Clinical Indicators: Echo 10/10/24 - ejection fraction was 45%,  C    Cardiology PN 12/3 - HFmrEF, LVEF 45%/ischemic cardiomyopathy      Risk Factor: 68 year old with diabetes, dyslipidemia, peripheral diabetic neuropathy, peripheral arterial disease, three-vessel disease who was admitted for dizziness orthostatic hypotension.     Treatment: Cardiology consult, Toprol XL        Use of terms such as suspected, possible, or probable (associated with a specific diagnosis that is being evaluated, monitored, or treated as if it exists) are acceptable and can be coded in the inpatient setting, when documented at the time of discharge.    Please add any additional documentation to your progress note and continue to document this through discharge.     If you have any questions, please contact Clinical :  Deisy CHRISTIANSON at [phone]     Thank You!     THIS FORM IS A PERMANENT PART OF THE MEDICAL RECORD

## 2024-12-03 NOTE — PROGRESS NOTES
Progress Note  Davin Hassan Patient Status:  Inpatient    1955 MRN [id number]   Location [facility] 3W/SW Attending Melvin Rush MD   Hosp Day # 2 PCP Susana Magallon MD     Subjective:  No acute events overnight.  Ambulating around the room, denies any cardiac symptoms currently. Dizziness resolved, SBP remains stable.        Objective:  /64 (BP Location: Right arm)   Pulse 67   Temp 98.2 °F (36.8 °C) (Oral)   Resp 18   Ht 6' 1\" (1.854 m)   Wt 199 lb (90.3 kg)   SpO2 96%   BMI 26.25 kg/m²     Telemetry: SR, HR 60s      Intake/Output:    Intake/Output Summary (Last 24 hours) at 12/3/2024 0902  Last data filed at 12/3/2024 0415  Gross per 24 hour   Intake 1510 ml   Output 1200 ml   Net 310 ml       Last 3 Weights   24 0505 199 lb (90.3 kg)   24 0500 204 lb 6.4 oz (92.7 kg)   24 1838 205 lb 3.2 oz (93.1 kg)   24 1534 197 lb (89.4 kg)   24 1542 197 lb 1.5 oz (89.4 kg)   24 1056 198 lb (89.8 kg)       Labs:  Recent Labs   Lab 24  0640 24  1454 24  0635   * 170* 142*   BUN 41* 38* 33*   CREATSERUM 1.56* 1.65* 1.22   EGFRCR 49* 46* 65   CA 9.4 10.1 9.7    142 143   K 4.4 4.8 4.1    106 109   CO2 29.0 31.0 29.0     Recent Labs   Lab 24  1551 24  0640   RBC 3.75* 3.54*   HGB 12.2* 11.2*   HCT 35.0* 33.2*   MCV 93.3 93.8   MCH 32.5 31.6   MCHC 34.9 33.7   RDW 11.8 11.7   NEPRELIM 6.91 5.70   WBC 9.1 8.4   .0* 133.0*         Recent Labs   Lab 24  1551   TROPHS 17       Review of Systems   Constitutional: Negative.   Cardiovascular: Negative.    Respiratory: Negative.         Physical Exam:    Gen: alert, oriented x 3, NAD  Heent: pupils equal, reactive. Mucous membranes moist.   Neck: no jvd  Cardiac: regular rate and rhythm, normal S1,S2, no murmur, gallop or rub   Lungs: CTA  Abd: soft, NT/ND +bs  Ext: no edema, LLE wound, dressing cdi  Skin: Warm, dry  Neuro: No focal  deficits        Medications:     aspirin  81 mg Oral Daily    metoprolol succinate ER  50 mg Oral Daily    rosuvastatin  40 mg Oral Nightly    insulin degludec  20 Units Subcutaneous Daily    heparin  5,000 Units Subcutaneous 2 times per day    insulin aspart  1-7 Units Subcutaneous TID CC    levoFLOXacin  750 mg Oral Q48H    doxycycline  100 mg Oral 2 times per day     Assessment:  Dizziness appears multifactorial dehydration, orthostatic hypotension likely related to medications -now resolved      EKG, NSR, no acute ST changes   Orthostatic vitals positive   S/P IVF  On toprol, losartan, torsemide PTA -now held    JACLYN, crt improving post IVF  Multivessel CAD with plans for CABG once foot infection resolves   S/P LHC  11/14/24 showing severe triple-vessel disease with mild-mod LV dysfunction   On asa, statin, bb   HFmrEF, 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  Renal function much improved this morning, crt 1.22, holding losartan, aldactone, torsemide-will likely resume as op pending renal function and SBP stability.   Continue toprol, asa, statin.  Continue abx per primary.  Plan to dc home today, ok with cardiology, will schedule op follow up with Dr Barone next week, will check BMP prior office visit.   Patient awaiting CABG surgery once LLE infection resolves.     Plan of care discussed with patient, RN.    Kaia Rendon, VELASQUEZ  12/3/2024  9:02 AM  [phone]

## 2024-12-03 NOTE — DISCHARGE SUMMARY
[facility]  part of [facility]    Discharge Summary    Davin Hassan Patient Status:  Inpatient    1955 MRN [id number]   Location [facility] 3W/SW Attending No att. providers found   Hosp Day # 2 PCP Susana Magallon MD     Date of Admission: 2024     Date of Discharge: 24     Hospital Discharge Diagnoses:  Orthostatic hypotension    Lace+ Score: 57  59-90 High Risk  29-58 Medium Risk  0-28   Low Risk.    TCM Follow-Up Recommendation:  LACE 29-58: Moderate Risk of readmission after discharge from the hospital.    HPI Per Admitting Physician: The patient is a 68-year-old  male who came into the emergency department for a syncopal episode that lasted briefly earlier today after finishing lunch with his family. Chemistry showed GFR of 38, which is below his baseline, BUN and creatinine of 44 and 1.91. CBC showed hemoglobin of 12.2, which is at baseline. Liver function tests and troponin were negative. EKG showed normal sinus rhythm. MRI scan of the brain showed no acute findings. Chest x-ray showed no acute findings. The patient was started on IV fluids, and he will be admitted to the hospital for further management.     Hospital Course: The patient is a 68-year-old  male with hx of CAD awaiting CABG, left fifth metatarsal osteomyelitis, DM2 on insulin, peripheral diabetic neuropathy, and peripheral arterial disease, who presents with a syncopal episode that lasted briefly earlier today after finishing lunch.    # Dizziness  # Orthostatic hypotension  - BP lying 101/52 and standing 55/38 on arrival  - MRI brain showed no acute intracranial abnormality, but a chronic right cerebellar lacunar infarct.   - resolved with IVF  - Cards on consult    # CAD awaiting CABG  # Hypertension  # Hyperlipidemia  # Peripheral arterial disease  - continue home meds except losartan and torsemide  - CABG is scheduled in 2025  - Cards on consult    #  Acute  kidney injury   - baseline Cr around 1, up to 1.9 on arrival  - resolved with IVF    # DM2 on insulin  # Peripheral diabetic neuropathy  - A1c 8.9 last month  - continue home regimen at discharge    # Left fifth metatarsal osteomyelitis,  - s/p I&D procedure and bone biopsy  - currently on oral doxycycline and Levaquin until 12/19/24.     Dispo: home      Physical Examination:  Vital Signs:  Blood pressure 108/64, pulse 67, temperature 98.2 °F (36.8 °C), temperature source Oral, resp. rate 18, height 6' 1\" (1.854 m), weight 199 lb (90.3 kg), SpO2 96%.     GENERAL:  The patient appeared to be in no distress.    SKIN:  Warm and hydrated  HEENT:  Head was atraumatic and normocephalic.    NECK:  Supple.  There was no JVD.   HEART:  S1 and S2 heard.  RRR   LUNGS:  Air entry was good.  No crackles or wheezes   ABDOMEN: Soft and non-tender.  Bowel sounds were present.  MUSCULOSKELETAL:  There was no deformity.    EXTREMITIES: There was no edema, clubbing or cyanosis  NEUROLOGICAL:  There was no focal deficit.  Cranial nerves II through XII were intact.  PSYCHIATRIC: Calm and cooperative     CULTURE:   No results found for this visit on 12/01/24.    IMAGING STUDIES:   MRI BRAIN WO ACUTE (3) SEQUENCE (CPT=70551)    Result Date: 12/1/2024  CONCLUSION:  1. Limited ER protocol 3 sequence imaging of the brain was performed.  No acute intracranial abnormality. Specifically, no evidence of acute or early subacute infarction. 2. Chronic right cerebellar lacunar infarct.   Dictated by (CST): Shay Lechuga MD on 12/01/2024 at 5:04 PM     Finalized by (CST): Shay Lechuga MD on 12/01/2024 at 5:05 PM          XR CHEST AP PORTABLE  (CPT=71045)    Result Date: 12/1/2024  CONCLUSION:   Negative for radiographically evident acute intrathoracic process.   Dictated by (CST): Shay Lechuga MD on 12/01/2024 at 4:44 PM     Finalized by (CST): Shay Lechuga MD on 12/01/2024 at 4:45 PM          XR FOOT, COMPLETE (MIN 3 VIEWS), LEFT  (GZU=04526)    Result Date: 11/19/2024  CONCLUSION:  1. Changes related to biopsy of 5th metatarsal , proximal and middle phalanx of small toe.     Dictated by (CST): Jose Eduardo Ren MD on 11/19/2024 at 9:32 PM     Finalized by (CST): Jose Eduardo Ren MD on 11/19/2024 at 9:37 PM          US VENOUS DOPPLER LEG LEFT - DIAG IMG (CPT=93971)    Result Date: 11/19/2024  CONCLUSION: Normal examination.     Dictated by (CST): Anthony Landon MD on 11/19/2024 at 10:30 AM     Finalized by (CST): Anthony Landon MD on 11/19/2024 at 10:30 AM           LABS :     Lab Results   Component Value Date    WBC 8.4 12/02/2024    HGB 11.2 (L) 12/02/2024    HCT 33.2 (L) 12/02/2024    .0 (L) 12/02/2024    CREATSERUM 1.22 12/03/2024    BUN 33 (H) 12/03/2024     12/03/2024    K 4.1 12/03/2024     12/03/2024    CO2 29.0 12/03/2024     (H) 12/03/2024    CA 9.7 12/03/2024    ALB 4.1 12/01/2024    ALKPHO 81 12/01/2024    BILT 0.4 12/01/2024    TP 6.8 12/01/2024    AST 21 12/01/2024    ALT 15 12/01/2024    T4F 1.0 07/09/2024    TSH 2.619 07/09/2024    PSA 1.8 12/26/2018       Recent Labs   Lab 12/01/24  1551 12/02/24  0640   RBC 3.75* 3.54*   HGB 12.2* 11.2*   HCT 35.0* 33.2*   MCV 93.3 93.8   MCH 32.5 31.6   MCHC 34.9 33.7   RDW 11.8 11.7   NEPRELIM 6.91 5.70   WBC 9.1 8.4   .0* 133.0*     Recent Labs   Lab 12/01/24  1551 12/02/24  0640 12/02/24  1454 12/03/24  0635   * 267* 170* 142*   BUN 44* 41* 38* 33*   CREATSERUM 1.91* 1.56* 1.65* 1.22   CA 9.5 9.4 10.1 9.7   ALB 4.1  --   --   --     143 142 143   K 4.3 4.4 4.8 4.1    108 106 109   CO2 29.0 29.0 31.0 29.0   ALKPHO 81  --   --   --    AST 21  --   --   --    ALT 15  --   --   --    BILT 0.4  --   --   --    TP 6.8  --   --   --      No results found for: \"PT\", \"INR\"    Discharge Medications:      Discharge Medications        CONTINUE taking these medications        Instructions Prescription details   Accu-Chek Soft Touch Lancets Misc        Refills: 0     aspirin 81 MG Tbec      Take 1 tablet (81 mg total) by mouth daily.   Quantity: 30 tablet  Refills: 5     Dexcom G7 Sensor Misc      1 each Every 10 days.   Quantity: 9 each  Refills: 3     doxycycline 100 MG Caps  Commonly known as: Vibramycin      Take 1 capsule (100 mg total) by mouth 2 (two) times daily for 28 days.   Stop taking on: December 19, 2024  Quantity: 56 capsule  Refills: 0     ergocalciferol 1.25 MG (05743 UT) Caps  Commonly known as: Vitamin D2      Take 1 capsule (50,000 Units total) by mouth once a week.   Refills: 0     Glucose Blood Strp       Refills: 0     insulin degludec 100 units/mL Sopn  Commonly known as: Tresiba      Inject 28 Units into the skin nightly.   Refills: 0     Insulin Pen Needle 31G X 6 MM Misc       Refills: 0     latanoprost 0.005 % Soln  Commonly known as: Xalatan      INSTILL 1 DROP INTO BOTH   EYES EVERY NIGHT   Quantity: 7.5 mL  Refills: 3     levoFLOXacin 750 MG Tabs  Commonly known as: Levaquin      Take 1 tablet (750 mg total) by mouth daily for 28 days.   Stop taking on: December 19, 2024  Quantity: 28 tablet  Refills: 0     metoprolol succinate ER 50 MG Tb24  Commonly known as: Toprol XL      Take 1 tablet (50 mg total) by mouth daily.   Quantity: 30 tablet  Refills: 3     NovoLOG FlexPen 100 UNIT/ML Sopn  Generic drug: insulin aspart      Inject 8 Units into the skin 3 (three) times daily before meals. Following a sliding scale at home.   Refills: 0     pioglitazone 15 MG Tabs  Commonly known as: Actos      Take 1 tablet (15 mg total) by mouth daily.   Refills: 0     rosuvastatin 40 MG Tabs  Commonly known as: Crestor      Take 1 tablet (40 mg total) by mouth nightly.   Quantity: 30 tablet  Refills: 3            STOP taking these medications      losartan 25 MG Tabs  Commonly known as: Cozaar        torsemide 20 MG Tabs  Commonly known as: Demadex                 Follow up Visits  Dr Tyesha Seals  Endocrinology  [facility]  [address] [place] [address]  [place] [address]  [phone]  Schedule an appointment as soon as possible for a visit  Diabetes / Hospital follow up    Susana Magallon MD  [address] , [address]  [place] [address]  [phone]    Schedule an appointment as soon as possible for a visit in 1 week(s)      Jorge Barone MD  [address] [place] [address]  [phone]    Follow up in 1 week(s)  Office will call you for follow up appt      Consultants         Provider   Role Specialty     Claudio Hodges MD      Consulting Physician Interventional, Cardiology            ----------------------------------------------------  35 MIN SPENT ON THIS DISCHARGE   12/3/2024  10:32 AM    Melvin Rush MD, PhD  [facility] Hospitalist

## 2024-12-04 ENCOUNTER — PATIENT OUTREACH (OUTPATIENT)
Dept: CASE MANAGEMENT | Age: 69
End: 2024-12-04

## 2024-12-04 DIAGNOSIS — Z02.9 ENCOUNTERS FOR UNSPECIFIED ADMINISTRATIVE PURPOSE: Primary | ICD-10-CM

## 2024-12-04 PROCEDURE — 1111F DSCHRG MED/CURRENT MED MERGE: CPT

## 2024-12-04 NOTE — PAYOR COMM NOTE
--------------  ADMISSION REVIEW     Payor: [employer] PPO  Subscriber #:  [id number]  Authorization Number: [id number]    Admit date: 12/1/24  Admit time:  6:30 PM       History   HPI  Patient is a 68-year-old gentleman with a history of independent diabetes hyperlipidemia peripheral diabetic neuropathy glaucoma peripheral artery disease recent abnormal stress test that led to angiogram that showed triple-vessel disease.  He was awaiting bypass surgery after an infection and possible osteomyelitis of his left foot was treated.  He is on p.o. antibiotics and states is getting better.  He presents today because he feels dizzy.  He states when he stands up he feels quite dizzy.  Describes it as spinning sensation.  No headache no visual complaints no focal weakness numbness or tingling he states when he sits back down he feels fine.  He attributes this to his new blood pressure medicines.    ED Triage Vitals [12/01/24 1534]   /67   Pulse 67   Resp 20   Temp 98.7 °F (37.1 °C)   Temp src Oral   SpO2 98 %   O2 Device      Current Vitals:   Vital Signs  BP: (!) 55/38  Pulse: 71  Resp: 20  Temp: 98.7 °F (37.1 °C)  Temp src: Oral    Oxygen Therapy  SpO2: 98 %      Labs Reviewed   CBC WITH DIFFERENTIAL WITH PLATELET - Abnormal; Notable for the following components:       Result Value    RBC 3.75 (*)     HGB 12.2 (*)     HCT 35.0 (*)     .0 (*)     All other components within normal limits   BASIC METABOLIC PANEL (8) - Abnormal; Notable for the following components:    Glucose 294 (*)     BUN 44 (*)     Creatinine 1.91 (*)     BUN/CREA Ratio 23.0 (*)     Calculated Osmolality 308 (*)     eGFR-Cr 38 (*)     All other components within normal limits   POCT GLUCOSE - Abnormal; Notable for the following components:    POC Glucose  300 (*)    EKG    Rate, intervals and axes as noted on EKG Report.  Rate: 66  Rhythm: Sinus Rhythm  Reading: Normal sinus rhythm Q waves inferiorly and anteriorly    MRI BRAIN WO  ACUTE (3) SEQUENCE (CPT=70551)  Result Date: 12/1/2024  CONCLUSION:  1. Limited ER protocol 3 sequence imaging of the brain was performed.  No acute intracranial abnormality. Specifically, no evidence of acute or early subacute infarction. 2. Chronic right cerebellar lacunar infarct.   Dictated by (CST): Shay Lechuga MD on 12/01/2024 at 5:04 PM     Finalized by (CST): Shay Lechuga MD on 12/01/2024 at 5:05 PM          XR CHEST AP PORTABLE  (CPT=71045)  Result Date: 12/1/2024  CONCLUSION:   Negative for radiographically evident acute intrathoracic process.   Dictated by (CST): Shay Lechuga MD on 12/01/2024 at 4:44 PM     Finalized by (CST): Shay Lechuga MD on 12/01/2024 at 4:45 PM          Admission disposition: 12/1/2024  5:25 PM    Disposition and Plan     Clinical Impression:  1. Dizziness    2. Orthostatic hypotension    3. Dehydration       Disposition:  Admit  12/1/2024  5:25 pm     HISTORY AND PHYSICAL EXAMINATION     CHIEF COMPLAINT:  Acute kidney injury, hypovolemia, and syncope.     HISTORY OF PRESENT ILLNESS:  The patient is a 68-year-old  male who came into the emergency department for a syncopal episode that lasted briefly earlier today after finishing lunch with his family.  Chemistry showed GFR of 38, which is below his baseline, BUN and creatinine of 44 and 1.91.  CBC showed hemoglobin of 12.2, which is at baseline.  Liver function tests and troponin were negative.  EKG showed normal sinus rhythm.  MRI scan of the brain showed no acute findings.  Chest x-ray showed no acute findings.  The patient was started on IV fluids, and he will be admitted to the hospital for further management.     PAST MEDICAL HISTORY:  Recent cardiac angiogram which showed multivessel coronary artery disease requiring evaluation by Cardiovascular Surgery for coronary artery bypass graft surgery.  Bypass procedure has been delayed because of diagnosis of left fifth metatarsal osteomyelitis, required I and D  procedure and bone biopsy.  He was started on IV antibiotics and currently on oral doxycycline and Levaquin until December 19.  He had insulin-dependent diabetes mellitus type 2, hyperlipidemia, peripheral diabetic neuropathy, glaucoma, and peripheral arterial disease.     PAST SURGICAL HISTORY:  As mentioned above, left foot I and D procedure and bone biopsy.   SOCIAL HISTORY:  No tobacco or drug use.  Occasional alcohol.  Lives with his family.  Independent in his basic activities of daily living.     REVIEW OF SYSTEMS:  The patient reports that he has been feeling lightheaded and dizzy, especially when he stands up recently.  He has been taking his medications, including torsemide, metoprolol, losartan.  Today, after finishing lunch with his family, he stood up, felt lightheaded and dizzy, and his son caught him and he did not fall to the ground.  Brought into the emergency department for evaluation.  Upon arrival noted to have systolic blood pressure of 119.  Orthostatic measurement blood pressure dropped to 55 systolic.  Other 12-point review of systems is negative.     PHYSICAL EXAMINATION:  GENERAL:  Alert and oriented to time, place, and person.  No acute distress, appears fatigued.  VITAL SIGNS:  Temperature 98.7, pulse 71, respiratory rate 20, blood pressure 55/38 upon standing up, Oxygen saturation 98%  HEENT:  Atraumatic.  Oropharynx clear.  Dry mucous membranes.  Ears, nose normal.  Eyes:  Anicteric sclerae.  NECK:  Supple.  No lymphadenopathy.  Trachea midline.  Full range of motion.  LUNGS:  Clear to auscultation bilaterally.  Normal respiratory effort.  HEART:  Regular rate and rhythm.  S1 and S2 auscultated.  No murmur.  ABDOMEN:  Soft, nondistended.  No tenderness.  Positive bowel sounds.  EXTREMITIES:  No peripheral edema, clubbing, or cyanosis.  Left anterior foot dorsal fifth ray surgical area without complications or dehiscence.  No erythema or drainage.  NEUROLOGIC:  Motor and sensory  intact.     ASSESSMENT:  1.       Acute kidney injury.  2.       Orthostatic hypotension with syncope.  3.       Diabetes mellitus type 2.  4.       Coronary artery disease.     PLAN:  The patient will be admitted to general medical floor.  Monitor hemodynamic status.  Hold losartan and diuretics.  IV fluids.  Monitor Accu-Cheks.  Monitor his kidney function.  Fall precautions.  Cardiology consult.  Further recommendations to follow.             12/2/24             Vitals:     12/01/24 2055 12/02/24 0500 12/02/24 0554 12/02/24 0622   BP: 110/58   137/65     BP Location: Right arm   Right arm     Pulse:     65 (!) 48   Resp:     18     Temp:     98.1 °F (36.7 °C)     TempSrc:     Oral     SpO2:     97%     Weight:   204 lb 6.4 oz (92.7 kg)       Height:               SKIN:  Warm and hydrated  HEENT:  Head was atraumatic and normocephalic.    CHEST:  Symmetrical movement on inspiration  LUNGS:  No audible wheezing  ABDOMEN: Non-distended  MUSCULOSKELETAL:  There was no deformity.    EXTREMITIES: There was no edema  NEUROLOGICAL:  There was no focal deficit.    PSYCHIATRIC: Calm and cooperative      Current Scheduled Inpatient Meds:       heparin  5,000 Units Subcutaneous 2 times per day    insulin aspart  1-7 Units Subcutaneous TID CC    levoFLOXacin  750 mg Oral Q48H    doxycycline  100 mg Oral 2 times per day         Lab 12/01/24  1551 12/02/24  0640   RBC 3.75* 3.54*   HGB 12.2* 11.2*   HCT 35.0* 33.2*   MCV 93.3 93.8   MCH 32.5 31.6   MCHC 34.9 33.7   RDW 11.8 11.7   NEPRELIM 6.91 5.70   WBC 9.1 8.4   .0* 133.0*      Lab 12/01/24  1551 12/02/24  0640   * 267*   BUN 44* 41*   CREATSERUM 1.91* 1.56*   CA 9.5 9.4   ALB 4.1  --     143   K 4.3 4.4    108   CO2 29.0 29.0   ALKPHO 81  --    AST 21  --    ALT 15  --    BILT 0.4  --    TP 6.8  --       MRI BRAIN WO ACUTE (3) SEQUENCE (CPT=70551)   Result Date: 12/1/2024  CONCLUSION:         1. Limited ER protocol 3 sequence imaging of the brain  was performed.  No acute intracranial abnormality. Specifically, no evidence of acute or early subacute infarction. 2. Chronic right cerebellar lacunar infarct.   Dictated by (CST): Shay Lechuga MD on 12/01/2024 at 5:04 PM     Finalized by (CST): Shay Lcehuga MD on 12/01/2024 at 5:05 PM           XR CHEST AP PORTABLE  (CPT=71045)  Result Date: 12/1/2024  CONCLUSION:          Negative for radiographically evident acute intrathoracic process.   Dictated by (CST): Shay Lechuga MD on 12/01/2024 at 4:44 PM     Finalized by (CST): Shay Lechuga MD on 12/01/2024 at 4:45 PM           EKG 12 Lead  Result Date: 12/2/2024  Normal sinus rhythm Inferior infarct , age undetermined Possible Anterolateral infarct , age undetermined Abnormal ECG No previous ECGs found in [facility]   Assessment and Plan:   The patient is a 68-year-old  male with hx of CAD awaiting CABG, left fifth metatarsal osteomyelitis, DM2 on insulin, peripheral diabetic neuropathy, and peripheral arterial disease, who presents with a syncopal episode that lasted briefly earlier today after finishing lunch.     # Dizziness  # Orthostatic hypotension  - BP lying 101/52 and standing 55/38 on arrival  - MRI brain showed no acute intracranial abnormality, but a chronic right cerebellar lacunar infarct.   - IVF  - Cards on consult     # CAD awaiting CABG  # Hypertension  # Hyperlipidemia  # Peripheral arterial disease  - continue home meds  - Cards on consult     #  Acute kidney injury   - baseline Cr around 1, up to 1.9 on arrival  - on IVF, Cr down to 1.6 today     # DM2 on insulin  # Peripheral diabetic neuropathy  - A1c 8.9 last month  - Degludec 20u, SSI AC/HS     # Left fifth metatarsal osteomyelitis,  - s/p I&D procedure and bone biopsy  - currently on oral doxycycline and Levaquin until 12/19/24.      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  Renal function much improved this morning, crt 1.22, holding losartan, aldactone, torsemide-will likely resume as op pending renal function and SBP stability.   Continue toprol, asa, statin.  Continue abx per primary.  Plan to dc home today, ok with cardiology, will schedule op follow up with Dr Barone next week, will check BMP prior office visit.   Patient awaiting CABG surgery once LLE infection resolves.     DATE OF DISCHARGE: 12/3/24      Vitals (last day) before discharge       Date/Time Temp Pulse Resp BP SpO2 Weight O2 Device O2 Flow Rate (L/min) Who    12/03/24 0848 98.2 °F (36.8 °C) 67 18 108/64 96 % -- None (Room air) -- NN    12/03/24 0415 98.2 °F (36.8 °C) 55 18 127/73 97 % -- None (Room air) -- EC    12/02/24 2107 98.3 °F (36.8 °C) 59 18 128/67 96 % -- None (Room air) -- EC    12/02/24 0907 98.6 °F (37 °C) 75 17 120/61 97 % -- None (Room air) -- AT    12/02/24 0622 -- 48 -- -- -- -- -- -- KD    12/02/24 0554 98.1 °F (36.7 °C) 65 18 137/65 97 % -- None (Room air) -- EC    12/02/24 0500 -- -- -- -- -- 204 lb 6.4 oz (92.7 kg) -- -- ES

## 2024-12-04 NOTE — PROGRESS NOTES
Hospital Follow up for Diabetes (Discharge 12/3 elm)         Endo   Tyesha Seals: follow up one to three weeks   Endocrinology   [facility]   [address] [place] [address] [place] [address]   [phone]   PCP   Susana Magallon : follow up one week   [address], [address]   [facility] [id number] [phone]   Cardio   Jorge Barone: follow up one week    [address]   [facility] [address]   [facility] [id number] [phone]

## 2024-12-05 NOTE — PROGRESS NOTES
Hospital Follow up for Diabetes (Discharge 12/3 elm)         Endo   Tyesha Seals: follow up one to three weeks   Endocrinology   [facility]   [address] [place] [address]   [phone]   Pt declined to make appt stated let provider aware of inpatient stay   PCP   Susana Magallon : follow up one week   [address], [address]   [facility] [address] [phone]   Pt declined to make appt stated let provider aware of inpatient stay   Cardio   Jorge Barone: follow up one week    [address]   [address]   [facility] [address] [phone]   Existing appt 12/13     Confirmed with pt   Closing encounter

## 2024-12-06 ENCOUNTER — TELEPHONE (OUTPATIENT)
Dept: INTERNAL MEDICINE CLINIC | Facility: CLINIC | Age: 69
End: 2024-12-06

## 2024-12-06 NOTE — TELEPHONE ENCOUNTER
Spoke to patient for Transitions of Care call today.  Patient does not have an appointment scheduled at this time.  HFU appointment needed by 12/10/24.  Please advise.    BOOK BY DATE: 12/10/24    Clinical staff:  Please follow-up with patient and try to get them to schedule as patient would greatly benefit from HFU.  Thank you!

## 2024-12-06 NOTE — PROGRESS NOTES
Transitions of Care Navigation  Discharge Date: 12/3/24  Contact Date: 2024    Transitions of Care Assessment:  MAVIS Initial Assessment    General:  Assessment completed with: Patient  Patient Subjective: Spoke with patient he is feeling much better since hospital discharge. His blood pressures have returned to normal.  His dizziness is \"95%\" resolved. He has stopped taking losartan and torsemide and sees his cardiologist on . He has follow up with his podiatrist on Monday and his toe incision is almost healed.  He does still have sutures. He reports incision is not red,  swollen or draining. He is not having any toe pain. He will put on his continuous glucose monitor today. Medications reviewed.  Patient denies the following symptoms:  fever, headache, dizziness, chest pain, shortness of breath, abdominal pain, nausea or vomiting.  Patient was instructed to return to emergency room or call 911 if he begins experiencing low blood pressure again, worsening dizziness, chest pain, shortness of breath or fever. Patient did not have any additional questions or concerns.  Chief Complaint: Orthostatic hypotension  Verify patient name and  with patient/ caregiver: Yes    Hospital Stay/Discharge:  Tell me what you understand of why you were in the hospital or emergency department: My blood pressure was too low  Prior to leaving the hospital were your Discharge Instructions reviewed with you?: Yes  Did you receive a copy of your written Discharge Instructions?: Yes  What questions do you have about your Discharge Instructions?: Patient did not have any additional questions.  Do you feel better or worse since you left the hospital or emergency department?: Better    Follow - Up Appointment:  Do you have a follow-up appointment?: Yes  Date: 24  Physician: cardiology [facility]  Are there any barriers to getting to your follow-up appointment?: No    Home Health/DME:  Prior to leaving the hospital was Home Health (HH)  arranged for you?: N/A  Are HH needs identified by staff during the assessment?: No     Prior to leaving the hospital or emergency department was Durable Medical Equipment (DME), medical supplies, or infusions arranged for you?: N/A  Are DME/medical supply/infusions needs identified by staff during this assessment?: No     Medications/Diet:  Did any of your medications change, during or after your hospital stay or ED visit?: Yes  Do you have your new or updated medications?: Yes  Do you understand what your medications are for and possible side effects?: Yes  Are there any reasons that keep you from taking your medication as prescribed?: No  Any concerns about medication refills?: No    Were you given a different diet per your Discharge Instructions?: No  Reason: n/a     Questions/Concerns:  Do you have any questions or concerns that have not been discussed?: No       Nursing Interventions:  Assessed pain and for other signs/symptoms.  Reviewed medications. Instructed when to return to emergency room. Confirmed appointment with primary care physician and/or specialist.      Medications:  Reviewed medications with patient.   Patient did not have any additional questions.     Current Outpatient Medications   Medication Sig Dispense Refill    Continuous Glucose Sensor (DEXCOM G7 SENSOR) Does not apply Misc 1 each Every 10 days. 9 each 3    ergocalciferol 1.25 MG (79100 UT) Oral Cap Take 1 capsule (50,000 Units total) by mouth once a week.      pioglitazone 15 MG Oral Tab Take 1 tablet (15 mg total) by mouth daily.      doxycycline 100 MG Oral Cap Take 1 capsule (100 mg total) by mouth 2 (two) times daily for 28 days. 56 capsule 0    levoFLOXacin 750 MG Oral Tab Take 1 tablet (750 mg total) by mouth daily for 28 days. 28 tablet 0    rosuvastatin 40 MG Oral Tab Take 1 tablet (40 mg total) by mouth nightly. 30 tablet 3    metoprolol succinate ER 50 MG Oral Tablet 24 Hr Take 1 tablet (50 mg total) by mouth daily. 30 tablet  3    aspirin 81 MG Oral Tab EC Take 1 tablet (81 mg total) by mouth daily. 30 tablet 5    insulin degludec 100 units/mL Subcutaneous Solution Pen-injector Inject 28 Units into the skin nightly.      latanoprost 0.005 % Ophthalmic Solution INSTILL 1 DROP INTO BOTH   EYES EVERY NIGHT 7.5 mL 3    NOVOLOG FLEXPEN 100 UNIT/ML Subcutaneous Solution Pen-injector Inject 8 Units into the skin 3 (three) times daily before meals. Following a sliding scale at home.      Insulin Pen Needle 31G X 6 MM Does not apply Misc Inject 1 each into the skin 4 (four) times daily.         Diagnosis specifics:  (Please enter following SmartPhrase as applicable)  AMI: .TOCAMI  CHF: .TOCCHF  COPD: .TOCCOPD  CVA: .TOCCVA  CV surgery: .TOCCVSURGERY  Pneumonia: .TOCPNEUMONIA  Re-admit: .TOCREADMIT  Warfarin/Coumadin: .TOCWARFARIN    Follow-up Appointments:      Transitional Care Clinic  Was TCC Ordered: No    Primary Care Provider (If no TCC appointment)  Does patient already have a PCP appointment scheduled? No  Nurse Care Manager Attempted to schedule PCP office HFU appointment with patient   -If no appointment scheduled: Explain patient declined seeing cardiology.  Telephone encounter sent to primary care physician.     Specialist  Does the patient have any other follow-up appointment(s) need to be scheduled? Yes  Patient declined appointment with endocrinologist.   -If yes: Nurse Care Manager reviewed upcoming specialist appointments with patient: Yes   -Does the patient need assistance scheduling appointment(s): No    [x]  Patient verbally agrees to additional follow-up calls from Nurse Care Manager    Book By Date: 12/10/24

## 2024-12-09 NOTE — TELEPHONE ENCOUNTER
I called the patient to schedule a hospital follow up appointment.    He said he doesn't need to schedule an appointment.  Will call in the future if he needs to see Dr. Magallon.

## 2024-12-13 ENCOUNTER — LAB ENCOUNTER (OUTPATIENT)
Dept: LAB | Facility: HOSPITAL | Age: 69
End: 2024-12-13
Payer: MEDICARE

## 2024-12-13 DIAGNOSIS — N17.9 ACUTE KIDNEY INJURY (HCC): ICD-10-CM

## 2024-12-13 DIAGNOSIS — E66.09 EXOGENOUS OBESITY: ICD-10-CM

## 2024-12-13 DIAGNOSIS — E55.9 VITAMIN D DEFICIENCY DISEASE: ICD-10-CM

## 2024-12-13 DIAGNOSIS — E11.65 INADEQUATELY CONTROLLED DIABETES MELLITUS (HCC): ICD-10-CM

## 2024-12-13 DIAGNOSIS — E13.51: ICD-10-CM

## 2024-12-13 DIAGNOSIS — E78.2 MIXED HYPERLIPIDEMIA: ICD-10-CM

## 2024-12-13 DIAGNOSIS — I10 ESSENTIAL HYPERTENSION, MALIGNANT: ICD-10-CM

## 2024-12-13 LAB
ANION GAP SERPL CALC-SCNC: 3 MMOL/L (ref 0–18)
BUN BLD-MCNC: 30 MG/DL (ref 9–23)
BUN/CREAT SERPL: 23.4 (ref 10–20)
CALCIUM BLD-MCNC: 9.5 MG/DL (ref 8.7–10.4)
CHLORIDE SERPL-SCNC: 106 MMOL/L (ref 98–112)
CHOLEST SERPL-MCNC: 105 MG/DL (ref ?–200)
CO2 SERPL-SCNC: 31 MMOL/L (ref 21–32)
CREAT BLD-MCNC: 1.28 MG/DL
EGFRCR SERPLBLD CKD-EPI 2021: 61 ML/MIN/1.73M2 (ref 60–?)
FASTING PATIENT LIPID ANSWER: YES
GLUCOSE BLD-MCNC: 218 MG/DL (ref 70–99)
HDLC SERPL-MCNC: 33 MG/DL (ref 40–59)
LDLC SERPL CALC-MCNC: 57 MG/DL (ref ?–100)
NONHDLC SERPL-MCNC: 72 MG/DL (ref ?–130)
OSMOLALITY SERPL CALC.SUM OF ELEC: 303 MOSM/KG (ref 275–295)
POTASSIUM SERPL-SCNC: 4.4 MMOL/L (ref 3.5–5.1)
SODIUM SERPL-SCNC: 140 MMOL/L (ref 136–145)
TRIGL SERPL-MCNC: 69 MG/DL (ref 30–149)
VLDLC SERPL CALC-MCNC: 10 MG/DL (ref 0–30)

## 2024-12-13 PROCEDURE — 80061 LIPID PANEL: CPT

## 2024-12-13 PROCEDURE — 80048 BASIC METABOLIC PNL TOTAL CA: CPT

## 2024-12-13 PROCEDURE — 36415 COLL VENOUS BLD VENIPUNCTURE: CPT

## 2024-12-16 ENCOUNTER — PATIENT OUTREACH (OUTPATIENT)
Dept: CASE MANAGEMENT | Age: 69
End: 2024-12-16

## 2024-12-16 NOTE — PROGRESS NOTES
MAVIS Follow-up Assessment    General:  Assessment completed with: Patient  Community Resources: Other (n/a)    Progress/Care Plan:  Is the patient progressing as planned?: Yes  Care Plan Update: Spoke with patient he is feeling good.  He seen his cardiologist on 12/13 blood pressure was 128/74.  Cardiology did reach out to nurse practitioner @ CT surgery office to get patient scheduled to discuss open heart surgery.   Patient has not heard from that office yet to schedule.  Plan is to have procedure in the beginning of the year. Patient reports his blood pressure at home is normal.  His dizziness has resolved.  Patient seen is podiatrist on 12/9.  Some of the sutures of his 5th digit were removed.  He will return today to have the remaining removed.  Incision is free from redness, swelling or drainage. Patient has an appointment with infectious disease on  12/17.  Patient has not placed his continuous glucose monitor but does plan to place this AM.  Patient denies any pain in his 5th digit.   Patient denies the following symptoms:  fever, headache, dizziness, chest pain, shortness of breath, abdominal pain, nausea or vomiting.  Patient did not have any additional questions or concerns.  New Care Plan: Place continuous glucose monitor, see infection disease and podiatrist, schedule with CT surgery  Frequency/Follow Up Plan: 1 week     Notes:  Navigator Notes: podiatry, infectious disease, CT surgeon appointment, continuous glucose monitor, incision

## 2025-01-01 ENCOUNTER — TELEPHONE (OUTPATIENT)
Dept: INTERNAL MEDICINE CLINIC | Facility: CLINIC | Age: 70
End: 2025-01-01

## 2025-01-08 ENCOUNTER — PATIENT OUTREACH (OUTPATIENT)
Dept: CASE MANAGEMENT | Age: 70
End: 2025-01-08

## 2025-01-10 NOTE — PROGRESS NOTES
Dear Susana Magallon MD,    My name is Rufina Nix RN and I am this patient's Transition of Care Navigator.     Thank you for allowing me to participate in your patient's care over the past 30 days.     The goal of this program is to assist individuals in meeting their health care needs post discharge.  This is done by providing consistent care coordination and connecting patients with needed resources throughout this episode of care. I'd like to inform you that your patient Davin Hassan has completed the 30 day MAVIS navigation program as of today.     In discussion with the patient/family contact, I reviewed their providers and ongoing support contacts for the future.     Areas of need:  complete open heart surgery    Areas of progress: osteomyelitis resolved, suture removed, surgical incision free from infection, completed antibiotic course, completed follow up appointments, dizziness resolved, hypotension resolved.     It has been a pleasure communicating and working with you and thank you for allowing me to participate in the care of your patient.     If for any reason you have questions about the program, please feel free to contact me at the information below.      Sincerely,    Rufina Nix RN      MAVIS Graduation Assessment    General:  Assessment completed with: Patient  Community Resources: Other (n/a)    Care Plan/Instructions:   Care Plan Summary (Recap of navigation period including # of ED & Hospital Admission, and if goals met or unmet): Navigation period 12/6/24 - 1/10/25.  One admission on 12/1/24 that initiated MAVIS.  Goals have been met.  Patient Graduation Instructions (Ongoing barriers to care identified, Areas of Need, Areas of Progress): Barriers:  none  Needs:  complete open heart surgery  Progress:  osteomyelitis resolved, suture removed, surgical incision free from infection, completed antibiotic course, completed follow up appointments, dizziness resolved, hypotension  resolved.     Second attempt -  Left message on mailbox for patient to call nurse care manager back for transitions of care call.  Nurse care  information included in message.      Patient left detailed message on nurse care manager voicemail stating he is doing fine, his infection is cleared, his toe is ok and he will most likely be having open heart surgery in the beginning of February.

## 2025-02-18 NOTE — TELEPHONE ENCOUNTER
Geno from the [facility]'s office left a voicemail.  Patient was found  in patient's residence this morning by his wife.    Asking if Dr. Magallon can sign off on the death certificate for this patient.    [facility]'s office phone number is [phone].

## (undated) DEVICE — PAD,ABDOMINAL,8"X7.5",STERILE,LF,1/PK: Brand: MEDLINE

## (undated) DEVICE — SUT PROL 2-0 30IN CT-1 NABSRB BLU L36MM 1/2 C

## (undated) DEVICE — YANKAUER,FLEXIBLE HANDLE,REGLR CAPACITY: Brand: MEDLINE INDUSTRIES, INC.

## (undated) DEVICE — SUT ETHLN 3-0 30IN FS-1 NABSRB BLK 24MM 3/8 C

## (undated) DEVICE — C-ARM: Brand: UNBRANDED

## (undated) DEVICE — PAD,NON-ADHERENT,3X8,STERILE,LF,1/PK: Brand: MEDLINE

## (undated) DEVICE — PAD N ADH 3X4IN COT POLY SFT PERF FLM

## (undated) DEVICE — INTENDED FOR TISSUE SEPARATION, AND OTHER PROCEDURES THAT REQUIRE A SHARP SURGICAL BLADE TO PUNCTURE OR CUT.: Brand: BARD-PARKER ® STAINLESS STEEL BLADES

## (undated) DEVICE — SKIN PREP TRAY 4 COMPARTM TRAY: Brand: MEDLINE INDUSTRIES, INC.

## (undated) DEVICE — BANDAGE,COHESIVE,TAN,4X5YD,LF,STRL: Brand: MEDLINE

## (undated) DEVICE — CONTAINER,SPECIMEN,OR STERILE,4OZ: Brand: MEDLINE

## (undated) DEVICE — GAMMEX® NON-LATEX PI TEXTURED SIZE 6.5, STERILE POLYISOPRENE POWDER-FREE SURGICAL GLOVE: Brand: GAMMEX

## (undated) DEVICE — SOLUTION IRRIG 1000ML 0.9% NACL USP BTL

## (undated) DEVICE — 12 ML SYRINGE LUER-LOCK TIP: Brand: MONOJECT

## (undated) DEVICE — BANDAGE,GAUZE,CONFORMING,3"X75",STRL,LF: Brand: MEDLINE

## (undated) DEVICE — KIT SPEC COLL REG FLOCKED SWAB ESWAB

## (undated) DEVICE — GAMMEX® NON-LATEX SIZE 6.5, STERILE NEOPRENE POWDER-FREE SURGICAL GLOVE: Brand: GAMMEX

## (undated) DEVICE — PACK CDS LOWER EXTREMITY

## (undated) DEVICE — SPONGE 4X4 10PK

## (undated) NOTE — Clinical Note
Initial assessment completed with patient.  Patient does not have a follow up appointment   message sent to office to assist in scheduling.  Patient agrees to additional follow-up calls from nurse care manager.  Thank you!

## (undated) NOTE — Clinical Note
Initial assessment completed with patient.  Appointment scheduled for 11/25.  Patient agrees to additional follow-up calls from nurse care manager.  Thank you!

## (undated) NOTE — LETTER
[place] [address]  Authorization for Invasive Procedures  Date: 11/18/2024                       Time: 1700    I hereby authorize Katelynn Dow DPM, my physician and his/her assistants (if applicable), which may include medical students, residents, and/or fellows, to perform the following surgical operation/ procedure and administer such anesthesia as may be determined necessary by my physician: Left foot bone biopsy of the proximal phalanx and fifth metatarsal head on Davin Hassan  2.   I recognize that during the surgical operation/procedure, unforeseen conditions may necessitate additional or different procedures than those listed above.  I, therefore, further authorize and request that the above-named surgeon, assistants, or designees perform such procedures as are, in their judgment, necessary and desirable.    3.   My surgeon/physician has discussed prior to my surgery the potential benefits, risks and side effects of this procedure; the likelihood of achieving goals; and potential problems that might occur during recuperation.  They also discussed reasonable alternatives to the procedure, including risks, benefits, and side effects related to the alternatives and risks related to not receiving this procedure.  I have had all my questions answered and I acknowledge that no guarantee has been made as to the result that may be obtained.    4.   Should the need arise during my operation/procedure, which includes change of level of care prior to discharge, I also consent to the administration of blood and/or blood products.  Further, I understand that despite careful testing and screening of blood or blood products by collecting agencies, I may still be subject to ill effects as a result of receiving a blood transfusion and/or blood products.  The following are some, but not all, of the potential risks that can occur: fever and allergic reactions, hemolytic  reactions, transmission of diseases such as Hepatitis, AIDS and Cytomegalovirus (CMV) and fluid overload.  In the event that I wish to have an autologous transfusion of my own blood, or a directed donor transfusion, I will discuss this with my physician.   Check only if Refusing Blood or Blood Products  I understand refusal of blood or blood products as deemed necessary by my physician may have serious consequences to my condition to include possible death. I hereby assume responsibility for my refusal and release the hospital, its personnel, and my physicians from any responsibility for the consequences of my refusal.         o  Refuse         5.   I authorize the use of any specimen, organs, tissues, body parts or foreign objects that may be removed from my body during the operation/procedure for diagnosis, research or teaching purposes and their subsequent disposal by hospital authorities.  I also authorize the release of specimen test results and/or written reports to my treating physician on the hospital medical staff or other referring or consulting physicians involved in my care, at the discretion of the Pathologist or my treating physician.    6.   I consent to the photographing or videotaping of the operations or procedures to be performed, including appropriate portions of my body for medical, scientific, or educational purposes, provided my identity is not revealed by the pictures or by descriptive texts accompanying them.  If the procedure has been photographed/videotaped, the surgeon will obtain the original picture, image, videotape or CD.  The hospital will not be responsible for storage, release or maintenance of the picture, image, tape or CD.    7.   I consent to the presence of a  or observers in the operating room as deemed necessary by my physician or their designees.    8.   I recognize that in the event my procedure results in extended X-Ray/fluoroscopy time, I may develop a  skin reaction.    9. If I have a Do Not Attempt Resuscitation (DNAR) order in place, that status will be suspended while in the operating room, procedural suite, and during the recovery period unless otherwise explicitly stated by me (or a person authorized to consent on my behalf). The surgeon or my attending physician will determine when the applicable recovery period ends for purposes of reinstating the DNAR order.  10. Patients having a sterilization procedure: I understand that if the procedure is successful the results will be permanent and it will therefore be impossible for me to inseminate, conceive, or bear children.  I also understand that the procedure is intended to result in sterility, although the result has not been guaranteed.   11. I acknowledge that my physician has explained sedation/analgesia administration to me including the risk and benefits I consent to the administration of sedation/analgesia as may be necessary or desirable in the judgment of my physician.    I CERTIFY THAT I HAVE READ AND FULLY UNDERSTAND THE ABOVE CONSENT TO OPERATION and/or OTHER PROCEDURE.        ____________________________________       _________________________________      ______________________________  Signature of Patient         Signature of Responsible Person        Printed Name of Responsible Person        ____________________________________      _________________________________      ______________________________       Signature of Witness          Relationship to Patient                       Date                                       Time  Patient Name: Davin Hassan  : 1955    Reviewed: 2024   Printed: 2024  Medical Record #: [id number] Page 1 of 2             STATEMENT OF PHYSICIAN My signature below affirms that prior to the time of the procedure; I have explained to the patient and/or his/her legal representative, the risks and benefits involved in the proposed  treatment and any reasonable alternative to the proposed treatment. I have also explained the risks and benefits involved in refusal of the proposed treatment and alternatives to the proposed treatment and have answered the patient's questions. If I have a significant financial interest in a co-management agreement or a significant financial interest in any product or implant, or other significant relationship used in this procedure/surgery, I have disclosed this and had a discussion with my patient.     _______________________________________________________________ _____________________________  (Signature of Physician)                                                                                         (Date)                                   (Time)  Patient Name: Davin Hassan  : 1955    Reviewed: 2024   Printed: 2024  Medical Record #: [id number] Page 2 of 2

## (undated) NOTE — LETTER
[facility]  Administration of Anesthesia  I, Davin Hassan agree to be cared for by a physician anesthesiologist alone and/or with a nurse anesthetist, who is specially trained to monitor me and give me medicine to put me to sleep or keep me comfortable during my procedure    I understand that my anesthesiologist and/or anesthetist is not an employee or agent of [facility] or [employer]. He or she works for [facility]    As the patient asking for anesthesia services, I agree to:  Allow the anesthesiologist (anesthesia doctor) to give me medicine and do additional procedures as necessary. Some examples are: Starting or using an “IV” to give me medicine, fluids or blood during my procedure, and having a breathing tube placed to help me breathe when I’m asleep (intubation). In the event that my heart stops working properly, I understand that my anesthesiologist will make every effort to sustain my life, unless otherwise directed by [facility] Do Not Resuscitate documents.  Tell my anesthesia doctor before my procedure:  If I am pregnant.  The last time that I ate or drank.  iii. All of the medicines I take (including prescriptions, herbal supplements, and pills I can buy without a prescription (including street drugs/illegal medications). Failure to inform my anesthesiologist about these medicines may increase my risk of anesthetic complications.  iv.If I am allergic to anything or have had a reaction to anesthesia before.  I understand how the anesthesia medicine will help me (benefits).  I understand that with any type of anesthesia medicine there are risks:  The most common risks are: nausea, vomiting, sore throat, muscle soreness, damage to my eyes, mouth, or teeth (from breathing tube placement).  Rare risks include: remembering what happened during my procedure, allergic reactions to medications, injury to my airway, heart, lungs, vision,  nerves, or muscles and in extremely rare instances death.  My doctor has explained to me other choices available to me for my care (alternatives).  Pregnant Patients (“epidural”):  I understand that the risks of having an epidural (medicine given into my back to help control pain during labor), include itching, low blood pressure, difficulty urinating, headache or slowing of the baby’s heart. Very rare risks include infection, bleeding, seizure, irregular heart rhythms and nerve injury.  Regional Anesthesia (“spinal”, “epidural”, & “nerve blocks”):  I understand that rare but potential complications include headache, bleeding, infection, seizure, irregular heart rhythms, and nerve injury.    _____________________________________________________________________________  Patient (or Representative) Signature/Relationship to Patient  Date   Time    _____________________________________________________________________________   Name (if used)    Language/Organization   Time    _____________________________________________________________________________  Nurse Anesthetist Signature     Date   Time  _____________________________________________________________________________  Anesthesiologist Signature     Date   Time  I have discussed the procedure and information above with the patient (or patient’s representative) and answered their questions. The patient or their representative has agreed to have anesthesia services.    _____________________________________________________________________________  Witness        Date   Time  I have verified that the signature is that of the patient or patient’s representative, and that it was signed before the procedure  Patient Name: Davin Hassan     : 1955                 Printed: 2024 at 4:50 PM    Medical Record #: [id number]                                            Page 1 of 1  ----------ANESTHESIA CONSENT----------

## (undated) NOTE — LETTER
[place] [address]  Authorization for Invasive Procedures  Date: ***           Time: ***    I hereby authorize Katelynn Dow DPM, my physician and his/her assistants (if applicable), which may include medical students, residents, and/or fellows, to perform the following surgical operation/ procedure and administer such anesthesia as may be determined necessary by my physician: Left foot bone biopsy of the proximal phalanx and fifth metatarsal head on Davin Hassan  2.   I recognize that during the surgical operation/procedure, unforeseen conditions may necessitate additional or different procedures than those listed above.  I, therefore, further authorize and request that the above-named surgeon, assistants, or designees perform such procedures as are, in their judgment, necessary and desirable.    3.   My surgeon/physician has discussed prior to my surgery the potential benefits, risks and side effects of this procedure; the likelihood of achieving goals; and potential problems that might occur during recuperation.  They also discussed reasonable alternatives to the procedure, including risks, benefits, and side effects related to the alternatives and risks related to not receiving this procedure.  I have had all my questions answered and I acknowledge that no guarantee has been made as to the result that may be obtained.    4.   Should the need arise during my operation/procedure, which includes change of level of care prior to discharge, I also consent to the administration of blood and/or blood products.  Further, I understand that despite careful testing and screening of blood or blood products by collecting agencies, I may still be subject to ill effects as a result of receiving a blood transfusion and/or blood products.  The following are some, but not all, of the potential risks that can occur: fever and allergic reactions, hemolytic reactions, transmission of  diseases such as Hepatitis, AIDS and Cytomegalovirus (CMV) and fluid overload.  In the event that I wish to have an autologous transfusion of my own blood, or a directed donor transfusion, I will discuss this with my physician.   Check only if Refusing Blood or Blood Products  I understand refusal of blood or blood products as deemed necessary by my physician may have serious consequences to my condition to include possible death. I hereby assume responsibility for my refusal and release the hospital, its personnel, and my physicians from any responsibility for the consequences of my refusal.         o  Refuse         5.   I authorize the use of any specimen, organs, tissues, body parts or foreign objects that may be removed from my body during the operation/procedure for diagnosis, research or teaching purposes and their subsequent disposal by hospital authorities.  I also authorize the release of specimen test results and/or written reports to my treating physician on the hospital medical staff or other referring or consulting physicians involved in my care, at the discretion of the Pathologist or my treating physician.    6.   I consent to the photographing or videotaping of the operations or procedures to be performed, including appropriate portions of my body for medical, scientific, or educational purposes, provided my identity is not revealed by the pictures or by descriptive texts accompanying them.  If the procedure has been photographed/videotaped, the surgeon will obtain the original picture, image, videotape or CD.  The hospital will not be responsible for storage, release or maintenance of the picture, image, tape or CD.    7.   I consent to the presence of a  or observers in the operating room as deemed necessary by my physician or their designees.    8.   I recognize that in the event my procedure results in extended X-Ray/fluoroscopy time, I may develop a skin reaction.    9. If I  have a Do Not Attempt Resuscitation (DNAR) order in place, that status will be suspended while in the operating room, procedural suite, and during the recovery period unless otherwise explicitly stated by me (or a person authorized to consent on my behalf). The surgeon or my attending physician will determine when the applicable recovery period ends for purposes of reinstating the DNAR order.  10. Patients having a sterilization procedure: I understand that if the procedure is successful the results will be permanent and it will therefore be impossible for me to inseminate, conceive, or bear children.  I also understand that the procedure is intended to result in sterility, although the result has not been guaranteed.   11. I acknowledge that my physician has explained sedation/analgesia administration to me including the risk and benefits I consent to the administration of sedation/analgesia as may be necessary or desirable in the judgment of my physician.    I CERTIFY THAT I HAVE READ AND FULLY UNDERSTAND THE ABOVE CONSENT TO OPERATION and/or OTHER PROCEDURE.        ____________________________________       _________________________________      ______________________________  Signature of Patient         Signature of Responsible Person        Printed Name of Responsible Person        ____________________________________      _________________________________      ______________________________       Signature of Witness          Relationship to Patient                       Date                                       Time  Patient Name: Davin Hassan  : 1955    Reviewed: 2024   Printed: 2024  Medical Record #: [id number] Page 1 of 2             STATEMENT OF PHYSICIAN My signature below affirms that prior to the time of the procedure; I have explained to the patient and/or his/her legal representative, the risks and benefits involved in the proposed treatment and any  reasonable alternative to the proposed treatment. I have also explained the risks and benefits involved in refusal of the proposed treatment and alternatives to the proposed treatment and have answered the patient's questions. If I have a significant financial interest in a co-management agreement or a significant financial interest in any product or implant, or other significant relationship used in this procedure/surgery, I have disclosed this and had a discussion with my patient.     _______________________________________________________________ _____________________________  (Signature of Physician)                                                                                         (Date)                                   (Time)  Patient Name: Davin Hassan  : 1955    Reviewed: 2024   Printed: 2024  Medical Record #: [id number] Page 2 of 2